# Patient Record
Sex: MALE | Race: WHITE | NOT HISPANIC OR LATINO | Employment: OTHER | URBAN - METROPOLITAN AREA
[De-identification: names, ages, dates, MRNs, and addresses within clinical notes are randomized per-mention and may not be internally consistent; named-entity substitution may affect disease eponyms.]

---

## 2017-11-10 ENCOUNTER — HOSPITAL ENCOUNTER (OUTPATIENT)
Dept: OTHER | Facility: HOSPITAL | Age: 67
Setting detail: SPECIMEN
Discharge: HOME | End: 2017-11-10
Attending: FAMILY MEDICINE | Admitting: FAMILY MEDICINE

## 2017-11-10 LAB
ALBUMIN SERPL-MCNC: 4.1 G/DL (ref 3.5–4.8)
ALBUMIN/GLOB SERPL: 1.3 {RATIO} (ref 1–1.7)
ALP SERPL-CCNC: 72 IU/L (ref 32–91)
ALT SERPL-CCNC: 29 IU/L (ref 17–63)
ANION GAP SERPL CALC-SCNC: 12.7 MMOL/L (ref 10–20)
AST SERPL-CCNC: 31 IU/L (ref 15–41)
BASOPHILS # BLD AUTO: 0.1 10*3/UL (ref 0–0.2)
BASOPHILS NFR BLD AUTO: 1 % (ref 0–2)
BILIRUB SERPL-MCNC: 1.1 MG/DL (ref 0.3–1.2)
BUN SERPL-MCNC: 22 MG/DL (ref 8–20)
BUN/CREAT SERPL: 22 (ref 6.2–20.3)
CALCIUM SERPL-MCNC: 9.5 MG/DL (ref 8.9–10.3)
CHLORIDE SERPL-SCNC: 105 MMOL/L (ref 101–111)
CHOLEST SERPL-MCNC: 188 MG/DL
CHOLEST/HDLC SERPL: 4.4 {RATIO}
CONV CO2: 25 MMOL/L (ref 22–32)
CONV LDL CHOLESTEROL DIRECT: 118 MG/DL (ref 0–100)
CONV TOTAL PROTEIN: 7.2 G/DL (ref 6.1–7.9)
CREAT UR-MCNC: 1 MG/DL (ref 0.7–1.2)
DIFFERENTIAL METHOD BLD: (no result)
EOSINOPHIL # BLD AUTO: 0.4 10*3/UL (ref 0–0.3)
EOSINOPHIL # BLD AUTO: 6 % (ref 0–3)
ERYTHROCYTE [DISTWIDTH] IN BLOOD BY AUTOMATED COUNT: 13.2 % (ref 11.5–14.5)
GLOBULIN UR ELPH-MCNC: 3.1 G/DL (ref 2.5–3.8)
GLUCOSE SERPL-MCNC: 92 MG/DL (ref 65–99)
HCT VFR BLD AUTO: 48.5 % (ref 40–54)
HDLC SERPL-MCNC: 42 MG/DL
HGB BLD-MCNC: 16.7 G/DL (ref 14–18)
LDLC/HDLC SERPL: 2.8 {RATIO}
LIPID INTERPRETATION: ABNORMAL
LYMPHOCYTES # BLD AUTO: 1.2 10*3/UL (ref 0.8–4.8)
LYMPHOCYTES NFR BLD AUTO: 19 % (ref 18–42)
MCH RBC QN AUTO: 30.6 PG (ref 26–32)
MCHC RBC AUTO-ENTMCNC: 34.5 G/DL (ref 32–36)
MCV RBC AUTO: 88.6 FL (ref 80–94)
MONOCYTES # BLD AUTO: 0.6 10*3/UL (ref 0.1–1.3)
MONOCYTES NFR BLD AUTO: 9 % (ref 2–11)
NEUTROPHILS # BLD AUTO: 4.2 10*3/UL (ref 2.3–8.6)
NEUTROPHILS NFR BLD AUTO: 65 % (ref 50–75)
NRBC BLD AUTO-RTO: 0 /100{WBCS}
NRBC/RBC NFR BLD MANUAL: 0 10*3/UL
PLATELET # BLD AUTO: 248 10*3/UL (ref 150–450)
PMV BLD AUTO: 7.2 FL (ref 7.4–10.4)
POTASSIUM SERPL-SCNC: 4.7 MMOL/L (ref 3.6–5.1)
RBC # BLD AUTO: 5.48 10*6/UL (ref 4.6–6)
SODIUM SERPL-SCNC: 138 MMOL/L (ref 136–144)
TRIGL SERPL-MCNC: 136 MG/DL
TSH SERPL-ACNC: 0.98 UIU/ML (ref 0.34–5.6)
VLDLC SERPL CALC-MCNC: 27.4 MG/DL
WBC # BLD AUTO: 6.6 10*3/UL (ref 4.5–11.5)

## 2017-12-21 ENCOUNTER — HOSPITAL ENCOUNTER (OUTPATIENT)
Dept: SLEEP MEDICINE | Facility: HOSPITAL | Age: 67
Discharge: HOME | End: 2017-12-21
Attending: PSYCHIATRY & NEUROLOGY | Admitting: PSYCHIATRY & NEUROLOGY

## 2017-12-26 ENCOUNTER — HOSPITAL ENCOUNTER (OUTPATIENT)
Dept: SLEEP MEDICINE | Facility: HOSPITAL | Age: 67
Discharge: HOME | End: 2017-12-26
Attending: PSYCHIATRY & NEUROLOGY | Admitting: PSYCHIATRY & NEUROLOGY

## 2018-04-04 ENCOUNTER — OFFICE (AMBULATORY)
Dept: URBAN - METROPOLITAN AREA PATHOLOGY 4 | Facility: PATHOLOGY | Age: 68
End: 2018-04-04
Payer: COMMERCIAL

## 2018-04-04 ENCOUNTER — HOSPITAL ENCOUNTER (OUTPATIENT)
Dept: OTHER | Facility: HOSPITAL | Age: 68
Setting detail: SPECIMEN
Discharge: HOME | End: 2018-04-04
Attending: INTERNAL MEDICINE | Admitting: INTERNAL MEDICINE

## 2018-04-04 ENCOUNTER — ON CAMPUS - OUTPATIENT (AMBULATORY)
Dept: URBAN - METROPOLITAN AREA HOSPITAL 2 | Facility: HOSPITAL | Age: 68
End: 2018-04-04
Payer: COMMERCIAL

## 2018-04-04 VITALS
RESPIRATION RATE: 17 BRPM | HEIGHT: 72 IN | OXYGEN SATURATION: 97 % | DIASTOLIC BLOOD PRESSURE: 96 MMHG | DIASTOLIC BLOOD PRESSURE: 80 MMHG | SYSTOLIC BLOOD PRESSURE: 137 MMHG | DIASTOLIC BLOOD PRESSURE: 84 MMHG | DIASTOLIC BLOOD PRESSURE: 90 MMHG | HEART RATE: 66 BPM | OXYGEN SATURATION: 94 % | SYSTOLIC BLOOD PRESSURE: 151 MMHG | SYSTOLIC BLOOD PRESSURE: 114 MMHG | RESPIRATION RATE: 16 BRPM | HEART RATE: 71 BPM | HEART RATE: 84 BPM | SYSTOLIC BLOOD PRESSURE: 124 MMHG | DIASTOLIC BLOOD PRESSURE: 61 MMHG | HEART RATE: 82 BPM | DIASTOLIC BLOOD PRESSURE: 93 MMHG | SYSTOLIC BLOOD PRESSURE: 115 MMHG | HEART RATE: 67 BPM | RESPIRATION RATE: 18 BRPM | SYSTOLIC BLOOD PRESSURE: 130 MMHG | OXYGEN SATURATION: 95 % | SYSTOLIC BLOOD PRESSURE: 121 MMHG | HEART RATE: 68 BPM | TEMPERATURE: 97.5 F | WEIGHT: 209 LBS | OXYGEN SATURATION: 96 % | SYSTOLIC BLOOD PRESSURE: 141 MMHG | DIASTOLIC BLOOD PRESSURE: 92 MMHG

## 2018-04-04 DIAGNOSIS — K62.1 RECTAL POLYP: ICD-10-CM

## 2018-04-04 DIAGNOSIS — Z86.010 PERSONAL HISTORY OF COLONIC POLYPS: ICD-10-CM

## 2018-04-04 DIAGNOSIS — K57.30 DIVERTICULOSIS OF LARGE INTESTINE WITHOUT PERFORATION OR ABS: ICD-10-CM

## 2018-04-04 LAB
GI HISTOLOGY: A. UNSPECIFIED: (no result)
GI HISTOLOGY: PDF REPORT: (no result)

## 2018-04-04 PROCEDURE — 45385 COLONOSCOPY W/LESION REMOVAL: CPT | Mod: PT

## 2018-04-04 PROCEDURE — 88305 TISSUE EXAM BY PATHOLOGIST: CPT | Mod: 26

## 2018-04-04 RX ADMIN — PROPOFOL: 10 INJECTION, EMULSION INTRAVENOUS at 08:55

## 2018-12-11 ENCOUNTER — OFFICE (AMBULATORY)
Dept: URBAN - METROPOLITAN AREA CLINIC 64 | Facility: CLINIC | Age: 68
End: 2018-12-11
Payer: COMMERCIAL

## 2018-12-11 VITALS
DIASTOLIC BLOOD PRESSURE: 102 MMHG | SYSTOLIC BLOOD PRESSURE: 143 MMHG | HEIGHT: 72 IN | WEIGHT: 220 LBS | HEART RATE: 80 BPM

## 2018-12-11 DIAGNOSIS — Z86.010 PERSONAL HISTORY OF COLONIC POLYPS: ICD-10-CM

## 2018-12-11 DIAGNOSIS — K57.32 DIVERTICULITIS OF LARGE INTESTINE WITHOUT PERFORATION OR ABS: ICD-10-CM

## 2018-12-11 PROCEDURE — 99213 OFFICE O/P EST LOW 20 MIN: CPT | Performed by: INTERNAL MEDICINE

## 2019-07-05 ENCOUNTER — OFFICE VISIT (OUTPATIENT)
Dept: FAMILY MEDICINE CLINIC | Facility: CLINIC | Age: 69
End: 2019-07-05

## 2019-07-05 VITALS
HEIGHT: 72 IN | WEIGHT: 214.2 LBS | SYSTOLIC BLOOD PRESSURE: 134 MMHG | BODY MASS INDEX: 29.01 KG/M2 | DIASTOLIC BLOOD PRESSURE: 92 MMHG | HEART RATE: 75 BPM | OXYGEN SATURATION: 95 %

## 2019-07-05 DIAGNOSIS — C61 PROSTATE CANCER (HCC): ICD-10-CM

## 2019-07-05 DIAGNOSIS — E55.9 VITAMIN D DEFICIENCY: ICD-10-CM

## 2019-07-05 DIAGNOSIS — E78.2 MIXED HYPERLIPIDEMIA: ICD-10-CM

## 2019-07-05 DIAGNOSIS — Z00.00 ENCOUNTER FOR GENERAL ADULT MEDICAL EXAMINATION WITHOUT ABNORMAL FINDINGS: Primary | ICD-10-CM

## 2019-07-05 DIAGNOSIS — M50.30 DDD (DEGENERATIVE DISC DISEASE), CERVICAL: ICD-10-CM

## 2019-07-05 DIAGNOSIS — I10 ESSENTIAL HYPERTENSION: ICD-10-CM

## 2019-07-05 DIAGNOSIS — L72.0 EPIDERMAL INCLUSION CYST: ICD-10-CM

## 2019-07-05 PROBLEM — J30.1 HAY FEVER: Status: ACTIVE | Noted: 2017-05-30

## 2019-07-05 PROBLEM — J20.8 ACUTE BACTERIAL BRONCHITIS: Status: ACTIVE | Noted: 2017-04-15

## 2019-07-05 PROBLEM — M19.90 OSTEOARTHRITIS: Status: ACTIVE | Noted: 2019-07-05

## 2019-07-05 PROBLEM — N52.9 ERECTILE DYSFUNCTION: Status: ACTIVE | Noted: 2019-07-05

## 2019-07-05 PROBLEM — B96.89 ACUTE BACTERIAL BRONCHITIS: Status: ACTIVE | Noted: 2017-04-15

## 2019-07-05 PROBLEM — K57.92 DIVERTICULITIS: Status: ACTIVE | Noted: 2018-09-06

## 2019-07-05 PROBLEM — E78.5 HYPERLIPIDEMIA: Status: ACTIVE | Noted: 2019-07-05

## 2019-07-05 PROBLEM — J01.00 SINUSITIS, ACUTE MAXILLARY: Status: ACTIVE | Noted: 2017-04-15

## 2019-07-05 PROBLEM — G47.33 OBSTRUCTIVE SLEEP APNEA SYNDROME: Status: ACTIVE | Noted: 2017-11-10

## 2019-07-05 PROCEDURE — 99214 OFFICE O/P EST MOD 30 MIN: CPT | Performed by: FAMILY MEDICINE

## 2019-07-05 PROCEDURE — 11402 EXC TR-EXT B9+MARG 1.1-2 CM: CPT | Performed by: FAMILY MEDICINE

## 2019-07-05 RX ORDER — CETIRIZINE HCL 1 MG/ML
SOLUTION, ORAL ORAL
COMMUNITY
Start: 2017-05-30 | End: 2020-04-29 | Stop reason: ALTCHOICE

## 2019-07-05 RX ORDER — ASCORBIC ACID 250 MG
TABLET ORAL
COMMUNITY
Start: 2016-06-28 | End: 2019-09-20

## 2019-07-05 RX ORDER — CHOLECALCIFEROL (VITAMIN D3) 125 MCG
CAPSULE ORAL
COMMUNITY
Start: 2015-07-17

## 2019-07-05 RX ORDER — ANTIOX #8/OM3/DHA/EPA/LUT/ZEAX 250-2.5 MG
CAPSULE ORAL
COMMUNITY
Start: 2016-06-28 | End: 2019-09-20

## 2019-07-05 NOTE — PROGRESS NOTES
"Subjective   Kingston Mancini is a 68 y.o. male.     Pt in for F/U HBP/HLD/borderline DM.  Doing well overall w no CV or Neuro Sx's.      /92   Pulse 75   Ht 182.9 cm (72.01\")   Wt 97.2 kg (214 lb 3.2 oz)   SpO2 95%   BMI 29.04 kg/m²      The following portions of the patient's history were reviewed and updated as appropriate: allergies, current medications, past family history, past medical history, past social history, past surgical history and problem list.    Review of Systems   Constitutional: Negative.    HENT: Negative.    Eyes: Negative.    Respiratory: Negative.    Cardiovascular: Negative.         Hx of HBP/HLD.   Gastrointestinal: Negative.         Hx of Diverticular Disease.   Endocrine: Negative.         HLD/Borderline DM.   Genitourinary: Negative.         Hx of Prostate Ca.  Sees Dr Infante.   Musculoskeletal: Positive for neck pain and neck stiffness.   Allergic/Immunologic: Positive for environmental allergies.   Neurological: Negative.    Hematological: Negative.    Psychiatric/Behavioral: Negative.        Objective   Physical Exam    Assessment/Plan   Kingston was seen today for mass.    Diagnoses and all orders for this visit:    Encounter for general adult medical examination without abnormal findings  -     Cancel: CBC & Differential  -     Cancel: Comprehensive Metabolic Panel  -     Cancel: Lipid Panel  -     Cancel: Hemoglobin A1c  -     Cancel: TSH  -     Cancel: Vitamin D 1,25 Dihydroxy  -     CBC & Differential; Future  -     Comprehensive Metabolic Panel; Future  -     Hemoglobin A1c; Future  -     Lipid Panel; Future  -     TSH; Future  -     Vitamin D 1,25 Dihydroxy; Future    Essential hypertension  -     Cancel: CBC & Differential  -     Cancel: Comprehensive Metabolic Panel  -     Cancel: Lipid Panel  -     Cancel: TSH  -     CBC & Differential; Future  -     Comprehensive Metabolic Panel; Future  -     Lipid Panel; Future  -     TSH; Future    Mixed hyperlipidemia  -     Cancel: " Comprehensive Metabolic Panel  -     Cancel: Lipid Panel  -     Comprehensive Metabolic Panel; Future  -     Lipid Panel; Future    DDD (degenerative disc disease), cervical    Prostate cancer (CMS/HCC)    Vitamin D deficiency  -     Cancel: Vitamin D 1,25 Dihydroxy  -     Vitamin D 1,25 Dihydroxy; Future

## 2019-07-05 NOTE — PATIENT INSTRUCTIONS
Future Labs as noted. F/U as indicated. meds reviewed w Adjust prn.  Healthy Heart Diet and Exercise. Cont  F/U w Dr. Infante. [facility] F/U prn or in 6 mo for HBP.    Procedure Note: 1.5 cm Inclusion Cyst L Elbow removed w 1 % xylocaine infiltration and Electrocautery and Currettage.  No Path indicated.  Wound Care Instructions given.

## 2019-08-15 ENCOUNTER — OFFICE VISIT (OUTPATIENT)
Dept: FAMILY MEDICINE CLINIC | Facility: CLINIC | Age: 69
End: 2019-08-15

## 2019-08-15 VITALS
HEART RATE: 79 BPM | OXYGEN SATURATION: 97 % | SYSTOLIC BLOOD PRESSURE: 121 MMHG | DIASTOLIC BLOOD PRESSURE: 90 MMHG | WEIGHT: 211.2 LBS | HEIGHT: 72 IN | BODY MASS INDEX: 28.61 KG/M2

## 2019-08-15 DIAGNOSIS — I10 ESSENTIAL HYPERTENSION: ICD-10-CM

## 2019-08-15 DIAGNOSIS — H02.60 XANTHOMA OF EYELID: ICD-10-CM

## 2019-08-15 DIAGNOSIS — Z48.89 ENCOUNTER FOR POST SURGICAL WOUND CHECK: ICD-10-CM

## 2019-08-15 DIAGNOSIS — E78.2 MIXED HYPERLIPIDEMIA: ICD-10-CM

## 2019-08-15 DIAGNOSIS — E55.9 VITAMIN D DEFICIENCY: ICD-10-CM

## 2019-08-15 DIAGNOSIS — Z00.00 ENCOUNTER FOR GENERAL ADULT MEDICAL EXAMINATION WITHOUT ABNORMAL FINDINGS: Primary | ICD-10-CM

## 2019-08-15 LAB
ALBUMIN SERPL-MCNC: 4.3 G/DL (ref 3.5–4.8)
ALBUMIN/GLOB SERPL: 1.5 G/DL (ref 1–1.7)
ALP SERPL-CCNC: 65 U/L (ref 32–91)
ALT SERPL W P-5'-P-CCNC: 25 U/L (ref 17–63)
ANION GAP SERPL CALCULATED.3IONS-SCNC: 15.4 MMOL/L (ref 5–15)
ARTICHOKE IGE QN: 120 MG/DL (ref 0–100)
AST SERPL-CCNC: 28 U/L (ref 15–41)
BILIRUB SERPL-MCNC: 1.2 MG/DL (ref 0.3–1.2)
BUN BLD-MCNC: 25 MG/DL (ref 8–20)
BUN/CREAT SERPL: 22.7 (ref 6.2–20.3)
CALCIUM SPEC-SCNC: 9.4 MG/DL (ref 8.9–10.3)
CHLORIDE SERPL-SCNC: 109 MMOL/L (ref 101–111)
CHOLEST SERPL-MCNC: 176 MG/DL
CO2 SERPL-SCNC: 19 MMOL/L (ref 22–32)
CREAT BLD-MCNC: 1.1 MG/DL (ref 0.7–1.2)
GFR SERPL CREATININE-BSD FRML MDRD: 67 ML/MIN/1.73
GLOBULIN UR ELPH-MCNC: 2.9 GM/DL (ref 2.5–3.8)
GLUCOSE BLD-MCNC: 92 MG/DL (ref 65–99)
HDLC SERPL QL: 4.51
HDLC SERPL-MCNC: 39 MG/DL
LDLC/HDLC SERPL: 2.64 {RATIO}
POTASSIUM BLD-SCNC: 4.4 MMOL/L (ref 3.6–5.1)
PROT SERPL-MCNC: 7.2 G/DL (ref 6.1–7.9)
SODIUM BLD-SCNC: 139 MMOL/L (ref 136–144)
TRIGL SERPL-MCNC: 170 MG/DL
TSH SERPL DL<=0.05 MIU/L-ACNC: 1.44 MIU/ML (ref 0.34–5.6)
VLDLC SERPL-MCNC: 34 MG/DL

## 2019-08-15 PROCEDURE — 80061 LIPID PANEL: CPT | Performed by: FAMILY MEDICINE

## 2019-08-15 PROCEDURE — 99213 OFFICE O/P EST LOW 20 MIN: CPT | Performed by: FAMILY MEDICINE

## 2019-08-15 PROCEDURE — 80053 COMPREHEN METABOLIC PANEL: CPT | Performed by: FAMILY MEDICINE

## 2019-08-15 PROCEDURE — 82652 VIT D 1 25-DIHYDROXY: CPT | Performed by: FAMILY MEDICINE

## 2019-08-15 PROCEDURE — 36415 COLL VENOUS BLD VENIPUNCTURE: CPT | Performed by: FAMILY MEDICINE

## 2019-08-15 PROCEDURE — 84443 ASSAY THYROID STIM HORMONE: CPT | Performed by: FAMILY MEDICINE

## 2019-08-15 RX ORDER — AZITHROMYCIN 250 MG/1
TABLET, FILM COATED ORAL
Qty: 6 TABLET | Refills: 1 | Status: CANCELLED | OUTPATIENT
Start: 2019-08-15

## 2019-08-15 NOTE — PROGRESS NOTES
"Subjective   Kingston Mancini is a 68 y.o. male.     Pt in for F/U lesion removal site L Elbow.  Also wants to have lesion on L eyelid checked. Appears to be growing.  F/U borderline HBP/HLD.  NO CV or Neuro Sx's.     /90   Pulse 79   Ht 182.9 cm (72.01\")   Wt 95.8 kg (211 lb 3.2 oz)   SpO2 97%   BMI 28.64 kg/m²     The following portions of the patient's history were reviewed and updated as appropriate: allergies, current medications, past family history, past medical history, past social history, past surgical history and problem list.    Review of Systems   Constitutional: Negative.    Respiratory: Negative.    Cardiovascular: Negative.         Borderline HBP.   Gastrointestinal: Negative.    Endocrine: Negative.    Genitourinary: Negative.    Skin:        Lesion L upper Lid.   Hematological: Negative.        Objective   Physical Exam   Constitutional: He is oriented to person, place, and time. He appears well-developed and well-nourished. No distress.   Eyes: Conjunctivae and EOM are normal. Pupils are equal, round, and reactive to light.   Neck: Normal range of motion. Neck supple. No thyromegaly present.   Cardiovascular: Normal rate, regular rhythm, normal heart sounds and intact distal pulses. Exam reveals no gallop.   No murmur heard.  Pulmonary/Chest: Effort normal and breath sounds normal. He has no wheezes. He has no rales.   Abdominal: Soft. Bowel sounds are normal. He exhibits no mass. There is no tenderness.   Musculoskeletal: He exhibits no edema.   Lymphadenopathy:     He has no cervical adenopathy.   Neurological: He is alert and oriented to person, place, and time. No cranial nerve deficit or sensory deficit. He exhibits normal muscle tone. Coordination normal.   Skin: Skin is warm and dry.   L upper Lid w Xanthoma.  L Elbow Site healed well.   Nursing note and vitals reviewed.      Assessment/Plan   Kingston was seen today for annual exam.    Diagnoses and all orders for this " visit:    Encounter for general adult medical examination without abnormal findings  -     Vitamin D 1,25 Dihydroxy  -     TSH  -     Lipid Panel  -     Cancel: Hemoglobin A1c  -     Comprehensive Metabolic Panel  -     Cancel: CBC & Differential  -     Cancel: CBC Auto Differential  -     Scan Slide; Future  -     Cancel: Scan Slide  -     CBC & Differential; Future  -     Hemoglobin A1c; Future    Encounter for post surgical wound check  -     CBC & Differential; Future    Xanthoma of eyelid  -     CBC & Differential; Future    Vitamin D deficiency  -     Vitamin D 1,25 Dihydroxy  -     CBC & Differential; Future    Essential hypertension  -     TSH  -     Lipid Panel  -     Comprehensive Metabolic Panel  -     Cancel: CBC & Differential  -     Cancel: CBC Auto Differential  -     Scan Slide; Future  -     Cancel: Scan Slide  -     CBC & Differential; Future    Mixed hyperlipidemia  -     Lipid Panel  -     Comprehensive Metabolic Panel  -     CBC & Differential; Future    Other orders  -     Cancel: azithromycin (ZITHROMAX) 250 MG tablet; Take 2 tablets the first day, then 1 tablet daily for 4 days.

## 2019-08-15 NOTE — PATIENT INSTRUCTIONS
Ck Fasting Labs as noted.  F/U as indicated. Aggressive Tx of Triglys if elevated. Opthamology referral as indicated. PCP F/U prn or in 6 mo.

## 2019-08-16 ENCOUNTER — TELEPHONE (OUTPATIENT)
Dept: FAMILY MEDICINE CLINIC | Facility: CLINIC | Age: 69
End: 2019-08-16

## 2019-08-16 RX ORDER — AZITHROMYCIN 250 MG/1
TABLET, FILM COATED ORAL
Qty: 6 TABLET | Refills: 1 | Status: SHIPPED | OUTPATIENT
Start: 2019-08-16 | End: 2019-09-20

## 2019-08-16 NOTE — TELEPHONE ENCOUNTER
PT WALKED IN SAID HE NEEDS TO REDO SOME PORTION OF BW THE PURPLE TOP PER VICK.  ALSO HE SAID YOU WERE GOING TO SEND IN ZPACK AND THAT IS NOT THERE YET EITHER. THANKS BM

## 2019-08-19 ENCOUNTER — CLINICAL SUPPORT (OUTPATIENT)
Dept: FAMILY MEDICINE CLINIC | Facility: CLINIC | Age: 69
End: 2019-08-19

## 2019-08-19 DIAGNOSIS — H02.60 XANTHOMA OF EYELID: ICD-10-CM

## 2019-08-19 DIAGNOSIS — E55.9 VITAMIN D DEFICIENCY: ICD-10-CM

## 2019-08-19 DIAGNOSIS — E78.2 MIXED HYPERLIPIDEMIA: ICD-10-CM

## 2019-08-19 DIAGNOSIS — I10 ESSENTIAL HYPERTENSION: ICD-10-CM

## 2019-08-19 DIAGNOSIS — R73.9 ELEVATED BLOOD SUGAR: Primary | ICD-10-CM

## 2019-08-19 DIAGNOSIS — Z00.00 ENCOUNTER FOR GENERAL ADULT MEDICAL EXAMINATION WITHOUT ABNORMAL FINDINGS: ICD-10-CM

## 2019-08-19 DIAGNOSIS — Z48.89 ENCOUNTER FOR POST SURGICAL WOUND CHECK: ICD-10-CM

## 2019-08-19 LAB
1,25(OH)2D3 SERPL-MCNC: 37.4 PG/ML (ref 19.9–79.3)
BASOPHILS # BLD AUTO: 0.1 10*3/MM3 (ref 0–0.2)
BASOPHILS NFR BLD AUTO: 1 % (ref 0–1.5)
DEPRECATED RDW RBC AUTO: 42 FL (ref 37–54)
EOSINOPHIL # BLD AUTO: 0.5 10*3/MM3 (ref 0–0.4)
EOSINOPHIL NFR BLD AUTO: 8.1 % (ref 0.3–6.2)
ERYTHROCYTE [DISTWIDTH] IN BLOOD BY AUTOMATED COUNT: 13 % (ref 12.3–15.4)
HCT VFR BLD AUTO: 50.6 % (ref 37.5–51)
HGB BLD-MCNC: 17.4 G/DL (ref 13–17.7)
LYMPHOCYTES # BLD AUTO: 1.4 10*3/MM3 (ref 0.7–3.1)
LYMPHOCYTES NFR BLD AUTO: 23.6 % (ref 19.6–45.3)
MCH RBC QN AUTO: 31.2 PG (ref 26.6–33)
MCHC RBC AUTO-ENTMCNC: 34.3 G/DL (ref 31.5–35.7)
MCV RBC AUTO: 90.9 FL (ref 79–97)
MONOCYTES # BLD AUTO: 0.6 10*3/MM3 (ref 0.1–0.9)
MONOCYTES NFR BLD AUTO: 9.7 % (ref 5–12)
NEUTROPHILS # BLD AUTO: 3.5 10*3/MM3 (ref 1.7–7)
NEUTROPHILS NFR BLD AUTO: 57.6 % (ref 42.7–76)
NRBC BLD AUTO-RTO: 0.7 /100 WBC (ref 0–0.2)
PLATELET # BLD AUTO: 247 10*3/MM3 (ref 140–450)
PMV BLD AUTO: 8 FL (ref 6–12)
RBC # BLD AUTO: 5.57 10*6/MM3 (ref 4.14–5.8)
WBC NRBC COR # BLD: 6.1 10*3/MM3 (ref 3.4–10.8)

## 2019-08-19 PROCEDURE — 36415 COLL VENOUS BLD VENIPUNCTURE: CPT | Performed by: FAMILY MEDICINE

## 2019-08-19 PROCEDURE — 85025 COMPLETE CBC W/AUTO DIFF WBC: CPT | Performed by: FAMILY MEDICINE

## 2019-08-19 PROCEDURE — 99213 OFFICE O/P EST LOW 20 MIN: CPT | Performed by: FAMILY MEDICINE

## 2019-08-19 PROCEDURE — 83036 HEMOGLOBIN GLYCOSYLATED A1C: CPT | Performed by: FAMILY MEDICINE

## 2019-08-19 PROCEDURE — 82306 VITAMIN D 25 HYDROXY: CPT | Performed by: FAMILY MEDICINE

## 2019-08-19 PROCEDURE — 36415 COLL VENOUS BLD VENIPUNCTURE: CPT

## 2019-08-20 LAB
25(OH)D3 SERPL-MCNC: 31.7 NG/ML (ref 30–100)
HBA1C MFR BLD: 4.8 % (ref 3.5–5.6)

## 2019-08-21 ENCOUNTER — OFFICE VISIT (OUTPATIENT)
Dept: FAMILY MEDICINE CLINIC | Facility: CLINIC | Age: 69
End: 2019-08-21

## 2019-08-21 VITALS
SYSTOLIC BLOOD PRESSURE: 123 MMHG | DIASTOLIC BLOOD PRESSURE: 82 MMHG | HEIGHT: 72 IN | OXYGEN SATURATION: 97 % | WEIGHT: 212.8 LBS | HEART RATE: 67 BPM | BODY MASS INDEX: 28.82 KG/M2

## 2019-08-21 DIAGNOSIS — D72.10 EOSINOPHILIA: Primary | ICD-10-CM

## 2019-08-21 PROCEDURE — 99211 OFF/OP EST MAY X REQ PHY/QHP: CPT | Performed by: FAMILY MEDICINE

## 2019-08-22 NOTE — PATIENT INSTRUCTIONS
Labs as noted. F/U as indicated. meds reviewed w no changes.  Healthy heart Diet and Exercise.  PCP F/U prn or in 6 mo.

## 2019-08-22 NOTE — PROGRESS NOTES
Subjective   Kingston Mancini is a 68 y.o. male.     Pt in for Stanley of Wound site L elbow and Ck lesion above L eye.  Also wants Labs done.    There were no vitals taken for this visit.     The following portions of the patient's history were reviewed and updated as appropriate: allergies, current medications, past family history, past medical history, past social history, past surgical history and problem list.    Review of Systems   Constitutional: Negative.    HENT:        L Lid Lesion.   Eyes: Negative.    Respiratory: Negative.    Cardiovascular:        HBP/HLD.   Gastrointestinal: Negative.    Endocrine: Negative.    Genitourinary: Negative.    Musculoskeletal: Negative.    Neurological: Negative.    Hematological: Negative.    Psychiatric/Behavioral: The patient is nervous/anxious.         Mild chronic anxiety.       Objective   Physical Exam   Constitutional: He is oriented to person, place, and time. He appears well-developed and well-nourished. No distress.   HENT:   Head: Normocephalic and atraumatic.   Eyes: Conjunctivae are normal. Pupils are equal, round, and reactive to light.   Xanthoma L upper Lid.   Cardiovascular: Normal rate, regular rhythm, normal heart sounds and intact distal pulses. Exam reveals no gallop.   No murmur heard.  Pulmonary/Chest: Effort normal and breath sounds normal. No respiratory distress. He has no wheezes. He has no rales.   Abdominal: Soft. Bowel sounds are normal. He exhibits no mass. There is no tenderness.   No Bruits.   Musculoskeletal: Normal range of motion. He exhibits no edema or tenderness.   Neurological: He is alert and oriented to person, place, and time. No cranial nerve deficit or sensory deficit. He exhibits normal muscle tone. Coordination normal.   Skin: Skin is warm and dry. No rash noted.   Nursing note and vitals reviewed.      Assessment/Plan   Diagnoses and all orders for this visit:    Elevated blood sugar  -     Hemoglobin A1c  -     Vitamin D 25  Hydroxy; Future  -     Vitamin D 25 Hydroxy    Encounter for general adult medical examination without abnormal findings  -     Cancel: Hemoglobin A1c  -     Cancel: CBC & Differential  -     CBC & Differential  -     CBC Auto Differential  -     Vitamin D 25 Hydroxy; Future  -     Vitamin D 25 Hydroxy    Encounter for post surgical wound check  -     Cancel: CBC & Differential  -     Vitamin D 25 Hydroxy; Future  -     Vitamin D 25 Hydroxy    Xanthoma of eyelid  -     Cancel: CBC & Differential  -     Vitamin D 25 Hydroxy; Future  -     Vitamin D 25 Hydroxy    Vitamin D deficiency  -     Cancel: CBC & Differential  -     Cancel: Vitamin D 1,25 Dihydroxy  -     Vitamin D 25 Hydroxy; Future  -     Vitamin D 25 Hydroxy    Essential hypertension  -     Cancel: CBC & Differential  -     Vitamin D 25 Hydroxy; Future  -     Vitamin D 25 Hydroxy    Mixed hyperlipidemia  -     Cancel: CBC & Differential  -     Vitamin D 25 Hydroxy; Future  -     Vitamin D 25 Hydroxy

## 2019-08-24 NOTE — PATIENT INSTRUCTIONS
Referral to [facility] for Eval and F/U of Eosinophilia noted on CBC.  F/U as indicated.

## 2019-08-24 NOTE — PROGRESS NOTES
Pt in for discussion of CBC Results.  Pt and Spouse concerned over Eosinophilia and elevated RBC.  Discussed referral to Hematology for further Eval.

## 2019-09-13 NOTE — PROGRESS NOTES
Hematology/Oncology Outpatient Consultation    Patient name: Kingston Mancini  : 1950  MRN: [id number]  Primary Care Physician: Papa Brooks Jr., MD  Referring Physician: Papa Brooks Jr.*  Reason For Consult:     Chief Complaint   Patient presents with   • Appointment     Eosinophila and erythrocytosis   ENRIQUE Regalado present during office visit.     History of Present Illness:  This patient is a 68-year-old  male with history of early stage prostate cancer diagnosed in  status post radical prostatectomy who on routine blood testing by his primary care provider Papa Brooks MD on 1990 labs was found to have WBC 6.1 with 58% neutrophils, 24% lymphocytes, 10% monocytes, 8% eosinophils and 0.7% nucleated red blood cells.  His hemoglobin was 17.4, hematocrit 50.6, and platelet count 247,000.  He had had a CMP performed on 8/15/2019 that had not revealed any significant abnormalities.  He was referred here for abnormal CBC and complained of more sneezing over a 2-year period.  He claims that he started wearing CPAP for a diagnosis of AURELIA in 2018 and he has been sneezing since then.  He is also feeling somewhat tired and short of air.  He denies any headaches chest pains extremity paresthesias.  He does complain of occasional blurring of vision.  He does not drink well water, travels frequently, denies skin rashes and takes herbs for joint aches and pains.  His father was diagnosed with amyloidosis and patient is concerned that he may have blood related disease.    Past Medical History:   Diagnosis Date   • Erectile dysfunction    • History of basal cell cancer     Abstraction from [facility] Past Medical Hx states Basal Cell on ear   • History of cardiac cath     Abstraction From [facility] Heart Cath   • History of degenerative disc disease     DDD   • Hyperlipidemia    • Hypertension    • AURELIA on CPAP 2017    AURELIA npsg Dec 2017 ahi 29 auto cpap 7-14 nasal  mask   • Osteoarthritis    • Prostate cancer (CMS/HCC)     Hussein score 6, followed by Dr. Infante, age 59       Past Surgical History:   Procedure Laterality Date   • HAND SURGERY      4/93, 10/93, 9/2007, 4/2013, 7/2013   • HERNIA REPAIR     • OTHER SURGICAL HISTORY      Abstraction from [facility] Duputryns Contracture   • PROSTATECTOMY           Current Outpatient Medications:   •  Black Pepper-Turmeric (TURMERIC COMPLEX/BLACK PEPPER PO), Take  by mouth., Disp: , Rfl:   •  Calcium-Vitamin D-Vitamin K (VIACTIV PO), Take  by mouth., Disp: , Rfl:   •  cetirizine (ZYRTEC ALLERGY) 10 MG tablet, ZYRTEC ALLERGY 10 MG TABS, Disp: , Rfl:   •  Cholecalciferol (VITAMIN D3) 2000 units tablet, VITAMIN D3 2000 UNIT TABS, Disp: , Rfl:   •  Probiotic Product (PROBIOTIC-10 PO), Take  by mouth., Disp: , Rfl:   •  Unable to find, 1 each 1 (One) Time. simran 7, Disp: , Rfl:     Allergies   Allergen Reactions   • Mushroom Unknown (See Comments)   • Penicillin G Anaphylaxis       Immunization History   Administered Date(s) Administered   • Flu Vaccine High Dose PF 65YR+ 11/10/2017   • Pneumococcal Conjugate 13-Valent (PCV13) 10/25/2016   • Td 10/25/2016   • Zoster, Unspecified 01/01/2011       Family History   Problem Relation Age of Onset   • Cancer Other         Breast Cancer   • Diabetes Other    • Heart disease Other    • Hypertension Other    • Diverticulosis Other    • Other Other         Amyloidosis   • Breast cancer Mother 70   • Other Father         Amyliodosis   • Asthma Sister    • Heart attack Maternal Grandmother    • Kidney cancer Maternal Grandfather    • Diabetes Paternal Grandfather        Cancer-related family history includes Breast cancer (age of onset: 70) in his mother; Cancer in his other; Kidney cancer in his maternal grandfather.    Social History     Tobacco Use   • Smoking status: Never Smoker   • Smokeless tobacco: Never Used   Substance Use Topics   • Alcohol use: Yes     Frequency: 4 or more times a week  "    Drinks per session: 1 or 2   • Drug use: No       ROS:    Review of Systems   Constitutional: Negative for chills and fever.   HENT: Negative for ear pain, mouth sores, nosebleeds and sore throat.    Eyes: Negative for photophobia and visual disturbance.   Respiratory: Negative for wheezing and stridor.    Cardiovascular: Negative for chest pain and palpitations.   Gastrointestinal: Negative for abdominal pain, diarrhea, nausea and vomiting.   Endocrine: Negative for cold intolerance and heat intolerance.   Genitourinary: Negative for dysuria and hematuria.   Musculoskeletal: Negative for joint swelling and neck stiffness.   Skin: Negative for color change and rash.   Neurological: Negative for seizures and syncope.        Reported some tingling in both hands in the evening.    Hematological: Negative for adenopathy.        No obvious bleeding   Psychiatric/Behavioral: Negative for agitation, confusion and hallucinations.       Objective:    Vitals:    09/20/19 0838   BP: 146/95   Pulse: 60   Resp: 18   Temp: 98 °F (36.7 °C)   SpO2: 95%   Weight: 96.2 kg (212 lb)   Height: 182.9 cm (72\")   PainSc: 0-No pain       ECOG  (0) Fully active, able to carry on all predisease performance without restriction    Physical Exam:    Physical Exam   Constitutional: He is oriented to person, place, and time. No distress.   HENT:   Head: Normocephalic and atraumatic.   Eye glasses present. Dental fillings. Low light reflex to the right and left ear.    Eyes: Conjunctivae and EOM are normal. Right eye exhibits no discharge. Left eye exhibits no discharge. No scleral icterus.   Neck: Normal range of motion. Neck supple. No thyromegaly present.   Cardiovascular: Normal rate, regular rhythm and normal heart sounds. Exam reveals no gallop and no friction rub.   Pulmonary/Chest: Effort normal. No stridor. No respiratory distress. He has no wheezes.   Abdominal: Soft. Bowel sounds are normal. He exhibits no mass. There is no " tenderness. There is no rebound and no guarding.   Musculoskeletal: Normal range of motion. He exhibits no tenderness.   Degenerative changes.    Lymphadenopathy:     He has no cervical adenopathy.   Neurological: He is alert and oriented to person, place, and time. He exhibits normal muscle tone.   Skin: Skin is warm. No rash noted. He is not diaphoretic. No erythema.   Psychiatric: He has a normal mood and affect. His behavior is normal.   Nursing note and vitals reviewed.      RECENT LABS  WBC   Date Value Ref Range Status   08/19/2019 6.10 3.40 - 10.80 10*3/mm3 Final     RBC   Date Value Ref Range Status   08/19/2019 5.57 4.14 - 5.80 10*6/mm3 Final     Hemoglobin   Date Value Ref Range Status   08/19/2019 17.4 13.0 - 17.7 g/dL Final     Hematocrit   Date Value Ref Range Status   08/19/2019 50.6 37.5 - 51.0 % Final     MCV   Date Value Ref Range Status   08/19/2019 90.9 79.0 - 97.0 fL Final     MCH   Date Value Ref Range Status   08/19/2019 31.2 26.6 - 33.0 pg Final     MCHC   Date Value Ref Range Status   08/19/2019 34.3 31.5 - 35.7 g/dL Final     RDW   Date Value Ref Range Status   08/19/2019 13.0 12.3 - 15.4 % Final     RDW-SD   Date Value Ref Range Status   08/19/2019 42.0 37.0 - 54.0 fl Final     MPV   Date Value Ref Range Status   08/19/2019 8.0 6.0 - 12.0 fL Final     Platelets   Date Value Ref Range Status   08/19/2019 247 140 - 450 10*3/mm3 Final     Neutrophil %   Date Value Ref Range Status   08/19/2019 57.6 42.7 - 76.0 % Final     Lymphocyte %   Date Value Ref Range Status   08/19/2019 23.6 19.6 - 45.3 % Final     Monocyte %   Date Value Ref Range Status   08/19/2019 9.7 5.0 - 12.0 % Final     Eosinophil %   Date Value Ref Range Status   08/19/2019 8.1 (H) 0.3 - 6.2 % Final     Basophil %   Date Value Ref Range Status   08/19/2019 1.0 0.0 - 1.5 % Final     Neutrophils, Absolute   Date Value Ref Range Status   08/19/2019 3.50 1.70 - 7.00 10*3/mm3 Final     Lymphocytes, Absolute   Date Value Ref Range  Status   08/19/2019 1.40 0.70 - 3.10 10*3/mm3 Final     Monocytes, Absolute   Date Value Ref Range Status   08/19/2019 0.60 0.10 - 0.90 10*3/mm3 Final     Eosinophils, Absolute   Date Value Ref Range Status   08/19/2019 0.50 (H) 0.00 - 0.40 10*3/mm3 Final     Basophils, Absolute   Date Value Ref Range Status   08/19/2019 0.10 0.00 - 0.20 10*3/mm3 Final     nRBC   Date Value Ref Range Status   08/19/2019 0.7 (H) 0.0 - 0.2 /100 WBC Final       Lab Results   Component Value Date    GLUCOSE 92 08/15/2019    BUN 25 (H) 08/15/2019    CREATININE 1.10 08/15/2019    EGFRIFNONA 67 08/15/2019    EGFRIFAFRI 78 10/26/2016    BCR 22.7 (H) 08/15/2019    K 4.4 08/15/2019    CO2 19.0 (L) 08/15/2019    CALCIUM 9.4 08/15/2019    ALBUMIN 4.30 08/15/2019    LABIL2 1.3 11/10/2017    AST 28 08/15/2019    ALT 25 08/15/2019         Assessment/Plan     Eosinophilia  - Ova & Parasite Examination - Stool, Per Rectum  - Angiotensin Converting Enzyme  - DEE DEE Comprehensive Panel  - XR Chest PA & Lateral  - RHEUMATOID FACTOR  - IgE  - CBC & Differential    Erythrocytosis  - XR Chest PA & Lateral  - Lactate Dehydrogenase  - Vitamin B12  - Erythropoietin  - GenPath Requisition ([place] Only)  - Cobalt  - Hemoglobin Electrophoresis  - CBC & Differential  In summary this is a patient with recently diagnosed erythrocytosis and eosinophilia.  I discussed the possible etiologies with him in detail and have I recommended proceeding with a work-up by checking LDH, vitamin B12, erythropoietin level, RBC enzymes, cobalt, hemoglobin electrophoresis and Feliciano 2 for erythrocytosis as well as eosinophilia work-up by checking a chest x-ray, stool parasites, serum IgE, ACE level, rheumatoid factor, and DEE DEE.  If these tests are not diagnostic and if the blood abnormalities persist, a bone marrow biopsy may be needed.          I have reviewed labs results, imaging, vitals, and medications with the patient today.  Will follow up in 1 month with CBC.    Patient verbalized  understanding and is in agreement of the above plan.      I have reviewed and validated the information above.   Rell Ring M.D., F.A.C.P.        Much of the above report is an electronic transcription/translation of the spoken language to printed text using Dragon Software. As such, the subtleties and finesse of the spoken language may permit erroneous, or at times, nonsensical words or phrases to be inadvertently transcribed; thus changes may be made at a later date to rectify these errors.

## 2019-09-20 ENCOUNTER — OFFICE VISIT (OUTPATIENT)
Dept: LAB | Facility: HOSPITAL | Age: 69
End: 2019-09-20

## 2019-09-20 ENCOUNTER — CONSULT (OUTPATIENT)
Dept: ONCOLOGY | Facility: CLINIC | Age: 69
End: 2019-09-20

## 2019-09-20 ENCOUNTER — TELEPHONE (OUTPATIENT)
Dept: ONCOLOGY | Facility: CLINIC | Age: 69
End: 2019-09-20

## 2019-09-20 ENCOUNTER — RESULTS ENCOUNTER (OUTPATIENT)
Dept: ONCOLOGY | Facility: CLINIC | Age: 69
End: 2019-09-20

## 2019-09-20 VITALS
OXYGEN SATURATION: 95 % | WEIGHT: 212 LBS | HEIGHT: 72 IN | DIASTOLIC BLOOD PRESSURE: 95 MMHG | SYSTOLIC BLOOD PRESSURE: 146 MMHG | TEMPERATURE: 98 F | HEART RATE: 60 BPM | RESPIRATION RATE: 18 BRPM | BODY MASS INDEX: 28.71 KG/M2

## 2019-09-20 DIAGNOSIS — D72.10 EOSINOPHILIA: Primary | ICD-10-CM

## 2019-09-20 DIAGNOSIS — D75.1 ERYTHROCYTOSIS: ICD-10-CM

## 2019-09-20 DIAGNOSIS — D72.10 EOSINOPHILIA: ICD-10-CM

## 2019-09-20 LAB
BASOPHILS # BLD AUTO: 0.04 10*3/MM3 (ref 0–0.2)
BASOPHILS NFR BLD AUTO: 0.7 % (ref 0–1.5)
DEPRECATED RDW RBC AUTO: 41.8 FL (ref 37–54)
EOSINOPHIL # BLD AUTO: 0.52 10*3/MM3 (ref 0–0.4)
EOSINOPHIL NFR BLD AUTO: 8.5 % (ref 0.3–6.2)
ERYTHROCYTE [DISTWIDTH] IN BLOOD BY AUTOMATED COUNT: 13 % (ref 12.3–15.4)
HCT VFR BLD AUTO: 47.7 % (ref 37.5–51)
HGB BLD-MCNC: 16.9 G/DL (ref 13–17.7)
LDH SERPL-CCNC: 113 U/L (ref 98–192)
LYMPHOCYTES # BLD AUTO: 1.6 10*3/MM3 (ref 0.7–3.1)
LYMPHOCYTES NFR BLD AUTO: 26.1 % (ref 19.6–45.3)
MCH RBC QN AUTO: 31.3 PG (ref 26.6–33)
MCHC RBC AUTO-ENTMCNC: 35.4 G/DL (ref 31.5–35.7)
MCV RBC AUTO: 88.3 FL (ref 79–97)
MONOCYTES # BLD AUTO: 0.65 10*3/MM3 (ref 0.1–0.9)
MONOCYTES NFR BLD AUTO: 10.6 % (ref 5–12)
NEUTROPHILS # BLD AUTO: 3.32 10*3/MM3 (ref 1.7–7)
NEUTROPHILS NFR BLD AUTO: 54.1 % (ref 42.7–76)
PLATELET # BLD AUTO: 225 10*3/MM3 (ref 140–450)
PMV BLD AUTO: 9.4 FL (ref 6–12)
RBC # BLD AUTO: 5.4 10*6/MM3 (ref 4.14–5.8)
VIT B12 BLD-MCNC: 394 PG/ML (ref 180–914)
WBC NRBC COR # BLD: 6.13 10*3/MM3 (ref 3.4–10.8)

## 2019-09-20 PROCEDURE — 83615 LACTATE (LD) (LDH) ENZYME: CPT | Performed by: INTERNAL MEDICINE

## 2019-09-20 PROCEDURE — 36415 COLL VENOUS BLD VENIPUNCTURE: CPT

## 2019-09-20 PROCEDURE — 85025 COMPLETE CBC W/AUTO DIFF WBC: CPT

## 2019-09-20 PROCEDURE — 99204 OFFICE O/P NEW MOD 45 MIN: CPT | Performed by: INTERNAL MEDICINE

## 2019-09-20 PROCEDURE — 82607 VITAMIN B-12: CPT | Performed by: INTERNAL MEDICINE

## 2019-09-20 PROCEDURE — 86431 RHEUMATOID FACTOR QUANT: CPT | Performed by: INTERNAL MEDICINE

## 2019-09-23 ENCOUNTER — HOSPITAL ENCOUNTER (OUTPATIENT)
Dept: GENERAL RADIOLOGY | Facility: HOSPITAL | Age: 69
Discharge: HOME | End: 2019-09-23
Admitting: INTERNAL MEDICINE

## 2019-09-23 DIAGNOSIS — D75.1 ERYTHROCYTOSIS: ICD-10-CM

## 2019-09-23 DIAGNOSIS — D72.10 EOSINOPHILIA: ICD-10-CM

## 2019-09-23 LAB
ACE SERPL-CCNC: 23 U/L (ref 14–82)
CENTROMERE B AB SER-ACNC: <0.2 AI (ref 0–0.9)
CHROMATIN AB SERPL-ACNC: 0.3 AI (ref 0–0.9)
CHROMATIN AB SERPL-ACNC: <20 IU/ML (ref 0–20)
COBALT SERPL-MCNC: NORMAL UG/L (ref 0–0.9)
DSDNA AB SER-ACNC: <1 IU/ML (ref 0–9)
ENA JO1 AB SER-ACNC: <0.2 AI (ref 0–0.9)
ENA RNP AB SER-ACNC: 0.3 AI (ref 0–0.9)
ENA SCL70 AB SER-ACNC: <0.2 AI (ref 0–0.9)
ENA SM AB SER-ACNC: <0.2 AI (ref 0–0.9)
ENA SS-A AB SER-ACNC: <0.2 AI (ref 0–0.9)
ENA SS-B AB SER-ACNC: <0.2 AI (ref 0–0.9)
ETHNIC BACKGROUND STATED: 5.8 MIU/ML (ref 2.6–18.5)
HGB A MFR BLD: 97.4 % (ref 96.4–98.8)
HGB A2 MFR BLD COLUMN CHROM: 2.6 % (ref 1.8–3.2)
HGB C MFR BLD: 0 %
HGB F MFR BLD: 0 % (ref 0–2)
HGB FRACT BLD-IMP: NORMAL
HGB S BLD QL SOLY: NEGATIVE
HGB S MFR BLD: 0 %
Lab: NORMAL

## 2019-09-23 PROCEDURE — 71046 X-RAY EXAM CHEST 2 VIEWS: CPT

## 2019-09-24 ENCOUNTER — LAB (OUTPATIENT)
Dept: LAB | Facility: HOSPITAL | Age: 69
End: 2019-09-24

## 2019-09-24 DIAGNOSIS — D72.10 EOSINOPHILIA: ICD-10-CM

## 2019-09-24 LAB — TOTAL IGE SMQN RAST: 8 IU/ML (ref 6–495)

## 2019-09-25 ENCOUNTER — TELEPHONE (OUTPATIENT)
Dept: ONCOLOGY | Facility: CLINIC | Age: 69
End: 2019-09-25

## 2019-09-25 LAB — O+P SPEC MICRO: NORMAL

## 2019-09-25 NOTE — TELEPHONE ENCOUNTER
Mr. Mancini left message asking to change his appt date to earlier date due to being unavailable on 22nd.  Called patient and scheduled for 10/16.

## 2019-09-26 ENCOUNTER — APPOINTMENT (OUTPATIENT)
Dept: LAB | Facility: HOSPITAL | Age: 69
End: 2019-09-26

## 2019-10-15 NOTE — PROGRESS NOTES
Hematology/Oncology Outpatient Follow Up    PATIENT NAME:Kingston Mancini  :1950  MRN: [id number]  PRIMARY CARE PHYSICIAN: Papa Brooks Jr., MD  REFERRING PHYSICIAN: Papa Brooks Jr.*    Chief Complaint   Patient presents with   • Follow-up     eosinophilia and erythrocytosis        HISTORY OF PRESENT ILLNESS:   1.   · This patient is a 68-year-old  male with history of early stage prostate cancer diagnosed in  status post radical prostatectomy who on routine blood testing by his primary care provider, Papa Brooks MD on 2019 labs was found to have WBC 6.1 with 58% neutrophils, 24% lymphocytes, 10% monocytes, 8% eosinophils and 0.7% nucleated red blood cells.  His hemoglobin was 17.4, hematocrit 50.6, and platelet count 247,000.  He had a CMP performed on 8/15/2019 that had not revealed any significant abnormalities.  He was referred here for abnormal CBC and complained of increased sneezing over a 2-year period.  He claimed that he started wearing CPAP for a diagnosis of AURELIA in 2018 and he had been sneezing since then.  He was also feeling somewhat tired and short of air.  He denied any headaches, chest pains or extremity paresthesias.  He did complain of occasional blurring of vision.  He did not drink well water or traveled frequently, denied skin rashes and took herbs for joint aches and pains.  His father was diagnosed with amyloidosis and patient was concerned that he may have blood related disease.  · 19 -patient seen in initial consultation at the cancer center for erythrocytosis and eosinophilia on referral by Dr. Brooks.  WBC 6.1 with 54% neutrophils, 26% lymphocytes, 11% monocytes, 9% eosinophils, hemoglobin 16.9, MCV 88.3, hematocrit 47.7, platelet count 225,000.  ACE 23 (14-82). DEE DEE was negative. Rheumatoid factor quantitative <20 (0-20).   (). Vitamin B12 394 (180-914). Erythropoietin 5.8 (2.6-18.5). Cobalt showed none detected (0-0.9).  Hemoglobin electrophoresis was normal. IgE 8 (6-495). Stool ova and parasites was incomplete due to a collection error. JAK2 was normal. RBC enzymes normal.  O2 sat 95% on room air.  · 9/23/19 - Xray chest showed no active disease.   · 10/16/2019 WBC 7.9 with 5.5% eosinophils and hemoglobin 16.9.  Decision made to repeat CBC in few months as patient claimed that his symptoms of sneezing went away after he used a  in his CPAP machine.    Past Medical History:   Diagnosis Date   • Erectile dysfunction    • History of basal cell cancer 2002    Abstraction from [facility] Past Medical Hx states Basal Cell on ear   • History of cardiac cath 2006    Abstraction From [facility] Heart Cath   • History of degenerative disc disease     DDD   • Hyperlipidemia    • Hypertension    • AURELIA on CPAP 12/2017    AURELIA npsg Dec 2017 ahi 29 auto cpap 7-14 nasal mask   • Osteoarthritis    • Prostate cancer (CMS/[facility])     [place] score 6, followed by Dr. Infante, age 59       Past Surgical History:   Procedure Laterality Date   • HAND SURGERY      4/93, 10/93, 9/2007, 4/2013, 7/2013   • HERNIA REPAIR     • OTHER SURGICAL HISTORY      Abstraction from [facility] Duputryns Contracture   • PROSTATECTOMY           Current Outpatient Medications:   •  Black Pepper-Turmeric (TURMERIC COMPLEX/BLACK PEPPER PO), Take  by mouth., Disp: , Rfl:   •  Calcium-Vitamin D-Vitamin K (VIACTIV PO), Take  by mouth., Disp: , Rfl:   •  cetirizine (ZYRTEC ALLERGY) 10 MG tablet, ZYRTEC ALLERGY 10 MG TABS, Disp: , Rfl:   •  Cholecalciferol (VITAMIN D3) 2000 units tablet, VITAMIN D3 2000 UNIT TABS, Disp: , Rfl:   •  Probiotic Product (PROBIOTIC-10 PO), Take  by mouth., Disp: , Rfl:   •  Unable to find, 1 each 1 (One) Time. simran 7, Disp: , Rfl:     Allergies   Allergen Reactions   • Mushroom Unknown (See Comments)   • Penicillin G Anaphylaxis       Family History   Problem Relation Age of Onset   • Cancer Other         Breast Cancer   • Diabetes Other    • Heart  disease Other    • Hypertension Other    • Diverticulosis Other    • Other Other         Amyloidosis   • Breast cancer Mother 70   • Other Father         Amyliodosis   • Asthma Sister    • Heart attack Maternal Grandmother    • Kidney cancer Maternal Grandfather    • Diabetes Paternal Grandfather        Cancer-related family history includes Breast cancer (age of onset: 70) in his mother; Cancer in an other family member; Kidney cancer in his maternal grandfather.    Social History     Tobacco Use   • Smoking status: Never Smoker   • Smokeless tobacco: Never Used   Substance Use Topics   • Alcohol use: Yes     Frequency: 4 or more times a week     Drinks per session: 1 or 2   • Drug use: No       I have reviewed the history of present illness, past medical history, family history, social history, lab results, all notes and other records since the patient was last seen on 9/20/2019.    SUBJECTIVE: Patient is here to follow up on eosinophilia and erythrocytosis. Reports that he got a new  for is CPAP and all of his coughing and sneezing and congestions has resolved. He leaves for [place] in January.       ENRIQUE Miller present during office visit.     REVIEW OF SYSTEMS:  Review of Systems   Constitutional: Negative for chills and fever.   HENT: Negative for ear pain, mouth sores, nosebleeds and sore throat.    Eyes: Negative for photophobia and visual disturbance.   Respiratory: Negative for wheezing and stridor.    Cardiovascular: Negative for chest pain and palpitations.   Gastrointestinal: Negative for abdominal pain, diarrhea, nausea and vomiting.   Endocrine: Negative for cold intolerance and heat intolerance.   Genitourinary: Negative for dysuria and hematuria.   Musculoskeletal: Negative for joint swelling and neck stiffness.   Skin: Negative for color change and rash.   Neurological: Negative for seizures and syncope.   Hematological: Negative for adenopathy.        No obvious bleeding  "  Psychiatric/Behavioral: Negative for agitation, confusion and hallucinations.       OBJECTIVE:    Vitals:    10/16/19 1056   BP: 149/92   Pulse: 69   Resp: 16   Temp: 97.6 °F (36.4 °C)   Weight: 97.4 kg (214 lb 12.8 oz)   Height: 182.9 cm (72\")   PainSc: 0-No pain       ECOG  (0) Fully active, able to carry on all predisease performance without restriction    Physical Exam   Constitutional: He is oriented to person, place, and time. No distress.   Female accompanied patient today.    HENT:   Head: Normocephalic and atraumatic.   Dental fillings   Eyes: Conjunctivae and EOM are normal. Right eye exhibits no discharge. Left eye exhibits no discharge. No scleral icterus.   Neck: Normal range of motion. Neck supple. No thyromegaly present.   Cardiovascular: Normal rate, regular rhythm and normal heart sounds. Exam reveals no gallop and no friction rub.   Pulmonary/Chest: Effort normal. No stridor. No respiratory distress. He has no wheezes.   Abdominal: Soft. Bowel sounds are normal. He exhibits no mass. There is no tenderness. There is no rebound and no guarding.   Musculoskeletal: Normal range of motion. He exhibits no tenderness.   Lymphadenopathy:     He has no cervical adenopathy.   Neurological: He is alert and oriented to person, place, and time. He exhibits normal muscle tone.   Skin: Skin is warm. No rash noted. He is not diaphoretic. No erythema.   Psychiatric: He has a normal mood and affect. His behavior is normal.   Nursing note and vitals reviewed.      RECENT LABS  WBC   Date Value Ref Range Status   10/16/2019 7.94 3.40 - 10.80 10*3/mm3 Final     RBC   Date Value Ref Range Status   10/16/2019 5.38 4.14 - 5.80 10*6/mm3 Final     Hemoglobin   Date Value Ref Range Status   10/16/2019 16.9 13.0 - 17.7 g/dL Final     Hematocrit   Date Value Ref Range Status   10/16/2019 48.4 37.5 - 51.0 % Final     MCV   Date Value Ref Range Status   10/16/2019 90.0 79.0 - 97.0 fL Final     MCH   Date Value Ref Range Status "   10/16/2019 31.4 26.6 - 33.0 pg Final     MCHC   Date Value Ref Range Status   10/16/2019 34.9 31.5 - 35.7 g/dL Final     RDW   Date Value Ref Range Status   10/16/2019 13.0 12.3 - 15.4 % Final     RDW-SD   Date Value Ref Range Status   10/16/2019 42.0 37.0 - 54.0 fl Final     MPV   Date Value Ref Range Status   10/16/2019 9.0 6.0 - 12.0 fL Final     Platelets   Date Value Ref Range Status   10/16/2019 242 140 - 450 10*3/mm3 Final     Neutrophil %   Date Value Ref Range Status   10/16/2019 63.6 42.7 - 76.0 % Final     Lymphocyte %   Date Value Ref Range Status   10/16/2019 19.5 (L) 19.6 - 45.3 % Final     Monocyte %   Date Value Ref Range Status   10/16/2019 10.6 5.0 - 12.0 % Final     Eosinophil %   Date Value Ref Range Status   10/16/2019 5.5 0.3 - 6.2 % Final     Basophil %   Date Value Ref Range Status   10/16/2019 0.8 0.0 - 1.5 % Final     Neutrophils, Absolute   Date Value Ref Range Status   10/16/2019 5.05 1.70 - 7.00 10*3/mm3 Final     Lymphocytes, Absolute   Date Value Ref Range Status   10/16/2019 1.55 0.70 - 3.10 10*3/mm3 Final     Monocytes, Absolute   Date Value Ref Range Status   10/16/2019 0.84 0.10 - 0.90 10*3/mm3 Final     Eosinophils, Absolute   Date Value Ref Range Status   10/16/2019 0.44 (H) 0.00 - 0.40 10*3/mm3 Final     Basophils, Absolute   Date Value Ref Range Status   10/16/2019 0.06 0.00 - 0.20 10*3/mm3 Final     nRBC   Date Value Ref Range Status   08/19/2019 0.7 (H) 0.0 - 0.2 /100 WBC Final       Lab Results   Component Value Date    GLUCOSE 92 08/15/2019    BUN 25 (H) 08/15/2019    CREATININE 1.10 08/15/2019    EGFRIFNONA 67 08/15/2019    EGFRIFAFRI 78 10/26/2016    BCR 22.7 (H) 08/15/2019    K 4.4 08/15/2019    CO2 19.0 (L) 08/15/2019    CALCIUM 9.4 08/15/2019    ALBUMIN 4.30 08/15/2019    LABIL2 1.3 11/10/2017    AST 28 08/15/2019    ALT 25 08/15/2019         Assessment/Plan     Eosinophilia  - CBC & Differential  - CBC Auto Differential    Erythrocytosis  - CBC & Differential  - CBC  Auto Differential    Vitamin B12 deficiency  - Methylmalonic Acid, Serum      ASSESSMENT:  Patient claims to be feeling better with sneezing resolved after he used a  in his CPAP machine.  Discussed his work-up results at today's CBC reveals decrease in eosinophils, and have hence recommended for him not to have a stool specimen rechecked.  His hemoglobin has been running at 16.9 which is improved since his referral here.  Feliciano 2 was negative.  Have advised repeating CBC in 3 months but as he goes to [place] in the koo will probably have it repeated in 2 months.      PLAN:  Will check an MMA next visit.              I have reviewed labs results, imaging, vitals, and medications with the patient today. Will follow up in two months with a CBC.     Patient verbalized understanding and is in agreement of the above plan.    I have reviewed and validated the information above.   Rell Ring M.D., F.A.C.P.    Much of the above report is an electronic transcription/translation of the spoken language to printed text using Dragon Software. As such, the subtleties and finesse of the spoken language may permit erroneous, or at times, nonsensical words or phrases to be inadvertently transcribed; thus changes may be made at a later date to rectify these errors.

## 2019-10-16 ENCOUNTER — APPOINTMENT (OUTPATIENT)
Dept: LAB | Facility: HOSPITAL | Age: 69
End: 2019-10-16

## 2019-10-16 ENCOUNTER — OFFICE VISIT (OUTPATIENT)
Dept: ONCOLOGY | Facility: CLINIC | Age: 69
End: 2019-10-16

## 2019-10-16 VITALS
RESPIRATION RATE: 16 BRPM | WEIGHT: 214.8 LBS | BODY MASS INDEX: 29.09 KG/M2 | DIASTOLIC BLOOD PRESSURE: 92 MMHG | TEMPERATURE: 97.6 F | HEART RATE: 69 BPM | HEIGHT: 72 IN | SYSTOLIC BLOOD PRESSURE: 149 MMHG

## 2019-10-16 DIAGNOSIS — E53.8 VITAMIN B12 DEFICIENCY: ICD-10-CM

## 2019-10-16 DIAGNOSIS — D75.1 ERYTHROCYTOSIS: ICD-10-CM

## 2019-10-16 DIAGNOSIS — D72.10 EOSINOPHILIA: Primary | ICD-10-CM

## 2019-10-16 LAB
BASOPHILS # BLD AUTO: 0.06 10*3/MM3 (ref 0–0.2)
BASOPHILS NFR BLD AUTO: 0.8 % (ref 0–1.5)
DEPRECATED RDW RBC AUTO: 42 FL (ref 37–54)
EOSINOPHIL # BLD AUTO: 0.44 10*3/MM3 (ref 0–0.4)
EOSINOPHIL NFR BLD AUTO: 5.5 % (ref 0.3–6.2)
ERYTHROCYTE [DISTWIDTH] IN BLOOD BY AUTOMATED COUNT: 13 % (ref 12.3–15.4)
HCT VFR BLD AUTO: 48.4 % (ref 37.5–51)
HGB BLD-MCNC: 16.9 G/DL (ref 13–17.7)
LYMPHOCYTES # BLD AUTO: 1.55 10*3/MM3 (ref 0.7–3.1)
LYMPHOCYTES NFR BLD AUTO: 19.5 % (ref 19.6–45.3)
MCH RBC QN AUTO: 31.4 PG (ref 26.6–33)
MCHC RBC AUTO-ENTMCNC: 34.9 G/DL (ref 31.5–35.7)
MCV RBC AUTO: 90 FL (ref 79–97)
MONOCYTES # BLD AUTO: 0.84 10*3/MM3 (ref 0.1–0.9)
MONOCYTES NFR BLD AUTO: 10.6 % (ref 5–12)
NEUTROPHILS # BLD AUTO: 5.05 10*3/MM3 (ref 1.7–7)
NEUTROPHILS NFR BLD AUTO: 63.6 % (ref 42.7–76)
PLATELET # BLD AUTO: 242 10*3/MM3 (ref 140–450)
PMV BLD AUTO: 9 FL (ref 6–12)
RBC # BLD AUTO: 5.38 10*6/MM3 (ref 4.14–5.8)
WBC NRBC COR # BLD: 7.94 10*3/MM3 (ref 3.4–10.8)

## 2019-10-16 PROCEDURE — 99214 OFFICE O/P EST MOD 30 MIN: CPT | Performed by: INTERNAL MEDICINE

## 2019-10-16 PROCEDURE — 85025 COMPLETE CBC W/AUTO DIFF WBC: CPT | Performed by: INTERNAL MEDICINE

## 2019-10-16 PROCEDURE — 36415 COLL VENOUS BLD VENIPUNCTURE: CPT | Performed by: INTERNAL MEDICINE

## 2019-11-25 ENCOUNTER — TELEPHONE (OUTPATIENT)
Dept: ONCOLOGY | Facility: CLINIC | Age: 69
End: 2019-11-25

## 2019-11-25 NOTE — TELEPHONE ENCOUNTER
Mr. Mancini left message to ensure his appt on 12/18 had been canceled w/ Dr. Ring.  Per chart, appt canceled.  No call back requested.

## 2019-12-17 ENCOUNTER — APPOINTMENT (OUTPATIENT)
Dept: LAB | Facility: HOSPITAL | Age: 69
End: 2019-12-17

## 2019-12-17 ENCOUNTER — CONSULT (OUTPATIENT)
Dept: ONCOLOGY | Facility: CLINIC | Age: 69
End: 2019-12-17

## 2019-12-17 VITALS
RESPIRATION RATE: 16 BRPM | SYSTOLIC BLOOD PRESSURE: 144 MMHG | HEIGHT: 71 IN | OXYGEN SATURATION: 91 % | BODY MASS INDEX: 31.15 KG/M2 | HEART RATE: 81 BPM | TEMPERATURE: 98 F | WEIGHT: 222.5 LBS | DIASTOLIC BLOOD PRESSURE: 93 MMHG

## 2019-12-17 DIAGNOSIS — G47.33 OBSTRUCTIVE SLEEP APNEA SYNDROME: ICD-10-CM

## 2019-12-17 DIAGNOSIS — D75.1 POLYCYTHEMIA: ICD-10-CM

## 2019-12-17 DIAGNOSIS — D72.10 EOSINOPHILIA: Primary | ICD-10-CM

## 2019-12-17 LAB
BASOPHILS # BLD AUTO: 0.08 10*3/MM3 (ref 0–0.2)
BASOPHILS NFR BLD AUTO: 0.9 % (ref 0–1.5)
DEPRECATED RDW RBC AUTO: 40.2 FL (ref 37–54)
EOSINOPHIL # BLD AUTO: 0.64 10*3/MM3 (ref 0–0.4)
EOSINOPHIL NFR BLD AUTO: 7.5 % (ref 0.3–6.2)
ERYTHROCYTE [DISTWIDTH] IN BLOOD BY AUTOMATED COUNT: 12.1 % (ref 12.3–15.4)
HCT VFR BLD AUTO: 49.4 % (ref 37.5–51)
HGB BLD-MCNC: 17 G/DL (ref 13–17.7)
IMM GRANULOCYTES # BLD AUTO: 0.06 10*3/MM3 (ref 0–0.05)
IMM GRANULOCYTES NFR BLD AUTO: 0.7 % (ref 0–0.5)
LYMPHOCYTES # BLD AUTO: 1.9 10*3/MM3 (ref 0.7–3.1)
LYMPHOCYTES NFR BLD AUTO: 22.1 % (ref 19.6–45.3)
MCH RBC QN AUTO: 31 PG (ref 26.6–33)
MCHC RBC AUTO-ENTMCNC: 34.4 G/DL (ref 31.5–35.7)
MCV RBC AUTO: 90.1 FL (ref 79–97)
MONOCYTES # BLD AUTO: 0.81 10*3/MM3 (ref 0.1–0.9)
MONOCYTES NFR BLD AUTO: 9.4 % (ref 5–12)
NEUTROPHILS # BLD AUTO: 5.1 10*3/MM3 (ref 1.7–7)
NEUTROPHILS NFR BLD AUTO: 59.4 % (ref 42.7–76)
NRBC BLD AUTO-RTO: 0 /100 WBC (ref 0–0.2)
PLATELET # BLD AUTO: 199 10*3/MM3 (ref 140–450)
PMV BLD AUTO: 9 FL (ref 6–12)
RBC # BLD AUTO: 5.48 10*6/MM3 (ref 4.14–5.8)
WBC NRBC COR # BLD: 8.59 10*3/MM3 (ref 3.4–10.8)

## 2019-12-17 PROCEDURE — 36416 COLLJ CAPILLARY BLOOD SPEC: CPT

## 2019-12-17 PROCEDURE — 99205 OFFICE O/P NEW HI 60 MIN: CPT | Performed by: INTERNAL MEDICINE

## 2019-12-17 PROCEDURE — 85025 COMPLETE CBC W/AUTO DIFF WBC: CPT

## 2019-12-17 NOTE — PROGRESS NOTES
Subjective     REASON FOR CONSULTATION:  Provide an opinion on any further workup or treatment on:    Eosinophilia  Erythrocytosis                       REQUESTING PHYSICIAN: Rell Ring MD    RECORDS OBTAINED: Records of the patients history including those obtained from the referring provider were reviewed and summarized in detail.    HISTORY OF PRESENT ILLNESS:    Kingston Mancini is a 69 y.o. patient who was referred for evaluation of eosinophilia and erythrocytosis.  On review of labs dating back to 11/10/2017, eosinophils were 400.  Hemoglobin was 16.7 and hematocrit was 48.5%.  He was diagnosed with sleep apnea syndrome a year and a half ago.  He was seen by sleep specialist and started on CPAP mask.  However, he did not tolerate it.  He was very claustrophobic.  Therefore, it was changed to nasal CPAP.  He tolerated that better but he has problem with feeling some degree of blockage in the nose as he is normally a mouth breather.    Patient was noted to have increase in the RBCs and eosinophils in August 2019.  He was referred to Dr. Ring he had.  Work-up that consisted of blood work-up and chest x-ray.  He had testing for underlying mutations seen in myeloproliferative disorders and the tests were negative.  He was suspected of having possible allergy to pathogen in the CPAP tubing.  He started cleaning the tubing on a regular basis and felt better afterwards.    Patient denies chest pain shortness of breath.  He reports improvement in the fatigue and in the daytime sleepiness since he started using the CPAP machine.    Patient does not take any iron supplements.  However, he is taking vitamin C tablets as well as of arthritis supplement that contains vitamin C.       REVIEW OF SYSTEMS:  Review of Systems   Constitutional: Negative for chills, fever and unexpected weight change.   HENT: Negative for mouth sores, nosebleeds, sore throat and voice change.    Eyes: Negative for visual disturbance.    Respiratory: Positive for choking and shortness of breath. Negative for cough.    Cardiovascular: Negative for chest pain and leg swelling.   Gastrointestinal: Negative for abdominal pain, blood in stool, constipation, diarrhea, nausea and vomiting.   Endocrine: Positive for cold intolerance.   Genitourinary: Negative for dysuria, frequency and hematuria.   Musculoskeletal: Positive for back pain. Negative for arthralgias and joint swelling.   Skin: Negative for rash.   Neurological: Positive for numbness. Negative for dizziness and headaches.   Hematological: Negative for adenopathy. Does not bruise/bleed easily.   Psychiatric/Behavioral: Negative for dysphoric mood. The patient is not nervous/anxious.        Past Medical History:   Diagnosis Date   • Erectile dysfunction    • History of basal cell cancer 2002    Abstraction from [facility] Past Medical Hx states Basal Cell on ear   • History of cardiac cath 2006    Abstraction From [facility] Heart Cath   • History of degenerative disc disease     DDD   • Hyperlipidemia    • Hypertension    • AURELIA on CPAP 12/2017    AURELIA npsg Dec 2017 ahi 29 auto cpap 7-14 nasal mask   • Osteoarthritis    • Prostate cancer (CMS/[facility])     [place] score 6, followed by Dr. 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    MEDICATIONS:    Current Outpatient Medications:   •  Ascorbic Acid (VITAMIN C PO), Take  by mouth., Disp: , Rfl:   •  Black Pepper-Turmeric (TURMERIC COMPLEX/BLACK PEPPER PO), Take  by mouth., Disp: , Rfl:   •  cetirizine (ZYRTEC ALLERGY) 10 MG tablet, ZYRTEC ALLERGY 10 MG TABS, Disp: , Rfl:   •  Cholecalciferol (VITAMIN D3) 2000 units tablet, VITAMIN D3 2000 UNIT TABS, Disp: , Rfl:   •  Probiotic Product (PROBIOTIC-10 PO), Take  by mouth., Disp: , Rfl:   •  Unable to find, 1 each 1 (One[place] [facility] [place], Disp: , Rfl:      ALLERGIES:  Allergies   Allergen Reactions   • Mushroom Unknown - High Severity   • Penicillin G Anaphylaxis        Objective   VITAL SIGNS:  Vitals:    12/17/19 1306   BP: 144/93   Pulse: 81   Resp: 16   Temp: 98 °F (36.7 °C)   TempSrc: Oral   SpO2: 91%   Weight: 101 kg (222 lb 8 oz)   Height: 181 cm (71.26\")  Comment: new ht   PainSc: 0-No pain       Wt Readings from Last 3 Encounters:   12/17/19 101 kg (222 lb 8 oz)   10/16/19 97.4 kg (214 lb 12.8 oz)   09/20/19 96.2 kg (212 lb)       PHYSICAL EXAMINATION  GENERAL:  The patient appears in good general condition, not in acute distress.  SKIN: Warm and dry. No skin rashes. No ecchymosis.  HEAD:  Normocephalic.  EYES:  No Jaundice. No Pallor. Pupils equal. EOMI.  NECK:  Supple with Good ROM. No Thyromegaly. No Masses.  LYMPHATICS:  No cervical or supraclavicular or axillary lymphadenopathy.  CHEST: Normal respiratory effort. Lungs clear to auscultation.   CARDIAC:  Normal S1 & S2. No murmur. No edema.  ABDOMEN:  Soft. No tenderness. No Hepatomegaly. No Splenomegaly. No masses.  EXTREMITIES:  No clubbing. No deforming arthritis in the " hands.  NEUROLOGICAL:  No Focal neurological deficits.       RESULT REVIEW:   Results from last 7 days   Lab Units 12/17/19  1211   WBC 10*3/mm3 8.59   NEUTROS ABS 10*3/mm3 5.10   HEMOGLOBIN g/dL 17.0   HEMATOCRIT % 49.4   PLATELETS 10*3/mm3 199     I reviewed the peripheral blood smear.  There is increase in the eosinophils but they were normal in morphology.  WBCs were normal in morphology.  No premature WBCs or blasts were identified.  RBCs were normal morphology.  Platelets were normal in size and number.    Lab Results   Component Value Date    FOLATE 18.3 10/26/2016     Lab Results   Component Value Date    CLGPSBXT15 394 09/20/2019    TUUQVBCH71 690 10/26/2016     Component      Latest Ref Rng & Units 9/20/2019   IgE      6 - 495 IU/mL 8       DATE OF EXAM:  9/23/2019 8:55 AM     PROCEDURE:  XR CHEST PA AND LATERAL-     INDICATIONS:  Eosinophilia, secondary polycythemia, prostate cancer.       COMPARISON:  No Comparisons Available     TECHNIQUE:   Two radiologic views of the chest.     FINDINGS:  The heart size is normal. The pulmonary vascular markings are normal.  The lungs and pleural spaces are clear of active disease.  There is a  minor spondylosis of the thoracic spine.     IMPRESSION:  No active disease.     Electronically Signed By-Jori Marcos On:9/23/2019 9:05 AM  This report was finalized on [id number] by  Jori Marcos, .    Assessment/Plan   1.  Eosinophilia.  This was first identified on 8/19/2019.  The eosinophil count was mildly elevated ranging between 440 on 10/16/2019 and 640 today with normal being up to 400.  He does not have lymphadenopathy or skin rash.  No obvious allergic reaction although he is suspected of having possible reaction to pathogen in the CPAP tubing that improved after he started cleaning the tubing on a regular basis.    Patient had IgE level obtained and was normal at 8.  He was not able to have testing for stool ova and parasite.    The fluctuation in the eosinophil  count favors a reactive process rather than an underlying myeloproliferative disorder and I reassured the patient about this.    2.  Polycythemia.  The highest count was on 8/19/2019 with a hemoglobin of 17.4 and hematocrit of 50.6%.  Levels improved since that time.  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  Although he has been using the CPAP machine for the past year and a half, there was concern that the drop in the oxygen level is not completely corrected with his current CPAP machine and settings. Chest x-ray showed no evidence of underlying lung disease.    The patient had blood work-up on 9/20/2019 that showed no evidence of Feliciano 2 or MPL mutations.  The results make the possibility of underlying bone marrow pathology like myeloproliferative disorder unlikely.    PLAN:    1.  I recommended that he has follow-up with his sleep specialist to see if his CPAP machine settings would need to be adjusted.    2.  I recommended referral to ENT to evaluate possible nasal obstruction contributing to him not responding well to the CPAP machine.    3.  We discussed the need to increase hydration.  He does not drink enough fluids on a regular basis.  We also discussed cutting down on iron rich food.    4.  Due to the fact that vitamin C increases absorption of iron, I asked him to hold the vitamin C supplement and Solgar #7 supplement that he takes for his arthritis.    I will see the patient follow-up in 1 month with repeat CBC.      Jennifer Man MD  12/17/19

## 2019-12-19 ENCOUNTER — OFFICE VISIT (OUTPATIENT)
Dept: NEUROLOGY | Facility: CLINIC | Age: 69
End: 2019-12-19

## 2019-12-19 VITALS
WEIGHT: 221.6 LBS | HEART RATE: 78 BPM | BODY MASS INDEX: 30.68 KG/M2 | SYSTOLIC BLOOD PRESSURE: 128 MMHG | DIASTOLIC BLOOD PRESSURE: 93 MMHG

## 2019-12-19 DIAGNOSIS — G47.33 OBSTRUCTIVE SLEEP APNEA SYNDROME: Primary | ICD-10-CM

## 2019-12-19 DIAGNOSIS — D75.1 POLYCYTHEMIA: ICD-10-CM

## 2019-12-19 PROCEDURE — 99214 OFFICE O/P EST MOD 30 MIN: CPT | Performed by: PSYCHIATRY & NEUROLOGY

## 2019-12-19 NOTE — PROGRESS NOTES
Sleep medicine follow-up visit    Kingston Mancini   1950  69 y.o. male   DATE OF SERVICE: 12/19/2019     Chief complaint: agata treated with CPAP    On NPSG at [facility] , 12/26/2017 patient had Severe obstructive sleep apnea syndrome with apnea-hypopnea index of 29.4 per sleep hour, minimum SpO2 of 83%    The compliance data reviewed and the patient is on CPAP therapy at 8-14 cm/H2O and compliance data indicates excellent compliance with 86% usage for more than 4 hours with an average usage of 6 hours 45 minutes. AHI down to 2 with CPAP therapy and mean CPAP pressure 8.6 cm of water.  The patient's hypersomnia also resolved with Avon Sleepiness Scale score of 3 with CPAP therapy.  The patient feels great and is benefiting from it and is compliant.     Patient f/u with CPAP compliance, patient doing well with pap therapy. Pt. goes through [employer] for supplies and currently uses nasal pillows. Patient since been on the pap machine his RBC has increased 5.48 and feels like it could be a oxygen problem, went to a hematologist and wants his setting re-evaluated patient bought a cleaning machine and feels the machine is doing something to his sinuses. Patient going to a ENT in January.     Pt  Found to have polycythemia , hgb, 17.4, recently , hematologist suggested he have testing done to make sure his o2 is good at night.   Review of Systems   Constitutional: Negative for appetite change and fatigue.   HENT: Positive for congestion, sinus pressure and sinus pain.    Eyes: Negative for pain and itching.   Respiratory: Negative for cough and shortness of breath.    Gastrointestinal: Negative for constipation and diarrhea.   Endocrine: Negative for cold intolerance and heat intolerance.   Genitourinary: Negative for difficulty urinating and frequency.   Musculoskeletal: Negative for back pain and neck pain.   Allergic/Immunologic: Positive for environmental allergies. Negative for food allergies.   Neurological: Negative  for dizziness, tremors, seizures, syncope, facial asymmetry, speech difficulty, weakness, light-headedness, numbness and headaches.   Psychiatric/Behavioral: Negative for agitation and confusion.     I reviewed and addressed ROS entered by MA.        The following portions of the patient's history were reviewed and updated as appropriate: allergies, current medications, past family history, past medical history, past social history, past surgical history and problem list.      Family History   Problem Relation Age of Onset   • Cancer Other         Breast Cancer   • Diabetes Other    • Heart disease Other    • Hypertension Other    • Diverticulosis Other    • Other Other         Amyloidosis   • Breast cancer Mother 70   • Other Father         Amyliodosis   • Asthma Sister    • Heart attack Maternal Grandmother    • Kidney cancer Maternal Grandfather    • Diabetes Paternal Grandfather        Past Medical History:   Diagnosis Date   • Erectile dysfunction    • History of basal cell cancer 2002    Abstraction from [facility] Past Medical Hx states Basal Cell on ear   • History of cardiac cath 2006    Abstraction From [facility] Heart Cath   • History of degenerative disc disease     DDD   • Hyperlipidemia    • Hypertension    • AURELIA on CPAP 12/2017    AURELIA npsg Dec 2017 ahi 29 auto cpap 7-14 nasal mask   • Osteoarthritis    • Prostate cancer (CMS/[facility])     Lantry score 6, followed by Dr. Infante, age 59       Social History     Socioeconomic History   • Marital status:      Spouse name: Melodie   • Number of children: 3   • Years of education: Not on file   • Highest education level: Not on file   Occupational History   • Occupation: Retired     Employer: [employer]     Comment: [employer], retired 4/2014   Tobacco Use   • Smoking status: Never Smoker   • Smokeless tobacco: Never Used   Substance and Sexual Activity   • Alcohol use: Yes     Frequency: 4 or more times a week     Drinks per session: 1 or 2     Comment:  4-5 beer or glass wine daily   • Drug use: No   • Sexual activity: Defer         Current Outpatient Medications:   •  Black Pepper-Turmeric (TURMERIC COMPLEX/BLACK PEPPER PO), Take  by mouth., Disp: , Rfl:   •  cetirizine (ZYRTEC ALLERGY) 10 MG tablet, ZYRTEC ALLERGY 10 MG TABS, Disp: , Rfl:   •  Cholecalciferol (VITAMIN D3) 2000 units tablet, VITAMIN D3 2000 UNIT TABS, Disp: , Rfl:   •  Probiotic Product (PROBIOTIC-10 PO), Take  by mouth., Disp: , Rfl:   •  Unable to find, 1 each 1 (One) Time. simran 7, Disp: , Rfl:     Allergies   Allergen Reactions   • Mushroom Unknown - High Severity   • Penicillin G Anaphylaxis        PHYSICAL EXAMINATION:  Vitals:    12/19/19 0838   BP: 128/93   Pulse: 78      Body mass index is 30.68 kg/m².       HEENT: Normal.      EXTREMITIES: No edema.     IMPRESSION:    Patient with obstructive sleep apnea syndrome with hypersomnia successfully treated with CPAP therapy and is compliant and benefiting from it.     Mild polycythemia, new problem    RECOMMENDATIONS:   1. Continue present CPAP.   2. Follow up 1 year.   3. Will obtain o2 trend on room air with CPAP. (on CPAP titration study low o2 was 92%)    EPWORTH SLEEPINESS SCALE  Sitting and reading  0  WatchingTV  3  Sitting, inactive, in a public place  0  As a passenger in a car for 1 hour w/o a break  0  Lying down to rest in the afternoon  0  Sitting and talking to someone  0  Sitting quietly after a lunch  0  In a car, while stopped for traffic or a light  0  Total 3        This document has been electronically signed by Joseph Seipel, MD on December 19, 2019 8:59 AM

## 2019-12-20 ENCOUNTER — TELEPHONE (OUTPATIENT)
Dept: NEUROLOGY | Facility: CLINIC | Age: 69
End: 2019-12-20

## 2019-12-20 DIAGNOSIS — G47.33 OBSTRUCTIVE SLEEP APNEA: Primary | ICD-10-CM

## 2020-01-13 ENCOUNTER — TELEPHONE (OUTPATIENT)
Dept: FAMILY MEDICINE CLINIC | Facility: CLINIC | Age: 70
End: 2020-01-13

## 2020-01-13 RX ORDER — AZITHROMYCIN 250 MG/1
TABLET, FILM COATED ORAL
Qty: 6 TABLET | Refills: 0 | Status: SHIPPED | OUTPATIENT
Start: 2020-01-13 | End: 2020-04-29

## 2020-01-14 ENCOUNTER — LAB (OUTPATIENT)
Dept: LAB | Facility: HOSPITAL | Age: 70
End: 2020-01-14

## 2020-01-14 ENCOUNTER — OFFICE VISIT (OUTPATIENT)
Dept: ONCOLOGY | Facility: CLINIC | Age: 70
End: 2020-01-14

## 2020-01-14 VITALS
BODY MASS INDEX: 30.69 KG/M2 | SYSTOLIC BLOOD PRESSURE: 134 MMHG | RESPIRATION RATE: 16 BRPM | HEART RATE: 76 BPM | WEIGHT: 219.2 LBS | HEIGHT: 71 IN | DIASTOLIC BLOOD PRESSURE: 83 MMHG | TEMPERATURE: 97.3 F | OXYGEN SATURATION: 96 %

## 2020-01-14 DIAGNOSIS — D75.1 POLYCYTHEMIA: Primary | ICD-10-CM

## 2020-01-14 DIAGNOSIS — D72.10 EOSINOPHILIA: ICD-10-CM

## 2020-01-14 DIAGNOSIS — D75.1 POLYCYTHEMIA: ICD-10-CM

## 2020-01-14 LAB
BASOPHILS # BLD AUTO: 0.09 10*3/MM3 (ref 0–0.2)
BASOPHILS NFR BLD AUTO: 1.2 % (ref 0–1.5)
DEPRECATED RDW RBC AUTO: 39.8 FL (ref 37–54)
EOSINOPHIL # BLD AUTO: 0.56 10*3/MM3 (ref 0–0.4)
EOSINOPHIL NFR BLD AUTO: 7.6 % (ref 0.3–6.2)
ERYTHROCYTE [DISTWIDTH] IN BLOOD BY AUTOMATED COUNT: 12.3 % (ref 12.3–15.4)
HCT VFR BLD AUTO: 47.8 % (ref 37.5–51)
HGB BLD-MCNC: 16.6 G/DL (ref 13–17.7)
IMM GRANULOCYTES # BLD AUTO: 0.03 10*3/MM3 (ref 0–0.05)
IMM GRANULOCYTES NFR BLD AUTO: 0.4 % (ref 0–0.5)
LYMPHOCYTES # BLD AUTO: 1.45 10*3/MM3 (ref 0.7–3.1)
LYMPHOCYTES NFR BLD AUTO: 19.6 % (ref 19.6–45.3)
MCH RBC QN AUTO: 30.7 PG (ref 26.6–33)
MCHC RBC AUTO-ENTMCNC: 34.7 G/DL (ref 31.5–35.7)
MCV RBC AUTO: 88.4 FL (ref 79–97)
MONOCYTES # BLD AUTO: 0.5 10*3/MM3 (ref 0.1–0.9)
MONOCYTES NFR BLD AUTO: 6.8 % (ref 5–12)
NEUTROPHILS # BLD AUTO: 4.77 10*3/MM3 (ref 1.7–7)
NEUTROPHILS NFR BLD AUTO: 64.4 % (ref 42.7–76)
NRBC BLD AUTO-RTO: 0 /100 WBC (ref 0–0.2)
PLATELET # BLD AUTO: 218 10*3/MM3 (ref 140–450)
PMV BLD AUTO: 9.8 FL (ref 6–12)
RBC # BLD AUTO: 5.41 10*6/MM3 (ref 4.14–5.8)
WBC NRBC COR # BLD: 7.4 10*3/MM3 (ref 3.4–10.8)

## 2020-01-14 PROCEDURE — 36415 COLL VENOUS BLD VENIPUNCTURE: CPT

## 2020-01-14 PROCEDURE — 99213 OFFICE O/P EST LOW 20 MIN: CPT | Performed by: INTERNAL MEDICINE

## 2020-01-14 PROCEDURE — 85025 COMPLETE CBC W/AUTO DIFF WBC: CPT

## 2020-01-14 NOTE — PROGRESS NOTES
Subjective     CHIEF COMPLAINT:      Chief Complaint   Patient presents with   • Follow-up     no concerns       HISTORY OF PRESENT ILLNESS:     Kingston Mancini is a 69 y.o. male patient who returns today for follow up on his eosinophilia and polycythemia.  He returns today for follow-up reporting that he saw his sleep specialist and ENT surgeon.  He has sleep study done and O2 saturation decreased to 87%.  The pressure was reduced but he reports feeling more fatigued since that was done.    Patient's ENT started him on Nasacort and he has follow-up in 6 weeks.  He started taking Zithromax due to cough and yellow sputum as he easily develops bronchitis after an upper respiratory infection.    Patient stopped taking vitamin C and Zolgar No. 7  vitamin as instructed at the last visit.     REVIEW OF SYSTEMS:  Review of Systems   Constitutional: Positive for fatigue. Negative for chills, fever and unexpected weight change.   HENT: Negative for mouth sores, nosebleeds, sore throat and voice change.    Eyes: Negative for visual disturbance.   Respiratory: Positive for cough. Negative for shortness of breath.    Cardiovascular: Negative for chest pain and leg swelling.   Gastrointestinal: Negative for abdominal pain, blood in stool, constipation, diarrhea, nausea and vomiting.   Genitourinary: Negative for dysuria, frequency and hematuria.   Musculoskeletal: Negative for arthralgias, back pain and joint swelling.   Skin: Negative for rash.   Neurological: Negative for dizziness, numbness and headaches.   Hematological: Negative for adenopathy. Does not bruise/bleed easily.   Psychiatric/Behavioral: Negative for dysphoric mood. The patient is not nervous/anxious.      I verified the ROS obtained by the MA.      Past Medical History:   Diagnosis Date   • Erectile dysfunction    • History of basal cell cancer 2002    Abstraction from [facility] Past Medical Hx states Basal Cell on ear   • History of cardiac cath 2006     Abstraction From [facility] Heart Cath   • History of degenerative disc disease     DDD   • History of foreign travel     [place] July/August; [place] Oct/Nov   • Hyperlipidemia    • Hypertension    • AURELIA on CPAP 12/2017    AURELIA npsg Dec 2017 ahi 29 auto cpap 7-14 nasal mask   • Osteoarthritis    • Prostate cancer (CMS/HCC) 2011    Hussein score 6, followed by Dr. Infante, age 59       Past Surgical History:   Procedure Laterality Date   • CYST REMOVAL  01/09/2020   • HAND SURGERY      4/93, 10/93, 9/2007, 4/2013, 7/2013   • HERNIA REPAIR  2002   • OTHER SURGICAL HISTORY      Abstraction from [facility] Duputryns Contracture   • PROSTATECTOMY  2011       Cancer-related family history includes Breast cancer (age of onset: 67) in his mother; Cancer in his maternal grandmother, paternal grandfather, paternal grandmother, and another family member; Kidney cancer in his maternal grandfather.  Social History     Tobacco Use   • Smoking status: Never Smoker   • Smokeless tobacco: Never Used   Substance Use Topics   • Alcohol use: Yes     Frequency: 4 or more times a week     Drinks per session: 1 or 2     Comment: 4-5 beer or glass wine daily       MEDICATIONS:    Current Outpatient Medications:   •  azithromycin (ZITHROMAX) 250 MG tablet, Take 2 tablets the first day, then 1 tablet daily for 4 days., Disp: 6 tablet, Rfl: 0  •  Black Pepper-Turmeric (TURMERIC COMPLEX/BLACK PEPPER PO), Take  by mouth., Disp: , Rfl:   •  cetirizine (ZYRTEC ALLERGY) 10 MG tablet, , Disp: , Rfl:   •  Cholecalciferol (VITAMIN D3) 2000 units tablet, VITAMIN D3 2000 UNIT TABS, Disp: , Rfl:   •  Probiotic Product (PROBIOTIC-10 PO), Take  by mouth., Disp: , Rfl:   •  Triamcinolone Acetonide (NASACORT AQ NA), into the nostril(s) as directed by provider., Disp: , Rfl:     ALLERGIES:  Allergies   Allergen Reactions   • Mushroom Unknown - High Severity   • Penicillin G Anaphylaxis         Objective   VITAL SIGNS:     Vitals:    01/14/20 0835   BP:  "134/83   Pulse: 76   Resp: 16   Temp: 97.3 °F (36.3 °C)   TempSrc: Oral   SpO2: 96%   Weight: 99.4 kg (219 lb 3.2 oz)   Height: 181 cm (71.26\")   PainSc: 0-No pain     Body mass index is 30.35 kg/m².     Wt Readings from Last 3 Encounters:   01/14/20 99.4 kg (219 lb 3.2 oz)   12/19/19 101 kg (221 lb 9.6 oz)   12/17/19 101 kg (222 lb 8 oz)       PHYSICAL EXAMINATION:  GENERAL:  The patient appears in good general condition, not in acute distress.  SKIN: Warm and dry. No skin rashes. No ecchymosis.      DIAGNOSTIC DATA:     Results from last 7 days   Lab Units 01/14/20  0811   WBC 10*3/mm3 7.40   NEUTROS ABS 10*3/mm3 4.77   HEMOGLOBIN g/dL 16.6   HEMATOCRIT % 47.8   PLATELETS 10*3/mm3 218     Component      Latest Ref Rng & Units 9/20/2019 10/16/2019 12/17/2019 1/14/2020   Eosinophils Absolute      0.00 - 0.40 10*3/mm3 0.52 (H) 0.44 (H) 0.64 (H) 0.56 (H)       Assessment/Plan   1.  Eosinophilia.  This was first identified on 8/19/2019.  The eosinophil count was mildly elevated ranging between 440 on 10/16/2019 and 640 today with normal being up to 400.  He does not have lymphadenopathy or skin rash. Patient had IgE level obtained and was normal at 8.     Eosinophil count is down to 560 today.  It has improved compared to the highest count from 12/17/2019.    The fluctuation and improvement are indicating a reactive process.  I reassured the patient about the results.    2.  Polycythemia.    · The highest count was on 8/19/2019 with a hemoglobin of 17.4 and hematocrit of 50.6%.    · This is most consistent with secondary polycythemia due to his underlying sleep apnea syndrome.  Although he has been using the CPAP machine for the past year and a half, there was concern that the drop in the oxygen level is not completely corrected with his current CPAP machine and settings.   · Chest x-ray showed no evidence of underlying lung disease.  · The patient had blood work-up on 9/20/2019 that showed no evidence of Feliciano 2 or MPL " mutations.   · Hematocrit improved to 47.8% today.  · I reassured the patient that the increase in the RBC count is secondary to mild hypoxia due to sleep apnea syndrome and that he does not have evidence of underlying myeloproliferative disorder.    PLAN:    1.  Continue to maintain adequate hydration.  He will continue to avoid vitamin C and iron in supplements.  2.  He will continue to use the CPAP machine regularly.  He will contact his sleep specialist to see if the pressure can be increased.  3.  Patient is going to [place] to spend the winter.  He is returning in mid April 2020.  I will see him in late April 2020 with repeat CBC.  I would expect that eosinophil count would be improved at that point as he would be away from the allergens of the [facility].      Jennifer Man MD  01/14/20

## 2020-04-13 ENCOUNTER — TELEPHONE (OUTPATIENT)
Dept: ONCOLOGY | Facility: CLINIC | Age: 70
End: 2020-04-13

## 2020-04-13 DIAGNOSIS — D75.1 POLYCYTHEMIA: Primary | ICD-10-CM

## 2020-04-13 NOTE — TELEPHONE ENCOUNTER
Pt is in [place] he has an appt scheduled for labs / office visit on 4/29 pt wants to know if he can have his labs drawn there in [place] and do a video visit with Dr Man     Pt contact # [phone]

## 2020-04-13 NOTE — TELEPHONE ENCOUNTER
PT SPOKE TO GREER ABOUT HAVING HIS BLOOD WORK DONE IN [place].  SHE REQUESTED THE FAX NUMBER.    [facility]  FX- [phone]  - [phone]    ANY QUESTIONS PLEASE GIVE [place] CALL [phone].

## 2020-04-13 NOTE — TELEPHONE ENCOUNTER
Patient called back.  Please call patient when order has been faxed so he knows when he can go get labwork drawn.    [phone]

## 2020-04-13 NOTE — TELEPHONE ENCOUNTER
PT will be scheduled for a video visit on 4/29 instead of an in office visit. He will have his lab at a facility in [place]. Order faxed by MA pool with our fax number for results. Message sent to scheduling and MA pool. Pt V/U.

## 2020-04-20 ENCOUNTER — TELEMEDICINE (OUTPATIENT)
Dept: NEUROLOGY | Facility: CLINIC | Age: 70
End: 2020-04-20

## 2020-04-20 DIAGNOSIS — D75.1 POLYCYTHEMIA: ICD-10-CM

## 2020-04-20 DIAGNOSIS — G47.33 OBSTRUCTIVE SLEEP APNEA SYNDROME: Primary | ICD-10-CM

## 2020-04-20 PROCEDURE — 99213 OFFICE O/P EST LOW 20 MIN: CPT | Performed by: PSYCHIATRY & NEUROLOGY

## 2020-04-20 NOTE — PROGRESS NOTES
Sleep medicine follow-up visit    Kingston Mancini   1950  69 y.o. male   DATE OF SERVICE: 2020     Video Tele-Med visit    You have chosen to receive care through a telehealth visit.  Do you consent to use a video/audio connection for your medical care today? Yes    Chief complaint. agata treated with cpap      On NPSG at [facility] , 1017 patient had Moderate obstructive sleep apnea syndrome with apnea-hypopnea index of 29.4 per sleep hour, minimum SpO2 of 83%  DME  goulds nasal mask  The compliance data reviewed and the patient is on CPAP therapy at 8-14 cm/H2O and compliance data indicates excellent compliance with 100% usage for more than 4 hours with an average usage of 7 hours 15 minutes. AHI down to 2.0 with CPAP therapy and mean CPAP pressure 8.4 cm of water.      Pt having trouble with nasal congestion over past year, uses nasal mask,     No trouble with SOB. His hgb hct has been high, the o2 trend was ok and the cpap seems to be working well     Review of Systems   Constitutional: Negative for fever.   HENT: Positive for congestion.         Co nasal congestion. Uses flonase   Respiratory: Negative for cough and shortness of breath.    Neurological: Negative for light-headedness.   All other systems reviewed and are negative.    I reviewed and addressed ROS entered by MA.        The following portions of the patient's history were reviewed and updated as appropriate: allergies, current medications, past family history, past medical history, past social history, past surgical history and problem list.      Family History   Problem Relation Age of Onset   • Cancer Other         Breast Cancer   • Diabetes Other    • Heart disease Other    • Hypertension Other    • Diverticulosis Other    • Other Other         Amyloidosis   • Breast cancer Mother 67         age 70   • Other Father          at age 87 from amyliodosis   • Heart disease Father    • Asthma Sister    • Heart attack Maternal  Grandmother    • Cancer Maternal Grandmother    • Kidney cancer Maternal Grandfather    • Diabetes Paternal Grandfather    • Cancer Paternal Grandfather    • Cancer Paternal Grandmother        Past Medical History:   Diagnosis Date   • Erectile dysfunction    • History of basal cell cancer 2002    Abstraction from [facility] Past Medical Hx states Basal Cell on ear   • History of cardiac cath 2006    [facility]raction [facility] Heart Cath   • History of degenerative disc disease     DDD   • History of foreign travel     [place] July/August; [place] Oct/Nov   • Hyperlipidemia    • Hypertension    • AURELIA on CPAP 12/2017    AURELIA npsg Dec 2017 ahi 29 auto cpap 7-14 nasal mask   • Osteoarthritis    • Prostate cancer (CMS/[facility]) 2011    Kremlin score 6, followed by Dr. 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   Tobacco Use   • Smoking status: Never Smoker   • Smokeless tobacco: Never Used   Substance and Sexual Activity   • Alcohol use: Yes     Frequency: 4 or more times a week     Drinks per session: 1 or 2     Comment: 4-5 beer or glass wine daily   • Drug use: No   • Sexual activity: Defer         Current Outpatient Medications:   •  azithromycin (ZITHROMAX) 250 MG tablet, Take 2 tablets the first day, then 1 tablet daily for 4 days., Disp: 6 tablet, Rfl: 0  •  Black Pepper-Turmeric (TURMERIC COMPLEX/BLACK PEPPER PO), Take  by mouth., Disp: , Rfl:   •  cetirizine (ZYRTEC ALLERGY) 10 MG tablet, , Disp: , Rfl:   •  Cholecalciferol (VITAMIN D3) 2000 units tablet, VITAMIN D3 2000 UNIT TABS, Disp: , Rfl:   •  Probiotic Product (PROBIOTIC-10 PO), Take  by mouth., Disp: , Rfl:   •  Triamcinolone Acetonide (NASACORT AQ NA), into the nostril(s) as  directed by provider., Disp: , Rfl:     Allergies   Allergen Reactions   • Mushroom Unknown - High Severity   • Penicillin G Anaphylaxis        PHYSICAL EXAMINATION:    Temp 97.6  Weight 208     IMPRESSION:     Patient with obstructive sleep apnea syndrome with hypersomnia successfully treated with CPAP therapy and is compliant and benefiting from it.     Nasal congestion,  Weight stable    Polycythemia, no problem on download, o2 trend was ok last december    RECOMMENDATIONS:   1. Continue present CPAP.   2. Follow up 1 year.  3. Fu with ent, will refer to dr LIU  4. manage weight.      This document has been electronically signed by Joseph Seipel, MD on April 20, 2020 12:13

## 2020-04-22 ENCOUNTER — TELEPHONE (OUTPATIENT)
Dept: NEUROLOGY | Facility: CLINIC | Age: 70
End: 2020-04-22

## 2020-04-22 DIAGNOSIS — G47.33 OBSTRUCTIVE SLEEP APNEA SYNDROME: Primary | ICD-10-CM

## 2020-04-28 ENCOUNTER — TELEPHONE (OUTPATIENT)
Dept: ONCOLOGY | Facility: CLINIC | Age: 70
End: 2020-04-28

## 2020-04-28 NOTE — TELEPHONE ENCOUNTER
PT CALLING ABOUT LABS COLLECTED YESTERDAY. RESULTS ARE SCANNED TO HIS CHART ALREADY. WENT OVER RESULTS. PT WANTED THEM RELEASED TO [facility]. TOLD HIM WE DON'T HAVE CONTROL OVER WHEN THEY CROSS OVER. HE WROTE DOWN VALUES INSTEAD SO HE COULD GO OVER THEM W/ THE DOC.

## 2020-04-28 NOTE — TELEPHONE ENCOUNTER
Patient calling to be sure Dr. Man has labs done [facility] in [place], [place].  They told him the results would be faxed back to the office.They were drawn yesterday morning.  If he does not have them, he needs to reschedule this appointment.     Please call him asap to verify.    [phone]

## 2020-04-29 ENCOUNTER — APPOINTMENT (OUTPATIENT)
Dept: OTHER | Facility: HOSPITAL | Age: 70
End: 2020-04-29

## 2020-04-29 ENCOUNTER — TELEMEDICINE (OUTPATIENT)
Dept: ONCOLOGY | Facility: CLINIC | Age: 70
End: 2020-04-29

## 2020-04-29 VITALS — BODY MASS INDEX: 28.8 KG/M2 | WEIGHT: 208 LBS | TEMPERATURE: 97.8 F

## 2020-04-29 DIAGNOSIS — D72.10 EOSINOPHILIA: ICD-10-CM

## 2020-04-29 DIAGNOSIS — D75.1 SECONDARY POLYCYTHEMIA: Primary | ICD-10-CM

## 2020-04-29 PROCEDURE — 99213 OFFICE O/P EST LOW 20 MIN: CPT | Performed by: INTERNAL MEDICINE

## 2020-04-29 RX ORDER — FLUTICASONE PROPIONATE 50 MCG
2 SPRAY, SUSPENSION (ML) NASAL DAILY
COMMUNITY
End: 2020-10-06

## 2020-04-29 RX ORDER — LEVOCETIRIZINE DIHYDROCHLORIDE 5 MG/1
5 TABLET, FILM COATED ORAL EVERY EVENING
COMMUNITY
End: 2021-09-17

## 2020-04-29 NOTE — PROGRESS NOTES
Subjective     CHIEF COMPLAINT:      Polycythemia  Eosinophilia    HISTORY OF PRESENT ILLNESS:     Kingston Mancini is a 69 y.o. male patient who returns today for follow up on polycythemia and eosinophilia.      This was scheduled as a video visit due to patient safety concerns due to the coronavirus pandemic.    Patient reports problems with nasal and sinus congestion.  Although he is in [place], he is having allergy symptoms.  He had to switch from Zyrtec to Xyzal and from Nasacort to Flonase.  He started feeling somewhat better after the switch.    Patient is consistent with using the CPAP machine.  He is using a nasal cannula and is considering switching to a mask for the CPAP machine.      REVIEW OF SYSTEMS:  Review of Systems   Constitutional: Positive for unexpected weight change. Negative for fatigue and fever.        Intentional weight loss   HENT:        Nasal and sinus congestion   Respiratory: Negative for shortness of breath.    Cardiovascular: Negative for chest pain.        Past Medical History:   Diagnosis Date   • Erectile dysfunction    • History of basal cell cancer 2002    Abstraction from [facility] Past Medical Hx states Basal Cell on ear   • History of cardiac cath 2006    Abstraction From [facility] Heart Cath   • History of degenerative disc disease     DDD   • History of foreign travel     [place] July/August; [place] Oct/Nov   • Hyperlipidemia    • Hypertension    • AURELIA on CPAP 12/2017    AURELIA npsg Dec 2017 ahi 29 auto cpap 7-14 nasal mask   • Osteoarthritis    • Prostate cancer (CMS/HCC) 2011    [place]us[place] score 6, followed by Dr. Infante, age 59       Past Surgical History:   Procedure Laterality Date   • CYST REMOVAL  01/09/2020   • HAND SURGERY      4/93, 10/93, 9/2007, 4/2013, 7/2013   • HERNIA REPAIR  2002   • OTHER SURGICAL HISTORY      Abstraction from [facility] Duputryns Contracture   • PROSTATECTOMY  2011       Cancer-related family history includes Breast cancer (age of onset: 67) in  his mother; Cancer in his maternal grandmother, paternal grandfather, paternal grandmother, and another family member; Kidney cancer in his maternal grandfather.  Social History     Tobacco Use   • Smoking status: Never Smoker   • Smokeless tobacco: Never Used   Substance Use Topics   • Alcohol use: Yes     Frequency: 4 or more times a week     Drinks per session: 1 or 2     Comment: 4-5 beer or glass wine daily       MEDICATIONS:    Current Outpatient Medications:   •  azithromycin (ZITHROMAX) 250 MG tablet, Take 2 tablets the first day, then 1 tablet daily for 4 days., Disp: 6 tablet, Rfl: 0  •  Black Pepper-Turmeric (TURMERIC COMPLEX/BLACK PEPPER PO), Take  by mouth., Disp: , Rfl:   •  cetirizine (ZYRTEC ALLERGY) 10 MG tablet, , Disp: , Rfl:   •  Cholecalciferol (VITAMIN D3) 2000 units tablet, VITAMIN D3 2000 UNIT TABS, Disp: , Rfl:   •  Probiotic Product (PROBIOTIC-10 PO), Take  by mouth., Disp: , Rfl:   •  Triamcinolone Acetonide (NASACORT AQ NA), into the nostril(s) as directed by provider., Disp: , Rfl:     ALLERGIES:  Allergies   Allergen Reactions   • Mushroom Unknown - High Severity   • Penicillin G Anaphylaxis       Objective   VITAL SIGNS:     Vitals:    04/29/20 1305   Temp: 97.8 °F (36.6 °C)   Weight: 94.3 kg (208 lb)     Wt Readings from Last 3 Encounters:   04/29/20 94.3 kg (208 lb)   01/14/20 99.4 kg (219 lb 3.2 oz)   12/19/19 101 kg (221 lb 9.6 oz)       PHYSICAL EXAMINATION:  Physical Exam   Constitutional: He appears well-developed and well-nourished.   HENT:   Head: Normocephalic.   Eyes: Conjunctivae and EOM are normal.   Neck: Neck normal appearance.  Pulmonary/Chest: Effort normal.   Lymphadenopathy:     He has no cervical adenopathy.   Neurological: He is alert. No cranial nerve deficit. Coordination normal.   Skin: No rash noted. No erythema. No pallor.         DIAGNOSTIC DATA:     From 4/27/2020, WBC 6200 hemoglobin 17.0 hematocrit 50.7% platelets 256,000.  Absolute eosinophils  422.    Assessment/Plan   1.  Eosinophilia.  This was first identified on 8/19/2019.  The eosinophil count was mildly elevated ranging between 440 on 10/16/2019 and 640. Patient had IgE level obtained and was normal at 8.      Eosinophil count is down to 560 on 1/14/2020 and it is down to 422 on 4/27/2020.  The improvement in eosinophil count is indicating that this is reactive secondary to his underlying allergies.  I reassured the patient that there is no evidence of underlying bone marrow pathology.    2.  Secondary polycythemia.    · The highest count was on 8/19/2019 with a hemoglobin of 17.4 and hematocrit of 50.6%.    · It is attributed to his underlying sleep apnea syndrome.  He is using CPAP machine through nasal cannula.   · Chest x-ray on 9/23/2019 showed no evidence of underlying lung disease.  · The patient had blood work-up on 9/20/2019 that showed no evidence of Feliciano 2 or MPL mutations.   · Hematocrit was 47.8% on 1/14/2020.  · Hematocrit increased to 50.7% on 4/27/2020.  · Patient remains asymptomatic.    PLAN:    1.  I recommended to the patient that he donates 1 unit of blood.  2.  Continue maintaining adequate hydration.  3.  Continue to monitor intake of iron rich food.  At this point, I asked him he did not need to worry about vitamin C intake.  4.  Patient asked about switching to a mask for the CPAP machine.  I explained that this is most likely going to help with his nasal symptoms and possibly improve his polycythemia.  5.  Repeat CBC in 4 months.  We will see him in follow-up in 7-8 months with a CBC.    Total time of discussion 16 minutes.  Total time of visit 25 minutes      Jennifer Man MD  04/29/20

## 2020-05-22 ENCOUNTER — TRANSCRIBE ORDERS (OUTPATIENT)
Dept: LAB | Facility: HOSPITAL | Age: 70
End: 2020-05-22

## 2020-05-22 ENCOUNTER — LAB (OUTPATIENT)
Dept: LAB | Facility: HOSPITAL | Age: 70
End: 2020-05-22

## 2020-05-22 DIAGNOSIS — R51.9 NONINTRACTABLE HEADACHE, UNSPECIFIED CHRONICITY PATTERN, UNSPECIFIED HEADACHE TYPE: Primary | ICD-10-CM

## 2020-05-22 DIAGNOSIS — R51.9 NONINTRACTABLE HEADACHE, UNSPECIFIED CHRONICITY PATTERN, UNSPECIFIED HEADACHE TYPE: ICD-10-CM

## 2020-05-22 LAB
BASOPHILS # BLD AUTO: 0.1 10*3/MM3 (ref 0–0.2)
BASOPHILS NFR BLD AUTO: 1.3 % (ref 0–1.5)
CRP SERPL-MCNC: 0.42 MG/DL (ref 0–0.5)
DEPRECATED RDW RBC AUTO: 41.1 FL (ref 37–54)
EOSINOPHIL # BLD AUTO: 0.5 10*3/MM3 (ref 0–0.4)
EOSINOPHIL NFR BLD AUTO: 7.8 % (ref 0.3–6.2)
ERYTHROCYTE [DISTWIDTH] IN BLOOD BY AUTOMATED COUNT: 13.1 % (ref 12.3–15.4)
ERYTHROCYTE [SEDIMENTATION RATE] IN BLOOD: 10 MM/HR (ref 0–20)
HCT VFR BLD AUTO: 45 % (ref 37.5–51)
HGB BLD-MCNC: 15.8 G/DL (ref 13–17.7)
LYMPHOCYTES # BLD AUTO: 1.8 10*3/MM3 (ref 0.7–3.1)
LYMPHOCYTES NFR BLD AUTO: 28.8 % (ref 19.6–45.3)
MCH RBC QN AUTO: 31.1 PG (ref 26.6–33)
MCHC RBC AUTO-ENTMCNC: 35 G/DL (ref 31.5–35.7)
MCV RBC AUTO: 88.7 FL (ref 79–97)
MONOCYTES # BLD AUTO: 0.5 10*3/MM3 (ref 0.1–0.9)
MONOCYTES NFR BLD AUTO: 8.5 % (ref 5–12)
NEUTROPHILS # BLD AUTO: 3.4 10*3/MM3 (ref 1.7–7)
NEUTROPHILS NFR BLD AUTO: 53.6 % (ref 42.7–76)
NRBC BLD AUTO-RTO: 0.1 /100 WBC (ref 0–0.2)
PLATELET # BLD AUTO: 247 10*3/MM3 (ref 140–450)
PMV BLD AUTO: 7 FL (ref 6–12)
RBC # BLD AUTO: 5.07 10*6/MM3 (ref 4.14–5.8)
WBC NRBC COR # BLD: 6.3 10*3/MM3 (ref 3.4–10.8)

## 2020-05-22 PROCEDURE — 86140 C-REACTIVE PROTEIN: CPT

## 2020-05-22 PROCEDURE — 36415 COLL VENOUS BLD VENIPUNCTURE: CPT

## 2020-05-22 PROCEDURE — 85025 COMPLETE CBC W/AUTO DIFF WBC: CPT

## 2020-05-22 PROCEDURE — 85652 RBC SED RATE AUTOMATED: CPT

## 2020-06-24 ENCOUNTER — TELEPHONE (OUTPATIENT)
Dept: NEUROLOGY | Facility: CLINIC | Age: 70
End: 2020-06-24

## 2020-06-24 DIAGNOSIS — G47.33 OBSTRUCTIVE SLEEP APNEA: Primary | ICD-10-CM

## 2020-06-24 NOTE — TELEPHONE ENCOUNTER
Pt called still having sinus issues despite ENT consult. Changing to FF mask and after reviewing SCR Dr Seipel changed pressures. Dr. Seipel also reviewing Dr Man's notes and labs. Pt to call back with up date.

## 2020-07-06 ENCOUNTER — LAB (OUTPATIENT)
Dept: LAB | Facility: HOSPITAL | Age: 70
End: 2020-07-06

## 2020-07-06 ENCOUNTER — TRANSCRIBE ORDERS (OUTPATIENT)
Dept: LAB | Facility: HOSPITAL | Age: 70
End: 2020-07-06

## 2020-07-06 DIAGNOSIS — J38.3 OTHER DISEASES OF VOCAL CORDS: ICD-10-CM

## 2020-07-06 DIAGNOSIS — R07.0 THROAT PAIN: Primary | ICD-10-CM

## 2020-07-06 DIAGNOSIS — R07.0 THROAT PAIN: ICD-10-CM

## 2020-07-06 LAB
ANION GAP SERPL CALCULATED.3IONS-SCNC: 9.2 MMOL/L (ref 5–15)
BASOPHILS # BLD AUTO: 0.05 10*3/MM3 (ref 0–0.2)
BASOPHILS NFR BLD AUTO: 0.8 % (ref 0–1.5)
BUN SERPL-MCNC: 25 MG/DL (ref 8–23)
BUN/CREAT SERPL: 21.6 (ref 7–25)
CALCIUM SPEC-SCNC: 9.7 MG/DL (ref 8.6–10.5)
CHLORIDE SERPL-SCNC: 105 MMOL/L (ref 98–107)
CO2 SERPL-SCNC: 25.8 MMOL/L (ref 22–29)
CREAT SERPL-MCNC: 1.16 MG/DL (ref 0.76–1.27)
DEPRECATED RDW RBC AUTO: 38.6 FL (ref 37–54)
EOSINOPHIL # BLD AUTO: 0.3 10*3/MM3 (ref 0–0.4)
EOSINOPHIL NFR BLD AUTO: 5.1 % (ref 0.3–6.2)
ERYTHROCYTE [DISTWIDTH] IN BLOOD BY AUTOMATED COUNT: 12 % (ref 12.3–15.4)
GFR SERPL CREATININE-BSD FRML MDRD: 63 ML/MIN/1.73
GLUCOSE SERPL-MCNC: 84 MG/DL (ref 65–99)
HCT VFR BLD AUTO: 45.9 % (ref 37.5–51)
HGB BLD-MCNC: 15.7 G/DL (ref 13–17.7)
IMM GRANULOCYTES # BLD AUTO: 0.03 10*3/MM3 (ref 0–0.05)
IMM GRANULOCYTES NFR BLD AUTO: 0.5 % (ref 0–0.5)
LYMPHOCYTES # BLD AUTO: 1.16 10*3/MM3 (ref 0.7–3.1)
LYMPHOCYTES NFR BLD AUTO: 19.6 % (ref 19.6–45.3)
MCH RBC QN AUTO: 30.3 PG (ref 26.6–33)
MCHC RBC AUTO-ENTMCNC: 34.2 G/DL (ref 31.5–35.7)
MCV RBC AUTO: 88.6 FL (ref 79–97)
MONOCYTES # BLD AUTO: 0.57 10*3/MM3 (ref 0.1–0.9)
MONOCYTES NFR BLD AUTO: 9.6 % (ref 5–12)
NEUTROPHILS NFR BLD AUTO: 3.82 10*3/MM3 (ref 1.7–7)
NEUTROPHILS NFR BLD AUTO: 64.4 % (ref 42.7–76)
NRBC BLD AUTO-RTO: 0 /100 WBC (ref 0–0.2)
PLATELET # BLD AUTO: 244 10*3/MM3 (ref 140–450)
PMV BLD AUTO: 10.1 FL (ref 6–12)
POTASSIUM SERPL-SCNC: 4.4 MMOL/L (ref 3.5–5.2)
RBC # BLD AUTO: 5.18 10*6/MM3 (ref 4.14–5.8)
SODIUM SERPL-SCNC: 140 MMOL/L (ref 136–145)
WBC # BLD AUTO: 5.93 10*3/MM3 (ref 3.4–10.8)

## 2020-07-06 PROCEDURE — U0003 INFECTIOUS AGENT DETECTION BY NUCLEIC ACID (DNA OR RNA); SEVERE ACUTE RESPIRATORY SYNDROME CORONAVIRUS 2 (SARS-COV-2) (CORONAVIRUS DISEASE [COVID-19]), AMPLIFIED PROBE TECHNIQUE, MAKING USE OF HIGH THROUGHPUT TECHNOLOGIES AS DESCRIBED BY CMS-2020-01-R: HCPCS

## 2020-07-06 PROCEDURE — C9803 HOPD COVID-19 SPEC COLLECT: HCPCS

## 2020-07-06 PROCEDURE — 80048 BASIC METABOLIC PNL TOTAL CA: CPT

## 2020-07-06 PROCEDURE — 85025 COMPLETE CBC W/AUTO DIFF WBC: CPT

## 2020-07-06 PROCEDURE — 36415 COLL VENOUS BLD VENIPUNCTURE: CPT

## 2020-07-07 LAB
COVID LABCORP PRIORITY: NORMAL
SARS-COV-2 RNA RESP QL NAA+PROBE: NOT DETECTED

## 2020-07-14 ENCOUNTER — LAB REQUISITION (OUTPATIENT)
Dept: LAB | Facility: HOSPITAL | Age: 70
End: 2020-07-14

## 2020-07-14 DIAGNOSIS — J38.3 OTHER DISEASES OF VOCAL CORDS: ICD-10-CM

## 2020-07-14 DIAGNOSIS — R07.0 PAIN IN THROAT: ICD-10-CM

## 2020-07-14 PROCEDURE — 88312 SPECIAL STAINS GROUP 1: CPT | Performed by: OTOLARYNGOLOGY

## 2020-07-14 PROCEDURE — 88305 TISSUE EXAM BY PATHOLOGIST: CPT | Performed by: OTOLARYNGOLOGY

## 2020-07-15 PROBLEM — C61 PROSTATE CANCER: Status: RESOLVED | Noted: 2019-07-05 | Resolved: 2020-07-15

## 2020-07-15 LAB
LAB AP CASE REPORT: NORMAL
PATH REPORT.FINAL DX SPEC: NORMAL
PATH REPORT.GROSS SPEC: NORMAL

## 2020-07-15 RX ORDER — OMEPRAZOLE 40 MG/1
40 CAPSULE, DELAYED RELEASE ORAL DAILY
COMMUNITY
Start: 2020-06-30

## 2020-08-07 ENCOUNTER — TELEPHONE (OUTPATIENT)
Dept: NEUROLOGY | Facility: CLINIC | Age: 70
End: 2020-08-07

## 2020-08-07 NOTE — TELEPHONE ENCOUNTER
Pt called not resting well, c/o being tired. Wants a report on cpap checked. Report ran, Dr. Seipel reviewed it and changes made in pressure.

## 2020-08-14 ENCOUNTER — TELEPHONE (OUTPATIENT)
Dept: GENERAL RADIOLOGY | Facility: HOSPITAL | Age: 70
End: 2020-08-14

## 2020-08-14 NOTE — TELEPHONE ENCOUNTER
----- Message from Angeline Jeronimo sent at 8/13/2020  4:29 PM EDT -----  Pt has lab on 8-19, want to some earlier in the morning or on a different day, has to be somewhere by 11 that day.  Call him to reschedule [phone]

## 2020-08-19 ENCOUNTER — APPOINTMENT (OUTPATIENT)
Dept: LAB | Facility: HOSPITAL | Age: 70
End: 2020-08-19

## 2020-08-19 ENCOUNTER — LAB (OUTPATIENT)
Dept: LAB | Facility: HOSPITAL | Age: 70
End: 2020-08-19

## 2020-08-19 DIAGNOSIS — D75.1 SECONDARY POLYCYTHEMIA: ICD-10-CM

## 2020-08-19 LAB
BASOPHILS # BLD AUTO: 0.05 10*3/MM3 (ref 0–0.2)
BASOPHILS NFR BLD AUTO: 0.8 % (ref 0–1.5)
DEPRECATED RDW RBC AUTO: 39.8 FL (ref 37–54)
EOSINOPHIL # BLD AUTO: 0.43 10*3/MM3 (ref 0–0.4)
EOSINOPHIL NFR BLD AUTO: 6.9 % (ref 0.3–6.2)
ERYTHROCYTE [DISTWIDTH] IN BLOOD BY AUTOMATED COUNT: 12.5 % (ref 12.3–15.4)
HCT VFR BLD AUTO: 46 % (ref 37.5–51)
HGB BLD-MCNC: 15.9 G/DL (ref 13–17.7)
IMM GRANULOCYTES # BLD AUTO: 0.03 10*3/MM3 (ref 0–0.05)
IMM GRANULOCYTES NFR BLD AUTO: 0.5 % (ref 0–0.5)
LYMPHOCYTES # BLD AUTO: 1.33 10*3/MM3 (ref 0.7–3.1)
LYMPHOCYTES NFR BLD AUTO: 21.4 % (ref 19.6–45.3)
MCH RBC QN AUTO: 30.1 PG (ref 26.6–33)
MCHC RBC AUTO-ENTMCNC: 34.6 G/DL (ref 31.5–35.7)
MCV RBC AUTO: 87.1 FL (ref 79–97)
MONOCYTES # BLD AUTO: 0.59 10*3/MM3 (ref 0.1–0.9)
MONOCYTES NFR BLD AUTO: 9.5 % (ref 5–12)
NEUTROPHILS NFR BLD AUTO: 3.79 10*3/MM3 (ref 1.7–7)
NEUTROPHILS NFR BLD AUTO: 60.9 % (ref 42.7–76)
NRBC BLD AUTO-RTO: 0 /100 WBC (ref 0–0.2)
PLATELET # BLD AUTO: 203 10*3/MM3 (ref 140–450)
PMV BLD AUTO: 9.6 FL (ref 6–12)
RBC # BLD AUTO: 5.28 10*6/MM3 (ref 4.14–5.8)
WBC # BLD AUTO: 6.22 10*3/MM3 (ref 3.4–10.8)

## 2020-08-19 PROCEDURE — 36415 COLL VENOUS BLD VENIPUNCTURE: CPT

## 2020-08-19 PROCEDURE — 85025 COMPLETE CBC W/AUTO DIFF WBC: CPT

## 2020-08-25 ENCOUNTER — OFFICE (AMBULATORY)
Dept: URBAN - METROPOLITAN AREA CLINIC 64 | Facility: CLINIC | Age: 70
End: 2020-08-25
Payer: COMMERCIAL

## 2020-08-25 VITALS
SYSTOLIC BLOOD PRESSURE: 113 MMHG | HEIGHT: 72 IN | HEART RATE: 79 BPM | WEIGHT: 201 LBS | DIASTOLIC BLOOD PRESSURE: 82 MMHG

## 2020-08-25 DIAGNOSIS — R13.10 DYSPHAGIA, UNSPECIFIED: ICD-10-CM

## 2020-08-25 DIAGNOSIS — K21.9 GASTRO-ESOPHAGEAL REFLUX DISEASE WITHOUT ESOPHAGITIS: ICD-10-CM

## 2020-08-25 PROCEDURE — 99213 OFFICE O/P EST LOW 20 MIN: CPT | Performed by: INTERNAL MEDICINE

## 2020-08-28 ENCOUNTER — ON CAMPUS - OUTPATIENT (AMBULATORY)
Dept: URBAN - METROPOLITAN AREA HOSPITAL 2 | Facility: HOSPITAL | Age: 70
End: 2020-08-28
Payer: COMMERCIAL

## 2020-08-28 VITALS
DIASTOLIC BLOOD PRESSURE: 93 MMHG | HEART RATE: 66 BPM | HEART RATE: 67 BPM | TEMPERATURE: 97.7 F | HEART RATE: 64 BPM | SYSTOLIC BLOOD PRESSURE: 129 MMHG | DIASTOLIC BLOOD PRESSURE: 99 MMHG | SYSTOLIC BLOOD PRESSURE: 133 MMHG | RESPIRATION RATE: 18 BRPM | SYSTOLIC BLOOD PRESSURE: 132 MMHG | OXYGEN SATURATION: 100 % | HEART RATE: 62 BPM | SYSTOLIC BLOOD PRESSURE: 141 MMHG | HEART RATE: 72 BPM | SYSTOLIC BLOOD PRESSURE: 140 MMHG | OXYGEN SATURATION: 99 % | DIASTOLIC BLOOD PRESSURE: 80 MMHG | RESPIRATION RATE: 19 BRPM | DIASTOLIC BLOOD PRESSURE: 105 MMHG | SYSTOLIC BLOOD PRESSURE: 136 MMHG | DIASTOLIC BLOOD PRESSURE: 86 MMHG | WEIGHT: 199 LBS | HEIGHT: 72 IN | HEART RATE: 57 BPM | OXYGEN SATURATION: 98 % | RESPIRATION RATE: 16 BRPM | OXYGEN SATURATION: 97 %

## 2020-08-28 DIAGNOSIS — K21.9 GASTRO-ESOPHAGEAL REFLUX DISEASE WITHOUT ESOPHAGITIS: ICD-10-CM

## 2020-08-28 DIAGNOSIS — R13.10 DYSPHAGIA, UNSPECIFIED: ICD-10-CM

## 2020-08-28 PROCEDURE — 43450 DILATE ESOPHAGUS 1/MULT PASS: CPT | Performed by: INTERNAL MEDICINE

## 2020-08-28 PROCEDURE — 43235 EGD DIAGNOSTIC BRUSH WASH: CPT | Performed by: INTERNAL MEDICINE

## 2020-10-06 ENCOUNTER — OFFICE VISIT (OUTPATIENT)
Dept: FAMILY MEDICINE CLINIC | Facility: CLINIC | Age: 70
End: 2020-10-06

## 2020-10-06 ENCOUNTER — LAB (OUTPATIENT)
Dept: FAMILY MEDICINE CLINIC | Facility: CLINIC | Age: 70
End: 2020-10-06

## 2020-10-06 VITALS
TEMPERATURE: 98 F | HEART RATE: 62 BPM | BODY MASS INDEX: 28 KG/M2 | SYSTOLIC BLOOD PRESSURE: 143 MMHG | RESPIRATION RATE: 16 BRPM | WEIGHT: 200 LBS | OXYGEN SATURATION: 98 % | HEIGHT: 71 IN | DIASTOLIC BLOOD PRESSURE: 93 MMHG

## 2020-10-06 DIAGNOSIS — E78.2 MIXED HYPERLIPIDEMIA: ICD-10-CM

## 2020-10-06 DIAGNOSIS — Z11.59 NEED FOR HEPATITIS C SCREENING TEST: ICD-10-CM

## 2020-10-06 DIAGNOSIS — Z23 NEED FOR VACCINATION: ICD-10-CM

## 2020-10-06 DIAGNOSIS — I10 ESSENTIAL HYPERTENSION: ICD-10-CM

## 2020-10-06 DIAGNOSIS — E55.9 VITAMIN D DEFICIENCY: ICD-10-CM

## 2020-10-06 DIAGNOSIS — Z85.46 PERSONAL HISTORY OF PROSTATE CANCER: ICD-10-CM

## 2020-10-06 DIAGNOSIS — Z00.00 MEDICARE ANNUAL WELLNESS VISIT, SUBSEQUENT: Primary | ICD-10-CM

## 2020-10-06 PROBLEM — Z99.89 OSA ON CPAP: Status: ACTIVE | Noted: 2017-11-10

## 2020-10-06 PROBLEM — Z80.42 FAMILY HISTORY OF PROSTATE CANCER: Status: ACTIVE | Noted: 2020-10-06

## 2020-10-06 PROBLEM — J01.00 SINUSITIS, ACUTE MAXILLARY: Status: RESOLVED | Noted: 2017-04-15 | Resolved: 2020-10-06

## 2020-10-06 LAB
25(OH)D3 SERPL-MCNC: 58.3 NG/ML (ref 30–100)
ALBUMIN SERPL-MCNC: 4.3 G/DL (ref 3.5–5.2)
ALBUMIN/GLOB SERPL: 1.6 G/DL
ALP SERPL-CCNC: 77 U/L (ref 39–117)
ALT SERPL W P-5'-P-CCNC: 18 U/L (ref 1–41)
ANION GAP SERPL CALCULATED.3IONS-SCNC: 9.4 MMOL/L (ref 5–15)
AST SERPL-CCNC: 22 U/L (ref 1–40)
BILIRUB SERPL-MCNC: 0.4 MG/DL (ref 0–1.2)
BILIRUB UR QL STRIP: NEGATIVE
BUN SERPL-MCNC: 25 MG/DL (ref 8–23)
BUN/CREAT SERPL: 21.9 (ref 7–25)
CALCIUM SPEC-SCNC: 9.4 MG/DL (ref 8.6–10.5)
CHLORIDE SERPL-SCNC: 103 MMOL/L (ref 98–107)
CHOLEST SERPL-MCNC: 142 MG/DL (ref 0–200)
CLARITY UR: CLEAR
CO2 SERPL-SCNC: 27.6 MMOL/L (ref 22–29)
COLOR UR: YELLOW
CREAT SERPL-MCNC: 1.14 MG/DL (ref 0.76–1.27)
GFR SERPL CREATININE-BSD FRML MDRD: 64 ML/MIN/1.73
GLOBULIN UR ELPH-MCNC: 2.7 GM/DL
GLUCOSE SERPL-MCNC: 94 MG/DL (ref 65–99)
GLUCOSE UR STRIP-MCNC: NEGATIVE MG/DL
HCV AB SER DONR QL: NORMAL
HDLC SERPL-MCNC: 36 MG/DL (ref 40–60)
HGB UR QL STRIP.AUTO: NEGATIVE
KETONES UR QL STRIP: NEGATIVE
LDLC SERPL CALC-MCNC: 88 MG/DL (ref 0–100)
LDLC/HDLC SERPL: 2.44 {RATIO}
LEUKOCYTE ESTERASE UR QL STRIP.AUTO: NEGATIVE
NITRITE UR QL STRIP: NEGATIVE
PH UR STRIP.AUTO: 6 [PH] (ref 5–8)
POTASSIUM SERPL-SCNC: 4.4 MMOL/L (ref 3.5–5.2)
PROT SERPL-MCNC: 7 G/DL (ref 6–8.5)
PROT UR QL STRIP: NEGATIVE
PSA SERPL-MCNC: <0.014 NG/ML (ref 0–4)
SODIUM SERPL-SCNC: 140 MMOL/L (ref 136–145)
SP GR UR STRIP: 1.02 (ref 1–1.03)
TRIGL SERPL-MCNC: 91 MG/DL (ref 0–150)
TSH SERPL DL<=0.05 MIU/L-ACNC: 1.53 UIU/ML (ref 0.27–4.2)
UROBILINOGEN UR QL STRIP: NORMAL
VIT B12 BLD-MCNC: 590 PG/ML (ref 211–946)
VLDLC SERPL-MCNC: 18.2 MG/DL (ref 5–40)

## 2020-10-06 PROCEDURE — 84443 ASSAY THYROID STIM HORMONE: CPT | Performed by: FAMILY MEDICINE

## 2020-10-06 PROCEDURE — 81003 URINALYSIS AUTO W/O SCOPE: CPT | Performed by: FAMILY MEDICINE

## 2020-10-06 PROCEDURE — 82306 VITAMIN D 25 HYDROXY: CPT | Performed by: FAMILY MEDICINE

## 2020-10-06 PROCEDURE — 84153 ASSAY OF PSA TOTAL: CPT | Performed by: FAMILY MEDICINE

## 2020-10-06 PROCEDURE — G0439 PPPS, SUBSEQ VISIT: HCPCS | Performed by: FAMILY MEDICINE

## 2020-10-06 PROCEDURE — 90732 PPSV23 VACC 2 YRS+ SUBQ/IM: CPT | Performed by: FAMILY MEDICINE

## 2020-10-06 PROCEDURE — 86803 HEPATITIS C AB TEST: CPT | Performed by: FAMILY MEDICINE

## 2020-10-06 PROCEDURE — 36415 COLL VENOUS BLD VENIPUNCTURE: CPT | Performed by: FAMILY MEDICINE

## 2020-10-06 PROCEDURE — G0009 ADMIN PNEUMOCOCCAL VACCINE: HCPCS | Performed by: FAMILY MEDICINE

## 2020-10-06 PROCEDURE — 80061 LIPID PANEL: CPT | Performed by: FAMILY MEDICINE

## 2020-10-06 PROCEDURE — 82607 VITAMIN B-12: CPT | Performed by: FAMILY MEDICINE

## 2020-10-06 PROCEDURE — 80053 COMPREHEN METABOLIC PANEL: CPT | Performed by: FAMILY MEDICINE

## 2020-10-06 NOTE — PROGRESS NOTES
Subsequent Medicare Wellness Visit   The ABC's of the Annual Wellness Visit    Chief Complaint   Patient presents with   • Establish Care   • Medicare Wellness-subsequent       HPI:  Kingston Mancini, -1950, is a 69 y.o. male who presents for a Subsequent Medicare Wellness Visit.  He is coming to get established today and we are reviewing his medical problems and his medications.  He was diagnosed with prostate cancer over 10 years ago and was treated with complete prostatectomy and has been doing well since then.  He is currently followed by few different specialists due to his allergies and sleep apnea and also some blood dyscrasia.  He reports to be doing pretty well overall.  He is staying pretty active and he is trying to keep up with healthy diet.  He denies any issues with balance or any falls.  He denies any memory problems. Patient denies any chest pain, shortness of breath, dizziness, nausea, vomiting, or diarrhea. No visual issues reported. No headaches, numbness or tingling. No urinary issues. Patient has good appetite and denies any weight changes. No swelling reported. No emotional issues.        Recent Hospitalizations:  No hospitalization(s) within the last year..    Current Medical Providers:  Patient Care Team:  Steff Singletary MD as PCP - General (Family Medicine)  Papa Brooks Jr., MD as PCP - Claims Attributed  Jennifer Man MD as Consulting Physician (Hematology and Oncology)  Cristóbal Infante MD as Consulting Physician (Urology)  Seipel, Joseph F, MD as Consulting Physician (Sleep Medicine)  Mono Moreno MD as Consulting Physician (Otolaryngology)  Akira Reyes MD as Consulting Physician (Gastroenterology)    Health Habits and Functional and Cognitive Screening and Depression Screening:  No flowsheet data found.    Compared to one year ago, the patient feels his physical health is the same and his mental health is the same.    Depression  Screen:  PHQ-2/PHQ-9 Depression Screening 10/6/2020   Little interest or pleasure in doing things 0   Feeling down, depressed, or hopeless 0   Total Score 0         Past Medical/Family/Social History:  The following portions of the patient's history were reviewed and updated as appropriate: allergies, current medications, past family history, past medical history, past social history, past surgical history and problem list.    Allergies   Allergen Reactions   • Mushroom Unknown - High Severity   • Penicillin G Anaphylaxis         Current Outpatient Medications:   •  Black Pepper-Turmeric (TURMERIC COMPLEX/BLACK PEPPER PO), Take  by mouth., Disp: , Rfl:   •  Cholecalciferol (VITAMIN D3) 2000 units tablet, VITAMIN D3 2000 UNIT TABS, Disp: , Rfl:   •  levocetirizine (XYZAL) 5 MG tablet, Take 5 mg by mouth Every Evening., Disp: , Rfl:   •  omeprazole (priLOSEC) 40 MG capsule, Take 40 mg by mouth Daily. before a meal, Disp: , Rfl:   •  Probiotic Product (PROBIOTIC-10 PO), Take  by mouth., Disp: , Rfl:   No current facility-administered medications for this visit.     Aspirin use counseling: Does not need ASA (and currently is not on it)    Current medication list contains no high risk medications.  No harmful drug interactions have been identified.     Family History   Problem Relation Age of Onset   • Cancer Other         Breast Cancer   • Diabetes Other    • Heart disease Other    • Hypertension Other    • Diverticulosis Other    • Other Other         Amyloidosis   • Breast cancer Mother 67         age 70   • Other Father          at age 87 from amyliodosis   • Heart disease Father    • Asthma Sister    • Heart attack Maternal Grandmother    • Cancer Maternal Grandmother    • Kidney cancer Maternal Grandfather    • Diabetes Paternal Grandfather    • Cancer Paternal Grandfather    • Cancer Paternal Grandmother        Social History     Tobacco Use   • Smoking status: Never Smoker   • Smokeless tobacco:  "Never Used   Substance Use Topics   • Alcohol use: Yes     Frequency: 4 or more times a week     Drinks per session: 1 or 2     Comment: 4-5 beer or glass wine daily       Past Surgical History:   Procedure Laterality Date   • CYST REMOVAL  01/09/2020   • HAND SURGERY      4/93, 10/93, 9/2007, 4/2013, 7/2013   • HERNIA REPAIR  2002   • OTHER SURGICAL HISTORY      Abstraction from [facility] Duputryns Contracture   • PROSTATECTOMY  2011       Patient Active Problem List   Diagnosis   • Acute bacterial bronchitis   • DDD (degenerative disc disease), cervical   • Derangement of knee   • Diverticulitis   • Medicare annual wellness visit, subsequent   • Erectile dysfunction   • Hay fever   • Hyperlipidemia   • Hypertension   • Neck pain, acute   • AURELIA on CPAP   • Osteoarthritis   • Rash   • Vitamin D deficiency   • Eosinophilia   • Vitamin B12 deficiency   • Secondary polycythemia   • Personal history of prostate cancer   • Need for hepatitis C screening test   • Need for vaccination       Review of Systems   Constitutional: Negative for activity change, fatigue and fever.   Respiratory: Negative for cough, shortness of breath and wheezing.    Cardiovascular: Negative for chest pain, palpitations and leg swelling.   Gastrointestinal: Negative for constipation, diarrhea and indigestion.   Skin: Negative for color change, dry skin and rash.   Allergic/Immunologic: Positive for environmental allergies.   Neurological: Negative for tremors and headache.       Objective     Vitals:    10/06/20 0824   BP: 143/93   BP Location: Left arm   Patient Position: Sitting   Cuff Size: Large Adult   Pulse: 62   Resp: 16   Temp: 98 °F (36.7 °C)   TempSrc: Temporal   SpO2: 98%   Weight: 90.7 kg (200 lb)   Height: 179.1 cm (70.5\")       Patient's Body mass index is 28.29 kg/m². BMI is within normal parameters. No follow-up required..      No exam data present    The patient has no evidence of cognitve impairment.     Physical Exam  Vitals " signs and nursing note reviewed.   Constitutional:       Appearance: Normal appearance. He is well-developed.   HENT:      Head: Normocephalic and atraumatic.   Eyes:      Conjunctiva/sclera: Conjunctivae normal.      Pupils: Pupils are equal, round, and reactive to light.   Neck:      Musculoskeletal: Normal range of motion and neck supple.   Cardiovascular:      Rate and Rhythm: Normal rate and regular rhythm.      Heart sounds: Normal heart sounds. No murmur. No gallop.    Pulmonary:      Effort: Pulmonary effort is normal. No respiratory distress.      Breath sounds: Normal breath sounds. No wheezing, rhonchi or rales.   Chest:      Chest wall: No tenderness.   Musculoskeletal: Normal range of motion.         General: No swelling or deformity.   Skin:     General: Skin is warm and dry.   Neurological:      General: No focal deficit present.      Mental Status: He is alert and oriented to person, place, and time.         Recent Lab Results:     Lab Results   Component Value Date    CHOL 176 08/15/2019    TRIG 170 (H) 08/15/2019    HDL 39 08/15/2019    VLDL 34 08/15/2019    LDLHDL 2.64 08/15/2019       Assessment/Plan   Age-appropriate Screening Schedule:  Refer to the list below for future screening recommendations based on patient's age, sex and/or medical conditions.      Health Maintenance   Topic Date Due   • LIPID PANEL  08/15/2020   • TDAP/TD VACCINES (1 - Tdap) 10/12/2021 (Originally 10/24/1969)   • COLONOSCOPY  04/04/2028   • INFLUENZA VACCINE  Completed   • ZOSTER VACCINE  Completed       Medicare Risks and Personalized Health Plan:  Advance Directive Discussion  Cardiovascular risk  Colon Cancer Screening  Dementia/Memory   Depression/Dysphoria  Diabetic Lab Screening   Fall Risk  Immunizations Discussed/Encouraged (specific immunizations; Pneumococcal 23 )      CMS-Preventive Services Quick Reference  Medicare Preventive Services Addressed:  Annual Wellness Visit (AWV)  Cardiovascular Disease Screening  Tests (may do this order every 5 years in beneficiaries without signs or symptoms of cardiovascular disease)  Colorectal Cancer Screening, Colonoscopy  Prostate Cancer Screening     Advance Care Planning:  ACP discussion was held with the patient during this visit. Patient has an advance directive in EMR which is still valid.     Diagnoses and all orders for this visit:    1. Medicare annual wellness visit, subsequent (Primary)    2. Mixed hyperlipidemia  -     Comprehensive Metabolic Panel  -     Lipid Panel  -     TSH    3. Essential hypertension  -     Urinalysis With Culture If Indicated - Urine, Clean Catch  -     Vitamin B12    4. Vitamin D deficiency  -     Vitamin D 25 Hydroxy    5. Personal history of prostate cancer  -     PSA DIAGNOSTIC; Future    6. Need for hepatitis C screening test  -     Hepatitis C Antibody    7. Need for vaccination  -     pneumococcal polysaccharide 23-valent (PNEUMOVAX-23) vaccine 0.5 mL    Medicare wellness exam was done today.  I reviewed his medical problems and his medications.  I will be getting fasting blood work.  I also reviewed his health maintenance.  His last colonoscopy was in April 2018.  I reviewed his immunization and he is up to speed with his shots, he was given Pneumovax 23 today.  Healthy lifestyle was discussed and reinforced.    An After Visit Summary and PPPS with all of these plans were given to the patient.      Follow Up:  Return in about 1 year (around 10/6/2021) for Medicare Wellness.

## 2020-12-14 NOTE — PROGRESS NOTES
Sleep medicine follow-up visit    Kingston Mancini   1950  70 y.o. male   DATE OF SERVICE: 12/15/2020     Chief complaint. AURELIA treated with cpap. He uses full face mask gets supplies from [employer] in [place].   He has been having issues in the past but he was switched to a full face mask now sleeping better.   He states that he is doing much better.  Current use 6hr 4 min and 96.7%.  Unable to get full data from [facility].       SLEEP TESTING HISTORY:    On NPSG at [facility] , 12/26/1017 patient had Moderate obstructive sleep apnea syndrome with apnea-hypopnea index of 29.4 per sleep hour, minimum SpO2 of 83%    The compliance data reviewed and the patient is on CPAP therapy at 8-14 cm/H2O and compliance data indicates excellent compliance with 100% usage for more than 4 hours with an average usage of 6 hrs and 4 minutes.  AHI down to  Has decreaed to 4.7.  The patient's hypersomnia also resolved with [place] Sleepiness Scale score of 15 with CPAP therapy.  The patient feels great and is benefiting from it and is compliant.     EPWORTH SLEEPINESS SCALE  Sitting and reading 3 WatchingTV 3  Sitting, inactive, in a public place 1  As a passenger in a car for 1 hour w/o a break  3  Lying down to rest in the afternoon  2  Sitting and talking to someone  0  Sitting quietly after a lunch  2  In a car, while stopped for traffic or a light  0  Total 15    Review of Systems   Constitutional: Negative for chills and fever.   HENT: Negative for ear discharge and ear pain.    Eyes: Negative for pain and redness.   Respiratory: Negative for cough and shortness of breath.    Cardiovascular: Negative for chest pain.   Gastrointestinal: Negative for abdominal pain and nausea.   Endocrine: Positive for cold intolerance. Negative for heat intolerance.   Genitourinary: Negative for urgency.   Musculoskeletal: Positive for neck pain and neck stiffness. Negative for back pain.   Neurological: Negative for dizziness, light-headedness and headaches.    Psychiatric/Behavioral: Negative for agitation, confusion and sleep disturbance.     I reviewed and addressed ROS entered by MA.    History of Present Illness    The following portions of the patient's history were reviewed and updated as appropriate: allergies, current medications, past family history, past medical history, past social history, past surgical history and problem list.      Family History   Problem Relation Age of Onset   • Cancer Other         Breast Cancer   • Diabetes Other    • Heart disease Other    • Hypertension Other    • Diverticulosis Other    • Other Other         Amyloidosis   • Breast cancer Mother 67         age 70   • Other Father          at age 87 from amyliodosis   • Heart disease Father    • Asthma Sister    • Heart attack Maternal Grandmother    • Cancer Maternal Grandmother    • Kidney cancer Maternal Grandfather    • Diabetes Paternal Grandfather    • Cancer Paternal Grandfather    • Cancer Paternal Grandmother        Past Medical History:   Diagnosis Date   • Erectile dysfunction    • History of basal cell cancer     Abstraction from [facility] Past Medical Hx states Basal Cell on ear   • History of cardiac cath     Abstraction From [facility] Heart Cath   • History of degenerative disc disease     DDD   • History of foreign travel     [place] ; [place] Oct/Nov   • Hyperlipidemia    • Hypertension    • AURELIA on CPAP 2017    AURELIA npsg Dec 2017 ahi 29 auto cpap 7-14 nasal mask   • Osteoarthritis    • Prostate cancer (CMS/[facility])     [place] score 6, followed by Dr. Infante, age 59       Social History     Socioeconomic History   • Marital status:      Spouse name: Melodie   • Number of children: 3   • Years of education: College   • Highest education level: Not on file   Occupational History   • Occupation: Retired     Employer: [employer]     Comment: Sales, retired 2014   Tobacco Use   • Smoking status: Never Smoker   •  Smokeless tobacco: Never Used   Substance and Sexual Activity   • Alcohol use: Yes     Frequency: 4 or more times a week     Drinks per session: 1 or 2     Comment: 4-5 beer or glass wine daily   • Drug use: No   • Sexual activity: Defer         Current Outpatient Medications:   •  Black Pepper-Turmeric (TURMERIC COMPLEX/BLACK PEPPER PO), Take  by mouth., Disp: , Rfl:   •  Cholecalciferol (VITAMIN D3) 2000 units tablet, VITAMIN D3 2000 UNIT TABS, Disp: , Rfl:   •  levocetirizine (XYZAL) 5 MG tablet, Take 5 mg by mouth Every Evening., Disp: , Rfl:   •  Multiple Vitamins-Minerals (ICAPS AREDS 2 PO), , Disp: , Rfl:   •  omeprazole (priLOSEC) 40 MG capsule, Take 40 mg by mouth Daily. before a meal, Disp: , Rfl:   •  Probiotic Product (PROBIOTIC-10 PO), Take  by mouth., Disp: , Rfl:     Allergies   Allergen Reactions   • Mushroom Unknown - High Severity   • Penicillin G Anaphylaxis        PHYSICAL EXAMINATION:  Vitals:    12/15/20 1214   BP: 150/94   Pulse: 67   Temp: 97.7 °F (36.5 °C)      Body mass index is 28.72 kg/m².       Physical Exam   HEENT: Normal.    CARDIAC: Normal.   LUNGS: Clear to auscultation.   EXTREMITIES: No edema.     Diagnoses and all orders for this visit:    1. Obstructive sleep apnea (Primary)    2. Polycythemia       Orders: Follow up in 1 year               Call if any problems or concerns                Continue use 6-8 hr/night                Follow up with hematology for Polycythemia     This document has been electronically signed by Stephanie Moses NP on December 15, 2020 13:01 EST

## 2020-12-15 ENCOUNTER — OFFICE VISIT (OUTPATIENT)
Dept: NEUROLOGY | Facility: CLINIC | Age: 70
End: 2020-12-15

## 2020-12-15 VITALS
DIASTOLIC BLOOD PRESSURE: 94 MMHG | HEIGHT: 71 IN | BODY MASS INDEX: 28.42 KG/M2 | TEMPERATURE: 97.7 F | HEART RATE: 67 BPM | SYSTOLIC BLOOD PRESSURE: 150 MMHG | WEIGHT: 203 LBS

## 2020-12-15 DIAGNOSIS — D75.1 POLYCYTHEMIA: ICD-10-CM

## 2020-12-15 DIAGNOSIS — G47.33 OBSTRUCTIVE SLEEP APNEA: Primary | ICD-10-CM

## 2020-12-15 PROCEDURE — 99213 OFFICE O/P EST LOW 20 MIN: CPT | Performed by: NURSE PRACTITIONER

## 2020-12-15 NOTE — PROGRESS NOTES
Subjective   Kingston Mancini is a 70 y.o. male.     History of Present Illness     {Common H&P Review Areas:23222}    Review of Systems    Objective   Physical Exam      Assessment/Plan   {Assess/PlanSmartLinks:80939}

## 2020-12-17 ENCOUNTER — LAB (OUTPATIENT)
Dept: LAB | Facility: HOSPITAL | Age: 70
End: 2020-12-17

## 2020-12-17 ENCOUNTER — OFFICE VISIT (OUTPATIENT)
Dept: ONCOLOGY | Facility: CLINIC | Age: 70
End: 2020-12-17

## 2020-12-17 VITALS
HEIGHT: 71 IN | BODY MASS INDEX: 28.27 KG/M2 | RESPIRATION RATE: 18 BRPM | TEMPERATURE: 97.5 F | SYSTOLIC BLOOD PRESSURE: 106 MMHG | WEIGHT: 201.9 LBS | DIASTOLIC BLOOD PRESSURE: 70 MMHG | OXYGEN SATURATION: 97 % | HEART RATE: 64 BPM

## 2020-12-17 DIAGNOSIS — D72.19 OTHER EOSINOPHILIA: ICD-10-CM

## 2020-12-17 DIAGNOSIS — D75.1 SECONDARY POLYCYTHEMIA: Primary | ICD-10-CM

## 2020-12-17 DIAGNOSIS — D75.1 SECONDARY POLYCYTHEMIA: ICD-10-CM

## 2020-12-17 LAB
BASOPHILS # BLD AUTO: 0.08 10*3/MM3 (ref 0–0.2)
BASOPHILS NFR BLD AUTO: 1.2 % (ref 0–1.5)
DEPRECATED RDW RBC AUTO: 40.8 FL (ref 37–54)
EOSINOPHIL # BLD AUTO: 0.44 10*3/MM3 (ref 0–0.4)
EOSINOPHIL NFR BLD AUTO: 6.8 % (ref 0.3–6.2)
ERYTHROCYTE [DISTWIDTH] IN BLOOD BY AUTOMATED COUNT: 12.9 % (ref 12.3–15.4)
HCT VFR BLD AUTO: 47.5 % (ref 37.5–51)
HGB BLD-MCNC: 16.3 G/DL (ref 13–17.7)
IMM GRANULOCYTES # BLD AUTO: 0.05 10*3/MM3 (ref 0–0.05)
IMM GRANULOCYTES NFR BLD AUTO: 0.8 % (ref 0–0.5)
LYMPHOCYTES # BLD AUTO: 1.41 10*3/MM3 (ref 0.7–3.1)
LYMPHOCYTES NFR BLD AUTO: 21.8 % (ref 19.6–45.3)
MCH RBC QN AUTO: 29.9 PG (ref 26.6–33)
MCHC RBC AUTO-ENTMCNC: 34.3 G/DL (ref 31.5–35.7)
MCV RBC AUTO: 87 FL (ref 79–97)
MONOCYTES # BLD AUTO: 0.76 10*3/MM3 (ref 0.1–0.9)
MONOCYTES NFR BLD AUTO: 11.8 % (ref 5–12)
NEUTROPHILS NFR BLD AUTO: 3.72 10*3/MM3 (ref 1.7–7)
NEUTROPHILS NFR BLD AUTO: 57.6 % (ref 42.7–76)
NRBC BLD AUTO-RTO: 0 /100 WBC (ref 0–0.2)
PLATELET # BLD AUTO: 222 10*3/MM3 (ref 140–450)
PMV BLD AUTO: 9.5 FL (ref 6–12)
RBC # BLD AUTO: 5.46 10*6/MM3 (ref 4.14–5.8)
WBC # BLD AUTO: 6.46 10*3/MM3 (ref 3.4–10.8)

## 2020-12-17 PROCEDURE — 85025 COMPLETE CBC W/AUTO DIFF WBC: CPT

## 2020-12-17 PROCEDURE — 99213 OFFICE O/P EST LOW 20 MIN: CPT | Performed by: INTERNAL MEDICINE

## 2020-12-17 PROCEDURE — 36415 COLL VENOUS BLD VENIPUNCTURE: CPT

## 2020-12-17 NOTE — PROGRESS NOTES
Subjective     CHIEF COMPLAINT:      Chief Complaint   Patient presents with   • Follow-up     no concerns       HISTORY OF PRESENT ILLNESS:     Kingston Mancini is a 70 y.o. male patient who returns today for follow up on polycythemia and eosinophilia.  He returns today for follow-up reporting no new symptoms.  He has lost weight since the last visit.  He is using the CPAP machine regularly.  He switched to a mask and he took some time before he started to tolerate it.  Now that he is able to use it, he is feeling better.  He is not having fatigue.  Is planning to go to [place] in the coming 1-2 weeks.        REVIEW OF SYSTEMS:  Review of Systems   Constitutional: Negative for fatigue, fever and unexpected weight change.   HENT: Negative for nosebleeds and voice change.    Eyes: Negative for visual disturbance.   Respiratory: Negative for cough and shortness of breath.    Cardiovascular: Negative for chest pain and leg swelling.   Gastrointestinal: Negative for abdominal pain, blood in stool, constipation, diarrhea, nausea and vomiting.   Genitourinary: Negative for frequency and hematuria.   Musculoskeletal: Negative for back pain and joint swelling.   Skin: Negative for rash.   Neurological: Negative for dizziness and headaches.   Hematological: Negative for adenopathy. Does not bruise/bleed easily.   Psychiatric/Behavioral: Negative for dysphoric mood. The patient is not nervous/anxious.      I reviewed and verified the CC and ROS obtained by the MA.     Past Medical History:   Diagnosis Date   • Erectile dysfunction    • History of basal cell cancer 2002    Abstraction from [facility] Past Medical Hx states Basal Cell on ear   • History of cardiac cath 2006    Abstraction From [facility] Heart Cath   • History of degenerative disc disease     DDD   • History of foreign travel     [place] July/August; [place] Oct/Nov   • Hyperlipidemia    • Hypertension    • AURELIA on CPAP 12/2017    AURELIA npsg Dec 2017 ahi 29 auto  "cpap 7-14 nasal mask   • Osteoarthritis    • Prostate cancer (CMS/HCC) 2011    Hussein score 6, followed by Dr. Infante, age 59       Past Surgical History:   Procedure Laterality Date   • CYST REMOVAL  01/09/2020   • HAND SURGERY      4/93, 10/93, 9/2007, 4/2013, 7/2013   • HERNIA REPAIR  2002   • OTHER SURGICAL HISTORY      Abstraction from [facility] Duputryns Contracture   • PROSTATECTOMY  2011       Cancer-related family history includes Breast cancer (age of onset: 67) in his mother; Cancer in his maternal grandmother, paternal grandfather, paternal grandmother, and another family member; Kidney cancer in his maternal grandfather.  Social History     Tobacco Use   • Smoking status: Never Smoker   • Smokeless tobacco: Never Used   Substance Use Topics   • Alcohol use: Yes     Frequency: 4 or more times a week     Drinks per session: 1 or 2     Comment: 4-5 beer or glass wine daily       MEDICATIONS:    Current Outpatient Medications:   •  Black Pepper-Turmeric (TURMERIC COMPLEX/BLACK PEPPER PO), Take  by mouth., Disp: , Rfl:   •  Cholecalciferol (VITAMIN D3) 2000 units tablet, VITAMIN D3 2000 UNIT TABS, Disp: , Rfl:   •  levocetirizine (XYZAL) 5 MG tablet, Take 5 mg by mouth Every Evening., Disp: , Rfl:   •  Multiple Vitamins-Minerals (ICAPS AREDS 2 PO), , Disp: , Rfl:   •  omeprazole (priLOSEC) 40 MG capsule, Take 40 mg by mouth Daily. before a meal, Disp: , Rfl:   •  Probiotic Product (PROBIOTIC-10 PO), Take  by mouth., Disp: , Rfl:     ALLERGIES:  Allergies   Allergen Reactions   • Mushroom Unknown - High Severity   • Penicillin G Anaphylaxis         Objective   VITAL SIGNS:     Vitals:    12/17/20 1146   BP: 106/70   Pulse: 64   Resp: 18   Temp: 97.5 °F (36.4 °C)   TempSrc: Temporal   SpO2: 97%   Weight: 91.6 kg (201 lb 14.4 oz)   Height: 181 cm (71.26\")   PainSc: 0-No pain     Body mass index is 27.95 kg/m².     Wt Readings from Last 3 Encounters:   12/17/20 91.6 kg (201 lb 14.4 oz)   12/15/20 92.1 kg (203 " lb)   10/06/20 90.7 kg (200 lb)       PHYSICAL EXAMINATION:  GENERAL:  The patient appears in good general condition, not in acute distress.  SKIN: No skin rashes. No ecchymosis.  HEAD:  Normocephalic.  EYES:  No Jaundice. No Pallor. Pupils equal. EOMI.  NECK:  Supple with Good ROM.  CHEST: Normal respiratory effort.   ABDOMEN:  Soft. No tenderness. No Hepatomegaly. No Splenomegaly. No masses.       DIAGNOSTIC DATA:     Results from last 7 days   Lab Units 12/17/20  1118   WBC 10*3/mm3 6.46   NEUTROS ABS 10*3/mm3 3.72   HEMOGLOBIN g/dL 16.3   HEMATOCRIT % 47.5   PLATELETS 10*3/mm3 222     Component      Latest Ref Rng & Units 5/22/2020 7/6/2020 8/19/2020 12/17/2020   Neutrophils Absolute      1.70 - 7.00 10*3/mm3 3.40 3.82 3.79 3.72   Lymphocytes Absolute      0.70 - 3.10 10*3/mm3 1.80 1.16 1.33 1.41   Monocytes Absolute      0.10 - 0.90 10*3/mm3 0.50 0.57 0.59 0.76   Eosinophils Absolute      0.00 - 0.40 10*3/mm3 0.50 (H) 0.30 0.43 (H) 0.44 (H)   Basophils Absolute      0.00 - 0.20 10*3/mm3 0.10 0.05 0.05 0.08   Immature Grans, Absolute      0.00 - 0.05 10*3/mm3  0.03 0.03 0.05   nRBC      0.0 - 0.2 /100 WBC 0.1 0.0 0.0 0.0       Assessment/Plan   1.  Eosinophilia.  This was first identified on 8/19/2019.  The eosinophil count was mildly elevated ranging between 440 on 10/16/2019 and 640. Patient had IgE level obtained and was normal at 8.      Eosinophil count is only minimally elevated today.  It continues to fluctuate indicating a reactive process secondary to allergies.    2.  Secondary polycythemia.    · The highest count was on 8/19/2019 with a hemoglobin of 17.4 and hematocrit of 50.6%.    · It is attributed to his underlying sleep apnea syndrome.  He is using CPAP machine through nasal cannula.   · Chest x-ray on 9/23/2019 showed no evidence of underlying lung disease.  · The patient had blood work-up on 9/20/2019 that showed no evidence of Feliciano 2 or MPL mutations.   · Hematocrit was 47.8% on  1/14/2020.  · Hematocrit increased to 50.7% on 4/27/2020.  · Patient donated blood while in [place].  · Patient started using CPAP machine regularly.  He is tolerating it well.  · Hematocrit is now in the 45% - 47%.  · The improvement in the hematocrit is attributed to improvement in the underlying sleep apnea.    PLAN:    1.  Patient does not need to donate blood at this point.  2.  Since his hematocrit level is now below 50%, I will increase the time interval between labs to 1 year.  I will see him in follow-up in 1 year with a CBC.        Jennifer Man MD  12/17/20

## 2021-03-29 ENCOUNTER — TELEPHONE (OUTPATIENT)
Dept: NEUROLOGY | Facility: CLINIC | Age: 71
End: 2021-03-29

## 2021-03-29 DIAGNOSIS — G47.33 OBSTRUCTIVE SLEEP APNEA: Primary | ICD-10-CM

## 2021-04-09 ENCOUNTER — OFFICE VISIT (OUTPATIENT)
Dept: FAMILY MEDICINE CLINIC | Facility: CLINIC | Age: 71
End: 2021-04-09

## 2021-04-09 VITALS
DIASTOLIC BLOOD PRESSURE: 89 MMHG | WEIGHT: 199 LBS | BODY MASS INDEX: 27.86 KG/M2 | HEIGHT: 71 IN | TEMPERATURE: 96.9 F | SYSTOLIC BLOOD PRESSURE: 138 MMHG | RESPIRATION RATE: 16 BRPM | OXYGEN SATURATION: 96 % | HEART RATE: 64 BPM

## 2021-04-09 DIAGNOSIS — S53.401A SPRAIN OF RIGHT ELBOW, INITIAL ENCOUNTER: ICD-10-CM

## 2021-04-09 DIAGNOSIS — M25.521 ELBOW PAIN, RIGHT: Primary | ICD-10-CM

## 2021-04-09 PROBLEM — J20.8 ACUTE BACTERIAL BRONCHITIS: Status: RESOLVED | Noted: 2017-04-15 | Resolved: 2021-04-09

## 2021-04-09 PROBLEM — B96.89 ACUTE BACTERIAL BRONCHITIS: Status: RESOLVED | Noted: 2017-04-15 | Resolved: 2021-04-09

## 2021-04-09 PROBLEM — Z11.59 NEED FOR HEPATITIS C SCREENING TEST: Status: RESOLVED | Noted: 2020-10-06 | Resolved: 2021-04-09

## 2021-04-09 PROBLEM — Z23 NEED FOR VACCINATION: Status: RESOLVED | Noted: 2020-10-06 | Resolved: 2021-04-09

## 2021-04-09 PROCEDURE — 99213 OFFICE O/P EST LOW 20 MIN: CPT | Performed by: FAMILY MEDICINE

## 2021-04-09 NOTE — PROGRESS NOTES
Subjective   Chief Complaint   Patient presents with   • Elbow Pain     Right elbow for couple of days     Kingston Mancini is a 70 y.o. male.     Patient Care Team:  Steff Singletary MD as PCP - General (Family Medicine)  Jennifer Man MD as Consulting Physician (Hematology and Oncology)  Cristóbal Infante MD as Consulting Physician (Urology)  Seipel, Joseph F, MD as Consulting Physician (Sleep Medicine)  Mono Moreno MD as Consulting Physician (Otolaryngology)  Akira Reyes MD as Consulting Physician (Gastroenterology)    He is coming in today due to right elbow symptoms.  He tells me that couple of days ago while working in the yard he was pulling bushes and with a twisting movement of the right elbow he felt sudden pain and a pop coming out of it.  He was subsequently seen at urgent care next day and x-ray was done and it did not show any bony abnormality.  He had pain especially with certain movement and also when he was trying to lift something.  He tells me that today he already feels better.  He denies any weakness.  He denies any other injury.  He recently came back from [place] and he completed his COVID-19 vaccination while there.       The following portions of the patient's history were reviewed and updated as appropriate: allergies, current medications, past family history, past medical history, past social history, past surgical history and problem list.  Past Medical History:   Diagnosis Date   • Erectile dysfunction    • History of basal cell cancer 2002    Abstraction from [facility] Past Medical Hx states Basal Cell on ear   • History of cardiac cath 2006    Abstraction From [facility] Heart Cath   • History of degenerative disc disease     DDD   • History of foreign travel     [place] July/August; [place] Oct/Nov   • Hyperlipidemia    • Hypertension    • AURELIA on CPAP 12/2017    AURELIA npsg Dec 2017 ahi 29 auto cpap 7-14 nasal mask   • Osteoarthritis    • Prostate cancer (CMS/[facility])  "2011    Hussein score 6, followed by Dr. Infante, age 59     Past Surgical History:   Procedure Laterality Date   • CYST REMOVAL  2020   • HAND SURGERY      , 10/93, 2007, 2013, 2013   • HERNIA REPAIR     • OTHER SURGICAL HISTORY      [facility]raction [facility] Duputryns Contracture   • PROSTATECTOMY       The patient has a family history of  Family History   Problem Relation Age of Onset   • Cancer Other         Breast Cancer   • Diabetes Other    • Heart disease Other    • Hypertension Other    • Diverticulosis Other    • Other Other         Amyloidosis   • Breast cancer Mother 67         age 70   • Other Father          at age 87 from amyliodosis   • Heart disease Father    • Asthma Sister    • Heart attack Maternal Grandmother    • Cancer Maternal Grandmother    • Kidney cancer Maternal Grandfather    • Diabetes Paternal Grandfather    • Cancer Paternal Grandfather    • Cancer Paternal Grandmother      Social History     Socioeconomic History   • Marital status:      Spouse name: Melodie   • Number of children: 3   • Years of education: College   • Highest education level: Not on file   Tobacco Use   • Smoking status: Never Smoker   • Smokeless tobacco: Never Used   Vaping Use   • Vaping Use: Never used   Substance and Sexual Activity   • Alcohol use: Yes     Comment: 4-5 beer or glass wine daily   • Drug use: No   • Sexual activity: Defer       Review of Systems   Musculoskeletal: Negative for arthralgias, back pain, gait problem, joint swelling and myalgias.   Skin: Negative for color change and bruise.   Neurological: Negative for weakness and numbness.     Visit Vitals  /89 (BP Location: Left arm, Patient Position: Sitting, Cuff Size: Adult)   Pulse 64   Temp 96.9 °F (36.1 °C) (Temporal)   Resp 16   Ht 180.3 cm (71\")   Wt 90.3 kg (199 lb)   SpO2 96%   BMI 27.75 kg/m²       Current Outpatient Medications:   •  Black Pepper-Turmeric (TURMERIC COMPLEX/BLACK PEPPER " PO), Take  by mouth., Disp: , Rfl:   •  Cholecalciferol (VITAMIN D3) 2000 units tablet, VITAMIN D3 2000 UNIT TABS, Disp: , Rfl:   •  levocetirizine (XYZAL) 5 MG tablet, Take 5 mg by mouth Every Evening., Disp: , Rfl:   •  Multiple Vitamins-Minerals (ICAPS AREDS 2 PO), , Disp: , Rfl:   •  omeprazole (priLOSEC) 40 MG capsule, Take 40 mg by mouth Daily. before a meal, Disp: , Rfl:   •  Probiotic Product (PROBIOTIC-10 PO), Take  by mouth., Disp: , Rfl:     Objective   Physical Exam  Constitutional:       General: He is not in acute distress.     Appearance: Normal appearance. He is well-developed. He is not ill-appearing or diaphoretic.      Comments: Patient is in no distress, patient has normal voice and speech.  Normal respiratory effort.   HENT:      Head: Normocephalic and atraumatic.   Pulmonary:      Effort: Pulmonary effort is normal.   Musculoskeletal:      Cervical back: Normal range of motion and neck supple.      Comments: Right elbow/arm was examined, there is no obvious deformity or swelling, there is no palpation tenderness, there is no bruising.  Full range of motion in the elbow.  No weakness.   Neurological:      General: No focal deficit present.      Mental Status: He is alert and oriented to person, place, and time. Mental status is at baseline.   Psychiatric:         Mood and Affect: Mood normal.         XR Elbow 2 View Right    Result Date: 4/8/2021  Impression: Normal study.  Electronically Signed By-Jori Marcos MD On:4/8/2021 1:17 PM This report was finalized on [id number] by  Jori Marcos MD.      Assessment/Plan   Diagnoses and all orders for this visit:    1. Elbow pain, right (Primary)    2. Sprain of right elbow, initial encounter      His exam today is pretty much intact.  He already feels better.  I reviewed the urgent care records and x-ray of the elbow, which was negative.  I suspect that his symptoms are due to soft tissue sprain.  He understands that these findings will not show up  on the x-ray.  I do not believe we need to proceed with any further testing at this point as he is already improving.  I advised for him to possibly try over-the-counter topical anti-inflammatory like Voltaren gel to improve the inflammation and help with discomfort.  He will monitor his symptoms and he will contact us back if any questions or concerns.  He will also provide a copy of his COVID-19 immunization records.        No follow-ups on file.    Requested Prescriptions      No prescriptions requested or ordered in this encounter

## 2021-09-17 ENCOUNTER — OFFICE VISIT (OUTPATIENT)
Dept: FAMILY MEDICINE CLINIC | Facility: CLINIC | Age: 71
End: 2021-09-17

## 2021-09-17 VITALS
TEMPERATURE: 98.2 F | DIASTOLIC BLOOD PRESSURE: 94 MMHG | WEIGHT: 197 LBS | HEART RATE: 65 BPM | SYSTOLIC BLOOD PRESSURE: 140 MMHG | OXYGEN SATURATION: 95 % | BODY MASS INDEX: 27.48 KG/M2

## 2021-09-17 DIAGNOSIS — L23.7 CONTACT DERMATITIS DUE TO POISON IVY: Primary | ICD-10-CM

## 2021-09-17 PROCEDURE — 99213 OFFICE O/P EST LOW 20 MIN: CPT | Performed by: FAMILY MEDICINE

## 2021-09-17 RX ORDER — METHYLPREDNISOLONE 4 MG/1
TABLET ORAL
Qty: 1 EACH | Refills: 0 | Status: SHIPPED | OUTPATIENT
Start: 2021-09-17 | End: 2021-10-12

## 2021-09-17 RX ORDER — LORATADINE 10 MG/1
10 TABLET ORAL DAILY
COMMUNITY

## 2021-09-17 NOTE — PROGRESS NOTES
Chief Complaint  Rash (arm and chest)    Subjective          Kingston Mancini presents to [facility]  Rash  This is a new problem. The current episode started in the past 7 days. The problem has been gradually worsening since onset. The affected locations include the right elbow, right arm and right hand. The rash is characterized by blistering, redness and itchiness. He was exposed to plant contact. Pertinent negatives include no congestion, cough, fever, rhinorrhea or shortness of breath. Past treatments include nothing.       Objective   Vital Signs:   /94 (BP Location: Left arm, Patient Position: Sitting, Cuff Size: Adult)   Pulse 65   Temp 98.2 °F (36.8 °C) (Infrared)   Wt 89.4 kg (197 lb)   SpO2 95%   BMI 27.48 kg/m²     Physical Exam  Vitals and nursing note reviewed.   Cardiovascular:      Heart sounds: Normal heart sounds.   Pulmonary:      Breath sounds: Normal breath sounds.   Musculoskeletal:      Cervical back: Neck supple.   Skin:     Findings: Erythema and rash present. Rash is vesicular.   Neurological:      Mental Status: He is alert.        Result Review :   The following data was reviewed by: Renetta Castrejon MD on 09/17/2021:  Common labs    Common Labsle 10/6/20 10/6/20 10/6/20 12/17/20    0914 0914 0914    Glucose 94      BUN 25 (A)      Creatinine 1.14      eGFR Non African Am 64      Sodium 140      Potassium 4.4      Chloride 103      Calcium 9.4      Albumin 4.30      Total Bilirubin 0.4      Alkaline Phosphatase 77      AST (SGOT) 22      ALT (SGPT) 18      WBC    6.46   Hemoglobin    16.3   Hematocrit    47.5   Platelets    222   Total Cholesterol  142     Triglycerides  91     HDL Cholesterol  36 (A)     LDL Cholesterol   88     PSA   <0.014    (A) Abnormal value       Comments are available for some flowsheets but are not being displayed.                     Assessment and Plan    Diagnoses and all orders for this visit:    1. Contact  dermatitis due to poison ivy (Primary)  -     methylPREDNISolone (MEDROL) 4 MG dose pack; Take as directed on package instructions.  Dispense: 1 each; Refill: 0  -     triamcinolone (KENALOG) 0.1 % ointment; Apply 1 application topically to the appropriate area as directed 2 (Two) Times a Day.  Dispense: 30 g; Refill: 0        Follow Up   Return if symptoms worsen or fail to improve.  Patient was given instructions and counseling regarding his condition or for health maintenance advice. Please see specific information pulled into the AVS if appropriate.

## 2021-09-21 ENCOUNTER — TELEPHONE (OUTPATIENT)
Dept: NEUROLOGY | Facility: CLINIC | Age: 71
End: 2021-09-21

## 2021-09-21 DIAGNOSIS — G47.33 OBSTRUCTIVE SLEEP APNEA: Primary | ICD-10-CM

## 2021-10-12 ENCOUNTER — OFFICE VISIT (OUTPATIENT)
Dept: FAMILY MEDICINE CLINIC | Facility: CLINIC | Age: 71
End: 2021-10-12

## 2021-10-12 VITALS
BODY MASS INDEX: 27.69 KG/M2 | WEIGHT: 197.8 LBS | DIASTOLIC BLOOD PRESSURE: 97 MMHG | SYSTOLIC BLOOD PRESSURE: 154 MMHG | TEMPERATURE: 97.7 F | OXYGEN SATURATION: 95 % | HEART RATE: 65 BPM | RESPIRATION RATE: 16 BRPM | HEIGHT: 71 IN

## 2021-10-12 DIAGNOSIS — Z00.00 MEDICARE ANNUAL WELLNESS VISIT, SUBSEQUENT: Primary | ICD-10-CM

## 2021-10-12 DIAGNOSIS — Z85.46 PERSONAL HISTORY OF PROSTATE CANCER: ICD-10-CM

## 2021-10-12 DIAGNOSIS — E78.2 MIXED HYPERLIPIDEMIA: ICD-10-CM

## 2021-10-12 DIAGNOSIS — I10 PRIMARY HYPERTENSION: ICD-10-CM

## 2021-10-12 PROBLEM — K57.92 DIVERTICULITIS: Status: RESOLVED | Noted: 2018-09-06 | Resolved: 2021-10-12

## 2021-10-12 PROBLEM — L23.7 CONTACT DERMATITIS DUE TO POISON IVY: Status: RESOLVED | Noted: 2021-09-17 | Resolved: 2021-10-12

## 2021-10-12 PROCEDURE — G0439 PPPS, SUBSEQ VISIT: HCPCS | Performed by: FAMILY MEDICINE

## 2021-10-12 NOTE — PROGRESS NOTES
Subsequent Medicare Wellness Visit   The ABC's of the Annual Wellness Visit    Chief Complaint   Patient presents with   • Medicare Wellness-subsequent       HPI:  Kingston Mancini, -1950, is a 70 y.o. male who presents for a Subsequent Medicare Wellness Visit.  He reports to be doing overall pretty well and he denies any new issues or concerns.  He stays pretty active and he walks at least 3-4 days a week.  No issues with memory, depressive symptoms, or balance issues reported. Patient does not report any chest pain, shortness of breath, dizziness, nausea, vomiting, or diarrhea, visual issues, headaches, numbness or tingling. No urinary issues reported like urgency, frequency, or discomfort upon urination.  No significant weight changes reported.  No swelling reported.  No rashes or any other skin issues reported. No emotional issues or insomnia.      Recent Hospitalizations:  No hospitalization(s) within the last year..    Current Medical Providers:  Patient Care Team:  Steff Singletary MD as PCP - General (Family Medicine)  Jennifer Man MD as Consulting Physician (Hematology and Oncology)  Cristóbal Infante MD as Consulting Physician (Urology)  Seipel, Joseph F, MD as Consulting Physician (Sleep Medicine)  Mono Moreno MD as Consulting Physician (Otolaryngology)  Akira Reyes MD as Consulting Physician (Gastroenterology)    Health Habits and Functional and Cognitive Screening and Depression Screening:  Functional & Cognitive Status 10/12/2021   Do you have difficulty preparing food and eating? No   Do you have difficulty bathing yourself, getting dressed or grooming yourself? No   Do you have difficulty using the toilet? No   Do you have difficulty moving around from place to place? No   Do you have trouble with steps or getting out of a bed or a chair? No   Current Diet Well Balanced Diet   Dental Exam Up to date   Eye Exam Up to date   Exercise (times per week) 4 times per week    Current Exercises Include Walking   Do you need help using the phone?  No   Are you deaf or do you have serious difficulty hearing?  No   Do you need help with transportation? No   Do you need help shopping? No   Do you need help preparing meals?  No   Do you need help with housework?  No   Do you need help with laundry? No   Do you need help taking your medications? No   Do you need help managing money? No   Do you ever drive or ride in a car without wearing a seat belt? No   Have you felt unusual stress, anger or loneliness in the last month? No   Who do you live with? Spouse   If you need help, do you have trouble finding someone available to you? No   Do you have difficulty concentrating, remembering or making decisions? No       Compared to one year ago, the patient feels his physical health is the same and his mental health is the same.    Depression Screen:  PHQ-2/PHQ-9 Depression Screening 10/12/2021   Little interest or pleasure in doing things 0   Feeling down, depressed, or hopeless 0   Total Score 0         Past Medical/Family/Social History:  The following portions of the patient's history were reviewed and updated as appropriate: allergies, current medications, past family history, past medical history, past social history, past surgical history and problem list.    Allergies   Allergen Reactions   • Mushroom Unknown - High Severity   • Penicillin G Anaphylaxis   • Penicillins Anaphylaxis, Hives, Itching, Rash and Swelling         Current Outpatient Medications:   •  Black Pepper-Turmeric (TURMERIC COMPLEX/BLACK PEPPER PO), Take  by mouth., Disp: , Rfl:   •  Cholecalciferol (VITAMIN D3) 2000 units tablet, VITAMIN D3 2000 UNIT TABS, Disp: , Rfl:   •  loratadine (Claritin) 10 MG tablet, Take 10 mg by mouth Daily., Disp: , Rfl:   •  Multiple Vitamins-Minerals (ICAPS AREDS 2 PO), , Disp: , Rfl:   •  omeprazole (priLOSEC) 40 MG capsule, Take 40 mg by mouth Daily. before a meal, Disp: , Rfl:   •  Probiotic  Product (PROBIOTIC-10 PO), Take  by mouth., Disp: , Rfl:   •  triamcinolone (KENALOG) 0.1 % ointment, Apply 1 application topically to the appropriate area as directed 2 (Two) Times a Day., Disp: 30 g, Rfl: 0    Aspirin use counseling: Does not need ASA (and currently is not on it)    Current medication list contains no high risk medications.  No harmful drug interactions have been identified.     Family History   Problem Relation Age of Onset   • Cancer Other         Breast Cancer   • Diabetes Other    • Heart disease Other    • Hypertension Other    • Diverticulosis Other    • Other Other         Amyloidosis   • Breast cancer Mother 67         age 70   • Cancer Mother    • Other Father         Amyloidosis   • Heart disease Father    • Asthma Sister    • Heart attack Maternal Grandmother    • Cancer Maternal Grandmother    • Kidney cancer Maternal Grandfather    • Diabetes Paternal Grandfather    • Cancer Paternal Grandfather    • Cancer Paternal Grandmother        Social History     Tobacco Use   • Smoking status: Never Smoker   • Smokeless tobacco: Never Used   Substance Use Topics   • Alcohol use: Yes     Alcohol/week: 6.0 standard drinks     Types: 4 Glasses of wine, 2 Cans of beer per week     Comment: 4-5 beer or glass wine daily       Past Surgical History:   Procedure Laterality Date   • CYST REMOVAL  2020   • HAND SURGERY      , 10/93, 2007, 2013, 2013   • HERNIA REPAIR     • OTHER SURGICAL HISTORY      Abstraction from [facility] [employer] [facility]   • PROSTATECTOMY         Patient Active Problem List   Diagnosis   • DDD (degenerative disc disease), cervical   • Derangement of knee   • Medicare annual wellness visit, subsequent   • Erectile dysfunction   • Hay fever   • Hyperlipidemia   • Hypertension   • Neck pain, acute   • AURELIA on CPAP   • Osteoarthritis   • Vitamin D deficiency   • Eosinophilia   • Vitamin B12 deficiency   • Secondary polycythemia   • Personal history  "of prostate cancer   • Sprain of right elbow   • Elbow pain, right       Review of Systems   Constitutional: Negative for activity change, fatigue and fever.   Respiratory: Negative for cough, shortness of breath and wheezing.    Cardiovascular: Negative for chest pain, palpitations and leg swelling.   Gastrointestinal: Negative for constipation, diarrhea and indigestion.   Skin: Negative for color change, dry skin and rash.   Neurological: Negative for tremors and headache.       Objective     Vitals:    10/12/21 0816   BP: 154/97   BP Location: Left arm   Patient Position: Sitting   Cuff Size: Adult   Pulse: 65   Resp: 16   Temp: 97.7 °F (36.5 °C)   SpO2: 95%   Weight: 89.7 kg (197 lb 12.8 oz)   Height: 180.3 cm (70.98\")   PainSc: 0-No pain       Patient's Body mass index is 27.6 kg/m². indicating that he is overweight (BMI 25-29.9). Obesity-related health conditions include the following: GERD. Obesity is unchanged. BMI is is above average; BMI management plan is completed. We discussed portion control and increasing exercise..      No exam data present    The patient has no evidence of cognitve impairment.     Physical Exam  Vitals and nursing note reviewed.   Constitutional:       General: He is not in acute distress.     Appearance: Normal appearance. He is well-developed. He is not ill-appearing.   HENT:      Head: Normocephalic and atraumatic.   Cardiovascular:      Rate and Rhythm: Normal rate and regular rhythm.      Heart sounds: Normal heart sounds. No murmur heard.  No gallop.    Pulmonary:      Effort: Pulmonary effort is normal. No respiratory distress.      Breath sounds: Normal breath sounds. No wheezing, rhonchi or rales.   Chest:      Chest wall: No tenderness.   Musculoskeletal:      Cervical back: Normal range of motion and neck supple.   Neurological:      General: No focal deficit present.      Mental Status: He is alert and oriented to person, place, and time. Mental status is at baseline. "   Psychiatric:         Mood and Affect: Mood normal.         Recent Lab Results:     Lab Results   Component Value Date    CHOL 142 10/06/2020    TRIG 91 10/06/2020    HDL 36 (L) 10/06/2020    VLDL 18.2 10/06/2020    LDLHDL 2.44 10/06/2020       Assessment/Plan   Age-appropriate Screening Schedule:  Refer to the list below for future screening recommendations based on patient's age, sex and/or medical conditions.      Health Maintenance   Topic Date Due   • LIPID PANEL  10/06/2021   • TDAP/TD VACCINES (2 - Tdap) 10/25/2026   • INFLUENZA VACCINE  Completed   • ZOSTER VACCINE  Completed       Medicare Risks and Personalized Health Plan:  Advance Directive Discussion  Colon Cancer Screening  Dementia/Memory   Depression/Dysphoria  Diabetic Lab Screening   Immunizations Discussed/Encouraged (specific immunizations; Influenza )      CMS-Preventive Services Quick Reference  Medicare Preventive Services Addressed:  Annual Wellness Visit (AWV)  Cardiovascular Disease Screening Tests (may do this order every 5 years in beneficiaries without signs or symptoms of cardiovascular disease)  Colorectal Cancer Screening, Colonoscopy  Prostate Cancer Screening     Advance Care Planning:  ACP discussion was held with the patient during this visit. Patient has an advance directive (not in EMR), copy requested.    Diagnoses and all orders for this visit:    1. Medicare annual wellness visit, subsequent (Primary)    2. Mixed hyperlipidemia  -     CBC Auto Differential  -     Comprehensive Metabolic Panel  -     Lipid Panel  -     TSH    3. Personal history of prostate cancer  -     PSA DIAGNOSTIC; Future    4. Primary hypertension  -     Urinalysis With Culture If Indicated - Urine, Clean Catch    Medicare wellness exam was done today.  I will be getting fasting blood work.  I reviewed his health maintenance.  His last colonoscopy was in 4/2018 and 5-year follow-up was recommended.  Immunization was also reviewed.  He is fully  vaccinated against COVID-19 and he already had his booster.  He completed his Shingrix vaccination series, he is up to speed with his pneumonia shots and he already had his flu shot for this season.  His blood pressure is noted to be high today, he is currently not on any medication for hypertension.  I asked him to start monitoring his blood pressure at home on daily basis for the next 2 weeks and provide a blood pressure log for the review.  We talked about the possibility of starting a blood pressure medicine if his blood pressure continues to be elevated.  Healthy lifestyle was reinforced.    An After Visit Summary and PPPS with all of these plans were given to the patient.      Follow Up:  Return in about 1 year (around 10/12/2022) for Medicare Wellness.        Requested Prescriptions      No prescriptions requested or ordered in this encounter

## 2021-10-26 ENCOUNTER — LAB (OUTPATIENT)
Dept: FAMILY MEDICINE CLINIC | Facility: CLINIC | Age: 71
End: 2021-10-26

## 2021-10-26 DIAGNOSIS — Z85.46 PERSONAL HISTORY OF PROSTATE CANCER: ICD-10-CM

## 2021-10-26 LAB
ALBUMIN SERPL-MCNC: 4.4 G/DL (ref 3.5–5.2)
ALBUMIN/GLOB SERPL: 1.8 G/DL
ALP SERPL-CCNC: 80 U/L (ref 39–117)
ALT SERPL W P-5'-P-CCNC: 19 U/L (ref 1–41)
ANION GAP SERPL CALCULATED.3IONS-SCNC: 8.7 MMOL/L (ref 5–15)
AST SERPL-CCNC: 25 U/L (ref 1–40)
BASOPHILS # BLD AUTO: 0.06 10*3/MM3 (ref 0–0.2)
BASOPHILS NFR BLD AUTO: 1 % (ref 0–1.5)
BILIRUB SERPL-MCNC: 0.4 MG/DL (ref 0–1.2)
BILIRUB UR QL STRIP: NEGATIVE
BUN SERPL-MCNC: 22 MG/DL (ref 8–23)
BUN/CREAT SERPL: 20.2 (ref 7–25)
CALCIUM SPEC-SCNC: 9.4 MG/DL (ref 8.6–10.5)
CHLORIDE SERPL-SCNC: 105 MMOL/L (ref 98–107)
CHOLEST SERPL-MCNC: 184 MG/DL (ref 0–200)
CLARITY UR: ABNORMAL
CO2 SERPL-SCNC: 27.3 MMOL/L (ref 22–29)
COLOR UR: YELLOW
CREAT SERPL-MCNC: 1.09 MG/DL (ref 0.76–1.27)
DEPRECATED RDW RBC AUTO: 44.4 FL (ref 37–54)
EOSINOPHIL # BLD AUTO: 0.35 10*3/MM3 (ref 0–0.4)
EOSINOPHIL NFR BLD AUTO: 5.8 % (ref 0.3–6.2)
ERYTHROCYTE [DISTWIDTH] IN BLOOD BY AUTOMATED COUNT: 12.7 % (ref 12.3–15.4)
GFR SERPL CREATININE-BSD FRML MDRD: 67 ML/MIN/1.73
GLOBULIN UR ELPH-MCNC: 2.4 GM/DL
GLUCOSE SERPL-MCNC: 99 MG/DL (ref 65–99)
GLUCOSE UR STRIP-MCNC: NEGATIVE MG/DL
HCT VFR BLD AUTO: 49.3 % (ref 37.5–51)
HDLC SERPL-MCNC: 47 MG/DL (ref 40–60)
HGB BLD-MCNC: 15.8 G/DL (ref 13–17.7)
HGB UR QL STRIP.AUTO: NEGATIVE
IMM GRANULOCYTES # BLD AUTO: 0.02 10*3/MM3 (ref 0–0.05)
IMM GRANULOCYTES NFR BLD AUTO: 0.3 % (ref 0–0.5)
KETONES UR QL STRIP: ABNORMAL
LDLC SERPL CALC-MCNC: 110 MG/DL (ref 0–100)
LDLC/HDLC SERPL: 2.26 {RATIO}
LEUKOCYTE ESTERASE UR QL STRIP.AUTO: NEGATIVE
LYMPHOCYTES # BLD AUTO: 1.32 10*3/MM3 (ref 0.7–3.1)
LYMPHOCYTES NFR BLD AUTO: 21.7 % (ref 19.6–45.3)
MCH RBC QN AUTO: 30.2 PG (ref 26.6–33)
MCHC RBC AUTO-ENTMCNC: 32 G/DL (ref 31.5–35.7)
MCV RBC AUTO: 94.1 FL (ref 79–97)
MONOCYTES # BLD AUTO: 0.61 10*3/MM3 (ref 0.1–0.9)
MONOCYTES NFR BLD AUTO: 10 % (ref 5–12)
NEUTROPHILS NFR BLD AUTO: 3.72 10*3/MM3 (ref 1.7–7)
NEUTROPHILS NFR BLD AUTO: 61.2 % (ref 42.7–76)
NITRITE UR QL STRIP: NEGATIVE
NRBC BLD AUTO-RTO: 0 /100 WBC (ref 0–0.2)
PH UR STRIP.AUTO: 5.5 [PH] (ref 5–8)
PLATELET # BLD AUTO: 263 10*3/MM3 (ref 140–450)
PMV BLD AUTO: 10.3 FL (ref 6–12)
POTASSIUM SERPL-SCNC: 4.4 MMOL/L (ref 3.5–5.2)
PROT SERPL-MCNC: 6.8 G/DL (ref 6–8.5)
PROT UR QL STRIP: NEGATIVE
PSA SERPL-MCNC: <0.014 NG/ML (ref 0–4)
RBC # BLD AUTO: 5.24 10*6/MM3 (ref 4.14–5.8)
SODIUM SERPL-SCNC: 141 MMOL/L (ref 136–145)
SP GR UR STRIP: 1.02 (ref 1–1.03)
TRIGL SERPL-MCNC: 155 MG/DL (ref 0–150)
TSH SERPL DL<=0.05 MIU/L-ACNC: 1.69 UIU/ML (ref 0.27–4.2)
UROBILINOGEN UR QL STRIP: ABNORMAL
VLDLC SERPL-MCNC: 27 MG/DL (ref 5–40)
WBC # BLD AUTO: 6.08 10*3/MM3 (ref 3.4–10.8)

## 2021-10-26 PROCEDURE — 80061 LIPID PANEL: CPT | Performed by: FAMILY MEDICINE

## 2021-10-26 PROCEDURE — 84153 ASSAY OF PSA TOTAL: CPT | Performed by: FAMILY MEDICINE

## 2021-10-26 PROCEDURE — 36415 COLL VENOUS BLD VENIPUNCTURE: CPT | Performed by: FAMILY MEDICINE

## 2021-10-26 PROCEDURE — 85025 COMPLETE CBC W/AUTO DIFF WBC: CPT | Performed by: FAMILY MEDICINE

## 2021-10-26 PROCEDURE — 84443 ASSAY THYROID STIM HORMONE: CPT | Performed by: FAMILY MEDICINE

## 2021-10-26 PROCEDURE — 80053 COMPREHEN METABOLIC PANEL: CPT | Performed by: FAMILY MEDICINE

## 2021-10-26 PROCEDURE — 81003 URINALYSIS AUTO W/O SCOPE: CPT | Performed by: FAMILY MEDICINE

## 2021-10-27 ENCOUNTER — TELEPHONE (OUTPATIENT)
Dept: FAMILY MEDICINE CLINIC | Facility: CLINIC | Age: 71
End: 2021-10-27

## 2021-10-27 NOTE — TELEPHONE ENCOUNTER
Please advise the patient that I received his blood pressure log and reviewed these readings, they look very good. No need for medication at this point. He should check his blood pressure periodically to continue monitoring. Thank you.

## 2021-12-13 ENCOUNTER — LAB (OUTPATIENT)
Dept: LAB | Facility: HOSPITAL | Age: 71
End: 2021-12-13

## 2021-12-13 ENCOUNTER — OFFICE VISIT (OUTPATIENT)
Dept: ONCOLOGY | Facility: CLINIC | Age: 71
End: 2021-12-13

## 2021-12-13 VITALS
WEIGHT: 205 LBS | HEART RATE: 70 BPM | BODY MASS INDEX: 28.7 KG/M2 | HEIGHT: 71 IN | TEMPERATURE: 97.1 F | DIASTOLIC BLOOD PRESSURE: 85 MMHG | RESPIRATION RATE: 16 BRPM | OXYGEN SATURATION: 98 % | SYSTOLIC BLOOD PRESSURE: 122 MMHG

## 2021-12-13 DIAGNOSIS — D75.1 SECONDARY POLYCYTHEMIA: ICD-10-CM

## 2021-12-13 DIAGNOSIS — D72.19 OTHER EOSINOPHILIA: ICD-10-CM

## 2021-12-13 DIAGNOSIS — D75.1 SECONDARY POLYCYTHEMIA: Primary | ICD-10-CM

## 2021-12-13 LAB
BASOPHILS # BLD AUTO: 0.07 10*3/MM3 (ref 0–0.2)
BASOPHILS NFR BLD AUTO: 1.1 % (ref 0–1.5)
DEPRECATED RDW RBC AUTO: 40 FL (ref 37–54)
EOSINOPHIL # BLD AUTO: 0.32 10*3/MM3 (ref 0–0.4)
EOSINOPHIL NFR BLD AUTO: 5.1 % (ref 0.3–6.2)
ERYTHROCYTE [DISTWIDTH] IN BLOOD BY AUTOMATED COUNT: 12.5 % (ref 12.3–15.4)
HCT VFR BLD AUTO: 46.8 % (ref 37.5–51)
HGB BLD-MCNC: 16.2 G/DL (ref 13–17.7)
IMM GRANULOCYTES # BLD AUTO: 0.07 10*3/MM3 (ref 0–0.05)
IMM GRANULOCYTES NFR BLD AUTO: 1.1 % (ref 0–0.5)
LYMPHOCYTES # BLD AUTO: 1.52 10*3/MM3 (ref 0.7–3.1)
LYMPHOCYTES NFR BLD AUTO: 24.4 % (ref 19.6–45.3)
MCH RBC QN AUTO: 30.4 PG (ref 26.6–33)
MCHC RBC AUTO-ENTMCNC: 34.6 G/DL (ref 31.5–35.7)
MCV RBC AUTO: 87.8 FL (ref 79–97)
MONOCYTES # BLD AUTO: 0.66 10*3/MM3 (ref 0.1–0.9)
MONOCYTES NFR BLD AUTO: 10.6 % (ref 5–12)
NEUTROPHILS NFR BLD AUTO: 3.6 10*3/MM3 (ref 1.7–7)
NEUTROPHILS NFR BLD AUTO: 57.7 % (ref 42.7–76)
NRBC BLD AUTO-RTO: 0 /100 WBC (ref 0–0.2)
PLATELET # BLD AUTO: 220 10*3/MM3 (ref 140–450)
PMV BLD AUTO: 9.5 FL (ref 6–12)
RBC # BLD AUTO: 5.33 10*6/MM3 (ref 4.14–5.8)
WBC NRBC COR # BLD: 6.24 10*3/MM3 (ref 3.4–10.8)

## 2021-12-13 PROCEDURE — 85025 COMPLETE CBC W/AUTO DIFF WBC: CPT

## 2021-12-13 PROCEDURE — 36415 COLL VENOUS BLD VENIPUNCTURE: CPT

## 2021-12-13 PROCEDURE — 99213 OFFICE O/P EST LOW 20 MIN: CPT | Performed by: INTERNAL MEDICINE

## 2021-12-13 NOTE — PROGRESS NOTES
"Subjective     CHIEF COMPLAINT:      Chief Complaint   Patient presents with   • Follow-up     annual check up       HISTORY OF PRESENT ILLNESS:     Kingston Mancini is a 71 y.o. male patient who returns today for follow up on his polycythemia. He is using the CPAP machine regularly. He is not having problem with fatigue. He donated blood a few months ago. Because of his blood group, the blood bank contacts him frequently to donate blood.     ROS:  Pertinent ROS is in the HPI.     Past medical, surgical, social and family history were reviewed.     MEDICATIONS:    Current Outpatient Medications:   •  Black Pepper-Turmeric (TURMERIC COMPLEX/BLACK PEPPER PO), Take  by mouth., Disp: , Rfl:   •  Cholecalciferol (VITAMIN D3) 2000 units tablet, VITAMIN D3 2000 UNIT TABS, Disp: , Rfl:   •  loratadine (Claritin) 10 MG tablet, Take 10 mg by mouth Daily., Disp: , Rfl:   •  Multiple Vitamins-Minerals (ICAPS AREDS 2 PO), , Disp: , Rfl:   •  omeprazole (priLOSEC) 40 MG capsule, Take 40 mg by mouth Daily. before a meal, Disp: , Rfl:   •  Probiotic Product (PROBIOTIC-10 PO), Take  by mouth., Disp: , Rfl:     Objective   VITAL SIGNS:     Vitals:    12/13/21 1056   BP: 122/85   Pulse: 70   Resp: 16   Temp: 97.1 °F (36.2 °C)   TempSrc: Temporal   SpO2: 98%   Weight: 93 kg (205 lb)   Height: 180.3 cm (70.98\")   PainSc: 0-No pain     Body mass index is 28.6 kg/m².     Wt Readings from Last 5 Encounters:   12/13/21 93 kg (205 lb)   10/12/21 89.7 kg (197 lb 12.8 oz)   09/17/21 89.4 kg (197 lb)   04/09/21 90.3 kg (199 lb)   04/08/21 87.5 kg (193 lb)       PHYSICAL EXAMINATION:   GENERAL: The patient appears in good general condition, not in acute distress.   SKIN: No ecchymosis.  EYES: No jaundice.  LYMPHATICS: No cervical lymphadenopathy.  CHEST: Normal respiratory effort. CTAB. No added sounds.  CVS: No edema.  ABDOMEN: Soft. No tenderness. No Hepatomegaly. No Splenomegaly. No masses.    DIAGNOSTIC DATA:     Results from last 7 days   Lab " Units 12/13/21  1032   WBC 10*3/mm3 6.24   NEUTROS ABS 10*3/mm3 3.60   HEMOGLOBIN g/dL 16.2   HEMATOCRIT % 46.8   PLATELETS 10*3/mm3 220     Component      Latest Ref Rng & Units 8/19/2020 12/17/2020 10/26/2021 12/13/2021   Eosinophils Absolute      0.00 - 0.40 10*3/mm3 0.43 (H) 0.44 (H) 0.35 0.32     Assessment/Plan    *Secondary polycythemia.    · The highest count was on 8/19/2019 with a hemoglobin of 17.4 and hematocrit of 50.6%.    · It is attributed to his underlying sleep apnea syndrome.  He is using CPAP machine through nasal cannula.   · Chest x-ray on 9/23/2019 showed no evidence of underlying lung disease.  · The patient had blood work-up on 9/20/2019 that showed no evidence of Feliciano 2 or MPL mutations.   · Hematocrit was 47.8% on 1/14/2020.  · Hematocrit increased to 50.7% on 4/27/2020.  · He donated blood in [place] in 2020.  · Patient also donated blood in [place] a few months ago.  · The patient is using CPAP machine regularly.  · Hematocrit is today at 46.8%.  · The improvement in the secondary polycythemia is attributed to the patient using the CPAP machine regularly.    *Eosinophilia.    · This was first identified on 8/19/2019.    · The eosinophil count was mildly elevated ranging between 440 on 10/16/2019 and 640.   · Patient had IgE level obtained and was normal at 8.    · The eosinophil count fluctuated indicating that it was related to his underlying allergies.  · There is a mild increase in the immature granulocytes today, likely reactive due to underling allergies.      PLAN:    1.  Patient does not need to donate blood at this point since his HCT is <50%.  2.  Continue to use the CPAP machine.  3.  Since his HCT is mostly staying below 50% and since he has his labs done with his PCP regularly, I did not schedule him for a follow up visit. We will be available to see him in the future if the need arises.          Jennifer Man MD  12/13/21

## 2021-12-14 NOTE — PROGRESS NOTES
"Chief Complaint  Sleep Apnea    Subjective          Kingston Mancini presents to [facility]  History of Present Illness  Patient is here for follow up on AURELIA,patient states he has received his new machine about 3-4 months ago. he uses nasal pillows and gets supplies from [employer]     SLEEP TESTING HISTORY:    On NPSG at [facility] , 12/26/1017 patient had Moderate obstructive sleep apnea syndrome with apnea-hypopnea index of 29.4 per sleep hour, minimum SpO2 of 83%    PAP download:  The patient is on CPAP therapy at 6-10 cm/H2O.   Data indicates Excellent compliance. With 96% usage for more than 4 hours with an average usage of 6 hours 28 minutes. AHI down to 2.1 .  Average pressures 7.8.  Average large leak 0sec.     The patient's hypersomnia has resolved       [place] Sleepiness Scale:  Sitting and reading 1 WatchingTV 3  Sitting, inactive, in a public place 2  As a passenger in a car for 1 hour w/o a break  2  Lying down to rest in the afternoon  0  Sitting and talking to someone  0  Sitting quietly after a lunch  0  In a car, while stopped for traffic or a light  0  Total 8    Review of Systems   HENT: Negative for sinus pressure and sinus pain.    Eyes: Negative for pain and itching.   Respiratory: Negative for shortness of breath.    Cardiovascular: Negative for chest pain.   Gastrointestinal: Negative for abdominal distention.   Musculoskeletal: Negative for neck pain and neck stiffness.   Psychiatric/Behavioral: Negative for decreased concentration and sleep disturbance.     Objective   Vital Signs:   /99   Pulse 61   Temp 97.7 °F (36.5 °C) (Temporal)   Ht 180.3 cm (70.98\")   Wt 93.4 kg (206 lb)   BMI 28.75 kg/m²     Physical Exam  Vitals reviewed.   Neurological:      General: No focal deficit present.      Mental Status: He is oriented to person, place, and time.   Psychiatric:         Mood and Affect: Mood normal.         Behavior: Behavior normal.        Result Review :        "          Assessment and Plan    Diagnoses and all orders for this visit:    1. AURELIA on CPAP (Primary)      Continue CPAP 6-10, The patient is compliant with and benefiting from PAP therapy.      Follow Up   Return in about 1 year (around 12/16/2022).    Patient was given instructions and counseling regarding his condition or for health maintenance advice. Please see specific information pulled into the AVS if appropriate.       This document has been electronically signed by Joseph Seipel, MD on December 16, 2021 11:29 EST

## 2021-12-16 ENCOUNTER — OFFICE VISIT (OUTPATIENT)
Dept: NEUROLOGY | Facility: CLINIC | Age: 71
End: 2021-12-16

## 2021-12-16 VITALS
HEART RATE: 61 BPM | HEIGHT: 71 IN | TEMPERATURE: 97.7 F | BODY MASS INDEX: 28.84 KG/M2 | WEIGHT: 206 LBS | DIASTOLIC BLOOD PRESSURE: 99 MMHG | SYSTOLIC BLOOD PRESSURE: 155 MMHG

## 2021-12-16 DIAGNOSIS — G47.33 OSA ON CPAP: Primary | ICD-10-CM

## 2021-12-16 DIAGNOSIS — Z99.89 OSA ON CPAP: Primary | ICD-10-CM

## 2021-12-16 PROCEDURE — 99213 OFFICE O/P EST LOW 20 MIN: CPT | Performed by: PSYCHIATRY & NEUROLOGY

## 2022-04-04 ENCOUNTER — TELEPHONE (OUTPATIENT)
Dept: NEUROLOGY | Facility: CLINIC | Age: 72
End: 2022-04-04

## 2022-04-04 DIAGNOSIS — G47.33 OBSTRUCTIVE SLEEP APNEA: Primary | ICD-10-CM

## 2022-08-10 ENCOUNTER — OFFICE (AMBULATORY)
Dept: URBAN - METROPOLITAN AREA CLINIC 64 | Facility: CLINIC | Age: 72
End: 2022-08-10
Payer: COMMERCIAL

## 2022-08-10 VITALS
WEIGHT: 202 LBS | HEART RATE: 86 BPM | DIASTOLIC BLOOD PRESSURE: 74 MMHG | HEIGHT: 72 IN | SYSTOLIC BLOOD PRESSURE: 106 MMHG

## 2022-08-10 DIAGNOSIS — K57.30 DIVERTICULOSIS OF LARGE INTESTINE WITHOUT PERFORATION OR ABS: ICD-10-CM

## 2022-08-10 PROCEDURE — 99213 OFFICE O/P EST LOW 20 MIN: CPT | Performed by: INTERNAL MEDICINE

## 2022-08-10 RX ORDER — DOXYCYCLINE 100 MG/1
200 TABLET, FILM COATED ORAL
Qty: 20 | Refills: 1 | Status: COMPLETED
Start: 2022-08-10 | End: 2023-04-03

## 2022-10-17 ENCOUNTER — OFFICE VISIT (OUTPATIENT)
Dept: FAMILY MEDICINE CLINIC | Facility: CLINIC | Age: 72
End: 2022-10-17

## 2022-10-17 ENCOUNTER — LAB (OUTPATIENT)
Dept: FAMILY MEDICINE CLINIC | Facility: CLINIC | Age: 72
End: 2022-10-17

## 2022-10-17 VITALS
WEIGHT: 204.4 LBS | HEIGHT: 71 IN | RESPIRATION RATE: 16 BRPM | BODY MASS INDEX: 28.61 KG/M2 | HEART RATE: 62 BPM | OXYGEN SATURATION: 97 % | DIASTOLIC BLOOD PRESSURE: 92 MMHG | SYSTOLIC BLOOD PRESSURE: 143 MMHG | TEMPERATURE: 98.2 F

## 2022-10-17 DIAGNOSIS — E55.9 VITAMIN D DEFICIENCY: ICD-10-CM

## 2022-10-17 DIAGNOSIS — E78.2 MIXED HYPERLIPIDEMIA: ICD-10-CM

## 2022-10-17 DIAGNOSIS — Z85.46 PERSONAL HISTORY OF PROSTATE CANCER: ICD-10-CM

## 2022-10-17 DIAGNOSIS — Z00.00 MEDICARE ANNUAL WELLNESS VISIT, SUBSEQUENT: Primary | ICD-10-CM

## 2022-10-17 PROBLEM — H35.30 MACULAR DEGENERATION: Status: ACTIVE | Noted: 2022-10-17

## 2022-10-17 PROBLEM — D72.10 EOSINOPHILIA: Status: RESOLVED | Noted: 2019-09-20 | Resolved: 2022-10-17

## 2022-10-17 LAB
25(OH)D3 SERPL-MCNC: 58.4 NG/ML (ref 30–100)
ALBUMIN SERPL-MCNC: 4.8 G/DL (ref 3.5–5.2)
ALBUMIN/GLOB SERPL: 1.8 G/DL
ALP SERPL-CCNC: 79 U/L (ref 39–117)
ALT SERPL W P-5'-P-CCNC: 22 U/L (ref 1–41)
ANION GAP SERPL CALCULATED.3IONS-SCNC: 11 MMOL/L (ref 5–15)
AST SERPL-CCNC: 24 U/L (ref 1–40)
BASOPHILS # BLD AUTO: 0.1 10*3/MM3 (ref 0–0.2)
BASOPHILS NFR BLD AUTO: 1.4 % (ref 0–1.5)
BILIRUB SERPL-MCNC: 0.9 MG/DL (ref 0–1.2)
BUN SERPL-MCNC: 23 MG/DL (ref 8–23)
BUN/CREAT SERPL: 22.1 (ref 7–25)
CALCIUM SPEC-SCNC: 9.7 MG/DL (ref 8.6–10.5)
CHLORIDE SERPL-SCNC: 104 MMOL/L (ref 98–107)
CHOLEST SERPL-MCNC: 171 MG/DL (ref 0–200)
CO2 SERPL-SCNC: 27 MMOL/L (ref 22–29)
CREAT SERPL-MCNC: 1.04 MG/DL (ref 0.76–1.27)
DEPRECATED RDW RBC AUTO: 42.9 FL (ref 37–54)
EGFRCR SERPLBLD CKD-EPI 2021: 77.2 ML/MIN/1.73
EOSINOPHIL # BLD AUTO: 0.4 10*3/MM3 (ref 0–0.4)
EOSINOPHIL NFR BLD AUTO: 8.6 % (ref 0.3–6.2)
ERYTHROCYTE [DISTWIDTH] IN BLOOD BY AUTOMATED COUNT: 13.4 % (ref 12.3–15.4)
GLOBULIN UR ELPH-MCNC: 2.6 GM/DL
GLUCOSE SERPL-MCNC: 91 MG/DL (ref 65–99)
HCT VFR BLD AUTO: 49.1 % (ref 37.5–51)
HDLC SERPL-MCNC: 46 MG/DL (ref 40–60)
HGB BLD-MCNC: 16.4 G/DL (ref 13–17.7)
LDLC SERPL CALC-MCNC: 103 MG/DL (ref 0–100)
LDLC/HDLC SERPL: 2.19 {RATIO}
LYMPHOCYTES # BLD AUTO: 1.3 10*3/MM3 (ref 0.7–3.1)
LYMPHOCYTES NFR BLD AUTO: 25.7 % (ref 19.6–45.3)
MCH RBC QN AUTO: 31.2 PG (ref 26.6–33)
MCHC RBC AUTO-ENTMCNC: 33.4 G/DL (ref 31.5–35.7)
MCV RBC AUTO: 93.3 FL (ref 79–97)
MONOCYTES # BLD AUTO: 0.5 10*3/MM3 (ref 0.1–0.9)
MONOCYTES NFR BLD AUTO: 10.4 % (ref 5–12)
NEUTROPHILS NFR BLD AUTO: 2.7 10*3/MM3 (ref 1.7–7)
NEUTROPHILS NFR BLD AUTO: 53.9 % (ref 42.7–76)
NRBC BLD AUTO-RTO: 0.1 /100 WBC (ref 0–0.2)
PLATELET # BLD AUTO: 240 10*3/MM3 (ref 140–450)
PMV BLD AUTO: 8.1 FL (ref 6–12)
POTASSIUM SERPL-SCNC: 4.6 MMOL/L (ref 3.5–5.2)
PROT SERPL-MCNC: 7.4 G/DL (ref 6–8.5)
PSA SERPL-MCNC: <0.014 NG/ML (ref 0–4)
RBC # BLD AUTO: 5.26 10*6/MM3 (ref 4.14–5.8)
SODIUM SERPL-SCNC: 142 MMOL/L (ref 136–145)
TRIGL SERPL-MCNC: 121 MG/DL (ref 0–150)
VLDLC SERPL-MCNC: 22 MG/DL (ref 5–40)
WBC NRBC COR # BLD: 5 10*3/MM3 (ref 3.4–10.8)

## 2022-10-17 PROCEDURE — 82306 VITAMIN D 25 HYDROXY: CPT | Performed by: FAMILY MEDICINE

## 2022-10-17 PROCEDURE — 84153 ASSAY OF PSA TOTAL: CPT | Performed by: FAMILY MEDICINE

## 2022-10-17 PROCEDURE — G0439 PPPS, SUBSEQ VISIT: HCPCS | Performed by: FAMILY MEDICINE

## 2022-10-17 PROCEDURE — 1170F FXNL STATUS ASSESSED: CPT | Performed by: FAMILY MEDICINE

## 2022-10-17 PROCEDURE — 1159F MED LIST DOCD IN RCRD: CPT | Performed by: FAMILY MEDICINE

## 2022-10-17 PROCEDURE — 80061 LIPID PANEL: CPT | Performed by: FAMILY MEDICINE

## 2022-10-17 PROCEDURE — 80053 COMPREHEN METABOLIC PANEL: CPT | Performed by: FAMILY MEDICINE

## 2022-10-17 PROCEDURE — 85025 COMPLETE CBC W/AUTO DIFF WBC: CPT | Performed by: FAMILY MEDICINE

## 2022-10-17 PROCEDURE — 36415 COLL VENOUS BLD VENIPUNCTURE: CPT

## 2022-10-17 NOTE — PROGRESS NOTES
Subsequent Medicare Wellness Visit   The ABC's of the Annual Wellness Visit    Chief Complaint   Patient presents with   • Medicare Wellness-subsequent       HPI:  Kingston Mancini, -1950, is a 71 y.o. male who presents for a Subsequent Medicare Wellness Visit.  He lives with his wife and he is fully independent with his activities of daily living.  He denies any issues with depression or memory problems.  He stays active overall.  He tries to walk about 2 times a week.  He sometimes plays golf and tennis.  This past weekend he played tennis with his grandson. Patient does not report any chest pain, shortness of breath, dizziness, nausea, vomiting, or diarrhea, visual issues, headaches, numbness or tingling. No urinary issues reported like urgency, frequency, or discomfort upon urination.  No significant weight changes reported.  No swelling reported.  No rashes or any other skin issues reported. No emotional issues or insomnia.    Recent Hospitalizations:  No hospitalization(s) within the last year..    Current Medical Providers:  Patient Care Team:  Steff Singletary MD as PCP - General (Family Medicine)  Seipel, Joseph F, MD as Consulting Physician (Sleep Medicine)  Mono Moreno MD as Consulting Physician (Otolaryngology)  Akira Reyes MD as Consulting Physician (Gastroenterology)  Jeancarlos Ortega Jr., MD as Consulting Physician (Dermatology)  David Fitzpatrick MD as Consulting Physician (Ophthalmology)    Health Habits and Functional and Cognitive Screening and Depression Screening:  Functional & Cognitive Status 10/17/2022   Do you have difficulty preparing food and eating? No   Do you have difficulty bathing yourself, getting dressed or grooming yourself? No   Do you have difficulty using the toilet? No   Do you have difficulty moving around from place to place? No   Do you have trouble with steps or getting out of a bed or a chair? No   Current Diet Well Balanced Diet   Dental Exam Up  to date   Eye Exam Up to date   Exercise (times per week) 2 times per week   Current Exercises Include Walking   Do you need help using the phone?  No   Are you deaf or do you have serious difficulty hearing?  No   Do you need help with transportation? No   Do you need help shopping? No   Do you need help preparing meals?  No   Do you need help with housework?  No   Do you need help with laundry? No   Do you need help taking your medications? No   Do you need help managing money? No   Do you ever drive or ride in a car without wearing a seat belt? No   Have you felt unusual stress, anger or loneliness in the last month? No   Who do you live with? Spouse   If you need help, do you have trouble finding someone available to you? No   Do you have difficulty concentrating, remembering or making decisions? No       Compared to one year ago, the patient feels his physical health is the same and his mental health is the same.    Depression Screen:  PHQ-2/PHQ-9 Depression Screening 10/17/2022   Retired PHQ-9 Total Score -   Retired Total Score -   Little Interest or Pleasure in Doing Things 0-->not at all   Feeling Down, Depressed or Hopeless 0-->not at all   PHQ-9: Brief Depression Severity Measure Score 0         Past Medical/Family/Social History:  The following portions of the patient's history were reviewed and updated as appropriate: allergies, current medications, past family history, past medical history, past social history, past surgical history and problem list.    Allergies   Allergen Reactions   • Mushroom Unknown - High Severity   • Penicillin G Anaphylaxis   • Penicillins Anaphylaxis, Hives, Itching, Rash and Swelling         Current Outpatient Medications:   •  Black Pepper-Turmeric (TURMERIC COMPLEX/BLACK PEPPER PO), Take  by mouth., Disp: , Rfl:   •  Cholecalciferol (VITAMIN D3) 2000 units tablet, VITAMIN D3 2000 UNIT TABS, Disp: , Rfl:   •  loratadine (CLARITIN) 10 MG tablet, Take 10 mg by mouth Daily.,  Disp: , Rfl:   •  Multiple Vitamins-Minerals (ICAPS AREDS 2 PO), , Disp: , Rfl:   •  omeprazole (priLOSEC) 40 MG capsule, Take 40 mg by mouth Daily. before a meal, Disp: , Rfl:   •  Probiotic Product (PROBIOTIC-10 PO), Take  by mouth., Disp: , Rfl:     Aspirin use counseling: Does not need ASA (and currently is not on it)    Current medication list contains no high risk medications.  No harmful drug interactions have been identified.     Family History   Problem Relation Age of Onset   • Breast cancer Mother 67         age 70   • Cancer Mother    • Other Father         Amyloidosis   • Heart disease Father    • Asthma Sister    • Heart attack Maternal Grandmother    • Cancer Maternal Grandmother    • Kidney cancer Maternal Grandfather    • Cancer Paternal Grandmother    • Diabetes Paternal Grandfather    • Cancer Paternal Grandfather    • Cancer Other         Breast Cancer   • Diabetes Other    • Heart disease Other    • Hypertension Other    • Diverticulosis Other    • Other Other         Amyloidosis       Social History     Tobacco Use   • Smoking status: Never   • Smokeless tobacco: Never   Substance Use Topics   • Alcohol use: Yes     Alcohol/week: 6.0 standard drinks     Types: 4 Glasses of wine, 2 Cans of beer per week     Comment: 4-5 beer or glass wine daily       Past Surgical History:   Procedure Laterality Date   • CYST REMOVAL  2020   • EYE SURGERY  May, 2022    Cataracts in both eyes   • HAND SURGERY      , 10/93, 2007, 2013, 2013   • HERNIA REPAIR     • OTHER SURGICAL HISTORY      Abstraction from [facility] Duputryns Contracture   • PROSTATECTOMY         Patient Active Problem List   Diagnosis   • DDD (degenerative disc disease), cervical   • Derangement of knee   • Medicare annual wellness visit, subsequent   • Erectile dysfunction   • Hay fever   • Hyperlipidemia   • Hypertension   • Neck pain, acute   • AURELIA on CPAP   • Osteoarthritis   • Vitamin D deficiency   •  "Vitamin B12 deficiency   • Secondary polycythemia   • Personal history of prostate cancer   • Sprain of right elbow   • Elbow pain, right   • Macular degeneration       Review of Systems   Constitutional: Negative for activity change, fatigue and fever.   Respiratory: Negative for cough, shortness of breath and wheezing.    Cardiovascular: Negative for chest pain, palpitations and leg swelling.   Gastrointestinal: Negative for constipation, diarrhea and indigestion.   Skin: Negative for color change, dry skin and rash.   Neurological: Negative for tremors and headache.       Objective     Vitals:    10/17/22 0758   BP: 143/92   BP Location: Left arm   Patient Position: Sitting   Cuff Size: Adult   Pulse: 62   Resp: 16   Temp: 98.2 °F (36.8 °C)   TempSrc: Temporal   SpO2: 97%   Weight: 92.7 kg (204 lb 6.4 oz)   Height: 180.3 cm (70.98\")   PainSc: 0-No pain       BMI is >= 25 and <30. (Overweight) The following options were offered after discussion;: exercise counseling/recommendations      No results found.    The patient has no evidence of cognitve impairment.     Physical Exam  Vitals and nursing note reviewed.   Constitutional:       General: He is not in acute distress.     Appearance: Normal appearance. He is well-developed. He is not ill-appearing.   HENT:      Head: Normocephalic and atraumatic.   Cardiovascular:      Rate and Rhythm: Normal rate and regular rhythm.      Heart sounds: Normal heart sounds. No murmur heard.    No gallop.   Pulmonary:      Effort: Pulmonary effort is normal. No respiratory distress.      Breath sounds: Normal breath sounds. No wheezing, rhonchi or rales.   Chest:      Chest wall: No tenderness.   Musculoskeletal:      Cervical back: Normal range of motion and neck supple.   Neurological:      General: No focal deficit present.      Mental Status: He is alert and oriented to person, place, and time. Mental status is at baseline.   Psychiatric:         Mood and Affect: Mood normal. "         Recent Lab Results:     Lab Results   Component Value Date    CHOL 184 10/26/2021    TRIG 155 (H) 10/26/2021    HDL 47 10/26/2021    VLDL 27 10/26/2021    LDLHDL 2.26 10/26/2021       Assessment & Plan   Age-appropriate Screening Schedule:  Refer to the list below for future screening recommendations based on patient's age, sex and/or medical conditions.      Health Maintenance   Topic Date Due   • INFLUENZA VACCINE  08/01/2022   • LIPID PANEL  10/26/2022   • TDAP/TD VACCINES (2 - Tdap) 10/25/2026   • ZOSTER VACCINE  Completed       Medicare Risks and Personalized Health Plan:  Advance Directive Discussion  Cardiovascular risk  Dementia/Memory   Depression/Dysphoria  Diabetic Lab Screening   Fall Risk  Immunizations Discussed/Encouraged (specific immunizations; Influenza )  Prostate Cancer Screening       CMS-Preventive Services Quick Reference  Medicare Preventive Services Addressed:  Annual Wellness Visit (AWV)  Bone Density Measurements  Cardiovascular Disease Screening Tests (may do this order every 5 years in beneficiaries without signs or symptoms of cardiovascular disease)  Colorectal Cancer Screening, Colonoscopy  Prostate Cancer Screening     Advance Care Planning:  ACP discussion was held with the patient during this visit. Patient has an advance directive in EMR which is still valid.     Diagnoses and all orders for this visit:    1. Medicare annual wellness visit, subsequent (Primary)    2. Mixed hyperlipidemia  -     Comprehensive Metabolic Panel  -     Lipid Panel  -     CBC Auto Differential    3. Personal history of prostate cancer  -     PSA DIAGNOSTIC; Future    4. Vitamin D deficiency  -     Vitamin D 25 Hydroxy      Medicare wellness exam was done today.  I will be getting fasting blood work.  His blood pressure is noted to be slightly elevated today.  He reports monitoring his blood pressure at home and gets good readings and he will continue to monitor.  I reviewed his health  maintenance.  His last colonoscopy was in 4/2018 and 5-year follow-up was recommended.  He is vaccinated and boosted against COVID-19 and he is up to speed with his Shingrix vaccination and pneumonia shots.  He was advised to schedule the high-dose flu shot through the pharmacy as we are currently out of this vaccination.  Healthy lifestyle was reinforced    An After Visit Summary and PPPS with all of these plans were given to the patient.      Follow Up:  Return in about 1 year (around 10/17/2023) for Medicare Wellness.        Requested Prescriptions      No prescriptions requested or ordered in this encounter

## 2022-12-13 NOTE — PROGRESS NOTES
"Chief Complaint  Sleep Apnea    Subjective          Kingston Mancini presents to [facility] NEUROLOGY  History of Present Illness    Patient is here for follow up on AURELIA,. he uses nasal pillows and gets supplies from [employer]      SLEEP TESTING HISTORY:      [facility] patient had Moderate obstructive sleep apnea syndrome with apnea-hypopnea index of 29.4 per sleep hour, minimum SpO2 of 83%    PAP download:  The patient is on CPAP therapy at 6-10 cm/H2O.   Data indicates Excellent compliance. With 2.0% usage for more than 4 hours with an average usage of 6 hours 42 minutes. AHI down to 2.0 .  Average pressures 7.9.  Average large leak 0sec.     The patient's hypersomnia has resolved       [place] Sleepiness Scale:  Sitting and reading 2 WatchingTV 2  Sitting, inactive, in a public place 0  As a passenger in a car for 1 hour w/o a break  0  Lying down to rest in the afternoon  0  Sitting and talking to someone  0  Sitting quietly after a lunch  0  In a car, while stopped for traffic or a light  0  Total 4    Review of Systems   Constitutional: Negative for fatigue and fever.   HENT: Negative for sinus pressure and sinus pain.    Eyes: Negative for discharge and itching.   Respiratory: Positive for apnea. Negative for cough and shortness of breath.    Cardiovascular: Negative.    Gastrointestinal: Negative for abdominal pain and nausea.   Endocrine: Negative.    Genitourinary: Negative.    Musculoskeletal: Negative for back pain and neck pain.   Neurological: Negative for dizziness and light-headedness.   Psychiatric/Behavioral: Negative for agitation and confusion.       Objective   Vital Signs:   Temp 98.2 °F (36.8 °C)   Ht 180.3 cm (70.98\")   Wt 92.5 kg (204 lb)   BMI 28.47 kg/m²     Physical Exam  Vitals reviewed.   Pulmonary:      Effort: Pulmonary effort is normal. No respiratory distress.   Neurological:      Mental Status: He is alert and oriented to person, place, and time. "   Psychiatric:         Mood and Affect: Mood normal.         Behavior: Behavior normal.        Result Review :                 Assessment and Plan    Diagnoses and all orders for this visit:    1. AURELIA on CPAP (Primary)      Continue CPAP at current pressure   The patient is compliant with and benefiting from PAP therapy.      Follow Up   Return in about 1 year (around 12/15/2023).    Patient was given instructions and counseling regarding his condition or for health maintenance advice. Please see specific information pulled into the AVS if appropriate.       This document has been electronically signed by Joseph Seipel, MD on December 15, 2022 10:45 EST

## 2022-12-15 ENCOUNTER — OFFICE VISIT (OUTPATIENT)
Dept: NEUROLOGY | Facility: CLINIC | Age: 72
End: 2022-12-15

## 2022-12-15 VITALS — WEIGHT: 204 LBS | BODY MASS INDEX: 28.56 KG/M2 | TEMPERATURE: 98.2 F | HEIGHT: 71 IN

## 2022-12-15 DIAGNOSIS — Z99.89 OSA ON CPAP: Primary | ICD-10-CM

## 2022-12-15 DIAGNOSIS — G47.33 OSA ON CPAP: Primary | ICD-10-CM

## 2022-12-15 PROCEDURE — 99213 OFFICE O/P EST LOW 20 MIN: CPT | Performed by: PSYCHIATRY & NEUROLOGY

## 2022-12-15 RX ORDER — TRIAMCINOLONE ACETONIDE 1 MG/G
CREAM TOPICAL
COMMUNITY
Start: 2022-10-17

## 2023-02-27 PROBLEM — Z86.010 PERSONAL HISTORY OF COLONIC POLYPS: Status: ACTIVE | Noted: 2018-04-04

## 2023-04-03 ENCOUNTER — OFFICE (AMBULATORY)
Dept: URBAN - METROPOLITAN AREA CLINIC 64 | Facility: CLINIC | Age: 73
End: 2023-04-03
Payer: COMMERCIAL

## 2023-04-03 VITALS — HEIGHT: 72 IN | DIASTOLIC BLOOD PRESSURE: 84 MMHG | SYSTOLIC BLOOD PRESSURE: 136 MMHG | WEIGHT: 201 LBS

## 2023-04-03 DIAGNOSIS — K21.9 GASTRO-ESOPHAGEAL REFLUX DISEASE WITHOUT ESOPHAGITIS: ICD-10-CM

## 2023-04-03 DIAGNOSIS — K57.32 DIVERTICULITIS OF LARGE INTESTINE WITHOUT PERFORATION OR ABS: ICD-10-CM

## 2023-04-03 PROCEDURE — 99213 OFFICE O/P EST LOW 20 MIN: CPT

## 2023-04-20 ENCOUNTER — OFFICE (AMBULATORY)
Dept: URBAN - METROPOLITAN AREA PATHOLOGY 4 | Facility: PATHOLOGY | Age: 73
End: 2023-04-20
Payer: COMMERCIAL

## 2023-04-20 ENCOUNTER — ON CAMPUS - OUTPATIENT (AMBULATORY)
Dept: URBAN - METROPOLITAN AREA HOSPITAL 2 | Facility: HOSPITAL | Age: 73
End: 2023-04-20
Payer: COMMERCIAL

## 2023-04-20 VITALS
SYSTOLIC BLOOD PRESSURE: 112 MMHG | DIASTOLIC BLOOD PRESSURE: 102 MMHG | OXYGEN SATURATION: 96 % | RESPIRATION RATE: 15 BRPM | HEART RATE: 70 BPM | SYSTOLIC BLOOD PRESSURE: 157 MMHG | OXYGEN SATURATION: 99 % | SYSTOLIC BLOOD PRESSURE: 140 MMHG | HEART RATE: 71 BPM | RESPIRATION RATE: 20 BRPM | DIASTOLIC BLOOD PRESSURE: 100 MMHG | RESPIRATION RATE: 14 BRPM | HEART RATE: 69 BPM | HEIGHT: 72 IN | OXYGEN SATURATION: 98 % | SYSTOLIC BLOOD PRESSURE: 120 MMHG | DIASTOLIC BLOOD PRESSURE: 79 MMHG | DIASTOLIC BLOOD PRESSURE: 77 MMHG | SYSTOLIC BLOOD PRESSURE: 149 MMHG | OXYGEN SATURATION: 100 % | SYSTOLIC BLOOD PRESSURE: 115 MMHG | DIASTOLIC BLOOD PRESSURE: 82 MMHG | OXYGEN SATURATION: 97 % | RESPIRATION RATE: 16 BRPM | DIASTOLIC BLOOD PRESSURE: 92 MMHG | HEART RATE: 65 BPM | TEMPERATURE: 98.2 F | WEIGHT: 196 LBS | HEART RATE: 66 BPM | HEART RATE: 72 BPM | HEART RATE: 75 BPM | SYSTOLIC BLOOD PRESSURE: 125 MMHG | DIASTOLIC BLOOD PRESSURE: 84 MMHG | DIASTOLIC BLOOD PRESSURE: 80 MMHG

## 2023-04-20 DIAGNOSIS — K64.1 SECOND DEGREE HEMORRHOIDS: ICD-10-CM

## 2023-04-20 DIAGNOSIS — Z86.010 PERSONAL HISTORY OF COLONIC POLYPS: ICD-10-CM

## 2023-04-20 DIAGNOSIS — D12.4 BENIGN NEOPLASM OF DESCENDING COLON: ICD-10-CM

## 2023-04-20 DIAGNOSIS — K57.30 DIVERTICULOSIS OF LARGE INTESTINE WITHOUT PERFORATION OR ABS: ICD-10-CM

## 2023-04-20 PROBLEM — K63.5 POLYP OF COLON: Status: ACTIVE | Noted: 2023-04-20

## 2023-04-20 LAB
GI HISTOLOGY: A. UNSPECIFIED: (no result)
GI HISTOLOGY: B. UNSPECIFIED: (no result)
GI HISTOLOGY: PDF REPORT: (no result)

## 2023-04-20 PROCEDURE — 88305 TISSUE EXAM BY PATHOLOGIST: CPT | Mod: 26 | Performed by: INTERNAL MEDICINE

## 2023-04-20 PROCEDURE — 45380 COLONOSCOPY AND BIOPSY: CPT | Mod: PT | Performed by: INTERNAL MEDICINE

## 2023-05-08 ENCOUNTER — OFFICE VISIT (OUTPATIENT)
Dept: FAMILY MEDICINE CLINIC | Facility: CLINIC | Age: 73
End: 2023-05-08
Payer: MEDICARE

## 2023-05-08 VITALS
HEIGHT: 71 IN | HEART RATE: 72 BPM | DIASTOLIC BLOOD PRESSURE: 90 MMHG | RESPIRATION RATE: 16 BRPM | OXYGEN SATURATION: 98 % | WEIGHT: 203 LBS | BODY MASS INDEX: 28.42 KG/M2 | TEMPERATURE: 97.5 F | SYSTOLIC BLOOD PRESSURE: 136 MMHG

## 2023-05-08 DIAGNOSIS — M25.562 CHRONIC PAIN OF LEFT KNEE: Primary | ICD-10-CM

## 2023-05-08 DIAGNOSIS — G89.29 CHRONIC PAIN OF LEFT KNEE: Primary | ICD-10-CM

## 2023-05-08 RX ORDER — MELOXICAM 15 MG/1
15 TABLET ORAL DAILY
Qty: 30 TABLET | Refills: 0 | Status: SHIPPED | OUTPATIENT
Start: 2023-05-08

## 2023-05-08 NOTE — PROGRESS NOTES
Subjective   Chief Complaint   Patient presents with   • Knee Pain     Left, since 1/2023     Kingston Mancini is a 72 y.o. male.     Patient Care Team:  Steff Singletary MD as PCP - General (Family Medicine)  Seipel, Joseph F, MD as Consulting Physician (Sleep Medicine)  Mono Moreno MD as Consulting Physician (Otolaryngology)  Akira Reyes MD as Consulting Physician (Gastroenterology)  Jeancarlos Ortega Jr., MD as Consulting Physician (Dermatology)  David Fitzpatrick MD as Consulting Physician (Ophthalmology)    History of Present Illness  He is coming in today due to ongoing issues with the left knee.  He reports that he twisted the knee while trying to go back to bed in the middle of the night.  Since then he had couple of more mild injuries which made the situation worse.  The pain is worse with certain movement, it causes some achiness in the knee.  He plays golf and lately noted that it affects even this.  He has tried over-the-counter anti-inflammatories as needed with some relief.  No weakness reported.  He tells me also that several years ago he injured the meniscus in that knee and did physical therapy which really helped and he would like to get a referral for physical therapy       The following portions of the patient's history were reviewed and updated as appropriate: allergies, current medications, past family history, past medical history, past social history, past surgical history and problem list.  Past Medical History:   Diagnosis Date   • Cataract Years ago    Cataracts removed from both eyes May, 2022   • Erectile dysfunction    • History of basal cell cancer 2002    Abstraction from [facility] Past Medical Hx states Basal Cell on ear   • History of cardiac cath 2006    Abstraction From [facility] Heart Cath   • History of degenerative disc disease     DDD   • History of foreign travel     [place] July/August; [place] Oct/Nov   • HL (hearing loss) Jan 1996    Permanent  tinnitus from whiplash injury   • Hyperlipidemia    • Hypertension    • AURELIA on CPAP 2017    AURELIA npsg Dec 2017 ahi 29 auto cpap 7-14 nasal mask   • Osteoarthritis    • Prostate cancer     Hussein score 6, followed by Dr. nIfante, age 59     Past Surgical History:   Procedure Laterality Date   • CYST REMOVAL  2020   • EYE SURGERY  May, 2022    Cataracts in both eyes   • HAND SURGERY      , 10/93, 2007, 2013, 2013   • HERNIA REPAIR     • OTHER SURGICAL HISTORY      Abstraction from [facility] Duputryns Contracture   • PROSTATECTOMY       The patient has a family history of  Family History   Problem Relation Age of Onset   • Breast cancer Mother 67         age 70   • Cancer Mother    • Other Father         Amyloidosis   • Heart disease Father    • Asthma Sister    • Heart attack Maternal Grandmother    • Cancer Maternal Grandmother    • Kidney cancer Maternal Grandfather    • Cancer Paternal Grandmother    • Diabetes Paternal Grandfather    • Cancer Paternal Grandfather    • Cancer Other         Breast Cancer   • Diabetes Other    • Heart disease Other    • Hypertension Other    • Diverticulosis Other    • Other Other         Amyloidosis     Social History     Socioeconomic History   • Marital status:      Spouse name: Melodie   • Number of children: 3   • Years of education: College   Tobacco Use   • Smoking status: Never   • Smokeless tobacco: Never   Vaping Use   • Vaping Use: Never used   Substance and Sexual Activity   • Alcohol use: Yes     Alcohol/week: 6.0 standard drinks     Types: 4 Glasses of wine, 2 Cans of beer per week     Comment: 4-5 beer or glass wine daily   • Drug use: Never   • Sexual activity: Yes     Partners: Female     Birth control/protection: Surgical, Vasectomy       Review of Systems   Musculoskeletal: Positive for arthralgias. Negative for bursitis.   Neurological: Negative for weakness and numbness.     Visit Vitals  /90 (BP Location: Left arm,  "Patient Position: Sitting, Cuff Size: Adult)   Pulse 72   Temp 97.5 °F (36.4 °C) (Infrared)   Resp 16   Ht 180.3 cm (71\")   Wt 92.1 kg (203 lb)   SpO2 98%   BMI 28.31 kg/m²       BMI is >= 25 and <30. (Overweight) The following options were offered after discussion;: exercise counseling/recommendations      Current Outpatient Medications:   •  Black Pepper-Turmeric (TURMERIC COMPLEX/BLACK PEPPER PO), Take  by mouth., Disp: , Rfl:   •  Cholecalciferol (VITAMIN D3) 2000 units tablet, VITAMIN D3 2000 UNIT TABS, Disp: , Rfl:   •  loratadine (CLARITIN) 10 MG tablet, Take 10 mg by mouth Daily., Disp: , Rfl:   •  meloxicam (MOBIC) 15 MG tablet, Take 1 tablet by mouth Daily., Disp: 30 tablet, Rfl: 0  •  Multiple Vitamins-Minerals (ICAPS AREDS 2 PO), , Disp: , Rfl:   •  omeprazole (priLOSEC) 40 MG capsule, Take 40 mg by mouth Daily. before a meal, Disp: , Rfl:   •  Probiotic Product (PROBIOTIC-10 PO), Take  by mouth., Disp: , Rfl:     Objective   Physical Exam  Constitutional:       General: He is not in acute distress.     Appearance: Normal appearance. He is well-developed. He is not ill-appearing or diaphoretic.      Comments: Patient is in no distress, patient has normal voice and speech.  Normal respiratory effort.   HENT:      Head: Normocephalic and atraumatic.   Pulmonary:      Effort: Pulmonary effort is normal.   Musculoskeletal:      Cervical back: Normal range of motion and neck supple.      Comments: Full range of motion in both knees noted, no obvious deformity or swelling.  No joint line palpation tenderness.  Crepitus noted with passive movement in both knees   Neurological:      General: No focal deficit present.      Mental Status: He is alert and oriented to person, place, and time. Mental status is at baseline.   Psychiatric:         Mood and Affect: Mood normal.       Assessment & Plan   Diagnoses and all orders for this visit:    1. Chronic pain of left knee (Primary)  -     XR Knee 1 or 2 View Left; " Future  -     meloxicam (MOBIC) 15 MG tablet; Take 1 tablet by mouth Daily.  Dispense: 30 tablet; Refill: 0  -     Ambulatory Referral to Physical Therapy      I suspect that his pain is primarily due to osteoarthritis, however considering the type of the injury I discussed with the patient that he also may have ligamental problem or meniscal problem.  I will be starting him on meloxicam and I will be referring him to physical therapy.  I will get x-ray.  Patient understands that he possibly might need further evaluation and testing if no improvement.        Return if symptoms worsen or fail to improve, for Recheck.    Requested Prescriptions     Signed Prescriptions Disp Refills   • meloxicam (MOBIC) 15 MG tablet 30 tablet 0     Sig: Take 1 tablet by mouth Daily.

## 2023-05-10 ENCOUNTER — HOSPITAL ENCOUNTER (OUTPATIENT)
Dept: GENERAL RADIOLOGY | Facility: HOSPITAL | Age: 73
Discharge: HOME | End: 2023-05-10
Admitting: FAMILY MEDICINE
Payer: MEDICARE

## 2023-05-10 DIAGNOSIS — G89.29 CHRONIC PAIN OF LEFT KNEE: ICD-10-CM

## 2023-05-10 DIAGNOSIS — M25.562 CHRONIC PAIN OF LEFT KNEE: ICD-10-CM

## 2023-05-10 PROCEDURE — 73560 X-RAY EXAM OF KNEE 1 OR 2: CPT

## 2023-05-12 ENCOUNTER — OFFICE VISIT (OUTPATIENT)
Dept: FAMILY MEDICINE CLINIC | Facility: CLINIC | Age: 73
End: 2023-05-12
Payer: MEDICARE

## 2023-05-12 VITALS
HEART RATE: 57 BPM | RESPIRATION RATE: 16 BRPM | DIASTOLIC BLOOD PRESSURE: 81 MMHG | TEMPERATURE: 97.2 F | OXYGEN SATURATION: 98 % | WEIGHT: 203 LBS | HEIGHT: 71 IN | SYSTOLIC BLOOD PRESSURE: 127 MMHG | BODY MASS INDEX: 28.42 KG/M2

## 2023-05-12 DIAGNOSIS — S99.922A INJURY OF TOE ON LEFT FOOT, INITIAL ENCOUNTER: Primary | ICD-10-CM

## 2023-05-12 DIAGNOSIS — S99.922A INJURY OF GREAT TOE OF LEFT FOOT, INITIAL ENCOUNTER: ICD-10-CM

## 2023-05-12 PROBLEM — S53.401A SPRAIN OF RIGHT ELBOW: Status: RESOLVED | Noted: 2021-04-09 | Resolved: 2023-05-12

## 2023-05-12 RX ORDER — OMEPRAZOLE 20 MG/1
CAPSULE, DELAYED RELEASE ORAL
COMMUNITY
Start: 2023-05-10

## 2023-05-12 RX ORDER — SULFAMETHOXAZOLE AND TRIMETHOPRIM 800; 160 MG/1; MG/1
1 TABLET ORAL 2 TIMES DAILY
Qty: 20 TABLET | Refills: 0 | Status: SHIPPED | OUTPATIENT
Start: 2023-05-12 | End: 2023-05-22

## 2023-05-12 NOTE — ASSESSMENT & PLAN NOTE
Wound clean with peroxide, triple antibiotic ointment and gauze applied then wrapped with Ace bandage.  Rx Bactrim take as directed.   Follow up with PCP in 2 3 weeks sooner if needed.

## 2023-05-12 NOTE — PROGRESS NOTES
Subjective   Kingston Mancini is a 72 y.o. male.     History of Present Illness  72-year-old male patient present with complaint of left great toe injury.  Patient stated accidentally he dropped brick on his toe yesterday while working in the yard.  He noticed left great toe swelling, pain, skin tear and blood underneath the toenail.    Foot Injury   Incident onset: yesterday. The incident occurred in the yard. Injury mechanism: brick fell on great toe. The pain is present in the left foot. The pain is at a severity of 3/10. The pain is mild. The symptoms are aggravated by movement. He has tried NSAIDs for the symptoms.        The following portions of the patient's history were reviewed and updated as appropriate: past medical history, past social history, past surgical history and problem list.    Review of Systems   Constitutional: Negative for fever.   Skin: Positive for bruise.        Left  great toe injury with skin tear and bruising       Objective   Physical Exam  Vitals reviewed.   Musculoskeletal:      Comments: Left great toe skin tear, bruising and blood  underneath toenail.  Range of motion normal.  Trace swelling noted with mild tenderness.   Neurological:      Mental Status: He is alert and oriented to person, place, and time.       Vitals:    05/12/23 1148   BP: 127/81   Pulse: 57   Resp: 16   Temp: 97.2 °F (36.2 °C)   SpO2: 98%     Current Outpatient Medications on File Prior to Visit   Medication Sig Dispense Refill   • Black Pepper-Turmeric (TURMERIC COMPLEX/BLACK PEPPER PO) Take  by mouth.     • Cholecalciferol (VITAMIN D3) 2000 units tablet VITAMIN D3 2000 UNIT TABS     • loratadine (CLARITIN) 10 MG tablet Take 1 tablet by mouth Daily.     • meloxicam (MOBIC) 15 MG tablet Take 1 tablet by mouth Daily. 30 tablet 0   • Multiple Vitamins-Minerals (ICAPS AREDS 2 PO)      • omeprazole (priLOSEC) 20 MG capsule      • Probiotic Product (PROBIOTIC-10 PO) Take  by mouth.     • [DISCONTINUED] omeprazole  (priLOSEC) 40 MG capsule Take 40 mg by mouth Daily. before a meal       No current facility-administered medications on file prior to visit.         Assessment & Plan   Problems Addressed this Visit        Musculoskeletal and Injuries    Injury of great toe of left foot - Primary     Wound clean with peroxide, triple antibiotic ointment and gauze applied then wrapped with Ace bandage.  Rx Bactrim take as directed.   Follow up with PCP in 2 3 weeks sooner if needed.           Relevant Medications    sulfamethoxazole-trimethoprim (Bactrim DS) 800-160 MG per tablet   Diagnoses       Codes Comments    Injury of great toe on left foot, initial encounter    -  Primary ICD-10-CM: S99.922A  ICD-9-CM: 959.7     Injury of great toe of left foot, initial encounter     ICD-10-CM: S99.922A  ICD-9-CM: 959.7

## 2023-05-18 DIAGNOSIS — S99.922A INJURY OF TOE ON LEFT FOOT, INITIAL ENCOUNTER: ICD-10-CM

## 2023-05-18 RX ORDER — SULFAMETHOXAZOLE AND TRIMETHOPRIM 800; 160 MG/1; MG/1
1 TABLET ORAL 2 TIMES DAILY
Qty: 20 TABLET | Refills: 0 | Status: SHIPPED | OUTPATIENT
Start: 2023-05-18 | End: 2023-05-28

## 2023-05-18 NOTE — TELEPHONE ENCOUNTER
Caller: Kingston Mancini    Relationship: Self    Best call back number: [phone]    Requested Prescriptions:   Requested Prescriptions     Pending Prescriptions Disp Refills   • sulfamethoxazole-trimethoprim (Bactrim DS) 800-160 MG per tablet 20 tablet 0     Sig: Take 1 tablet by mouth 2 (Two) Times a Day for 10 days.        Pharmacy where request should be sent: [facility]/PHARMACY #[id number] [facility] [address] AT [facility] [phone] [facility] [phone]      Last office visit with prescribing clinician: 5/8/2023   Last telemedicine visit with prescribing clinician: 5/12/2023   Next office visit with prescribing clinician: 12/11/2023     Additional details provided by patient: PATIENT IS OUT OF TOWN AND LEFT HIS MEDICATION AT HOME. PATIENT NEEDS     Does the patient have less than a 3 day supply:  [x] Yes  [] No    Would you like a call back once the refill request has been completed: [x] Yes [] No    If the office needs to give you a call back, can they leave a voicemail: [] Yes [] No    [place] Rep   05/18/23 10:19 EDT

## 2023-09-07 ENCOUNTER — TELEPHONE (OUTPATIENT)
Dept: NEUROLOGY | Facility: CLINIC | Age: 73
End: 2023-09-07
Payer: MEDICARE

## 2023-09-07 DIAGNOSIS — G47.33 OBSTRUCTIVE SLEEP APNEA: Primary | ICD-10-CM

## 2023-09-07 NOTE — TELEPHONE ENCOUNTER
Provider: DR SEIPEL  Caller: PATIENT  Relationship to Patient: SELF  Pharmacy: [facility]  Phone Number: [phone]  Reason for Call: PATIENT STATES HIS CPAP IS BROKEN AND HE NEEDS A SCRIPT FOR A NEW ONE PER [facility]. THE TOUCH SCREEN IS BROKEN AND HE WOULD LIKE ANOTHER [facility] MACHINE. PLEASE ADVISE, THANK YOU.

## 2023-09-08 NOTE — TELEPHONE ENCOUNTER
Caller: Kingston Mancini    Relationship: Self    Best call back number: [phone]    What was the call regarding: PT CALLED AGAIN TO CHECK FOR STATUS UPDATE. I ADVISED PT THAT ISMAEL MCKEON IS WAITING TO HEAR FROM [facility] TO SEE IF HE IS ELIGIBLE FOR NEW MACHINE.     PT CONFIRMS HIS CURRENT MACHINE IS OVER 5 YEARS OLD.    Do you require a callback: YES, PLEASE CALL WITH UPDATE.    Is it okay if the provider responds through [facility]?: YES    PLEASE REVIEW AND ADVISE.

## 2023-09-13 NOTE — TELEPHONE ENCOUNTER
PATIENT CALLED STATES THAT HE HAS HEARD FROM [employer]. PATIENT STATES THAT [employer] NO LONGER CARRIES [employer] BRAND MACHINES PATIENT STATES THAT HE HAS PURCHASED MULTIPLE DIFFERENT PIECES FOR THIS BRAND OUT OF HIS OWN POCKET SO HE DOES NOT WANT TO SWITCH BRANDS.     PLEASE ADVISE PATIENT ON WHERE TO GO NOW     THANK YOU

## 2023-09-18 ENCOUNTER — TELEPHONE (OUTPATIENT)
Dept: NEUROLOGY | Facility: CLINIC | Age: 73
End: 2023-09-18

## 2023-09-18 NOTE — TELEPHONE ENCOUNTER
Caller: Kingston Mancini    Relationship: Self    Best call back number: [phone]    Who are you requesting to speak with (clinical staff, provider,  specific staff member): STAFF    What was the call regarding: PATIENT TELEPHONED TO SPEAK WITH ISMAEL. HE STATES HE NOW UNDERSTANDS THE ISSUE RELATED ON HOW TO GET A [facility] CPAP MACHINE.    HE IS WANTING TO KNOW IF HE CAN CONTINUE WITH THE ONE HE HAS FOR A WHILE OR SHOULD HE GET A RES-MED? HE ALSO WOULD LIKE TO KNOW IF THE TUBING & MASKING HE HAS FROM HIS [facility] MACHINE WOULD BE COMPATIBLE FOR THE RES-MED?    PLEASE REVIEW & CALL TO ADVISE-THANK YOU

## 2023-11-02 ENCOUNTER — TELEPHONE (OUTPATIENT)
Dept: NEUROLOGY | Facility: CLINIC | Age: 73
End: 2023-11-02
Payer: MEDICARE

## 2023-11-02 DIAGNOSIS — G47.33 OBSTRUCTIVE SLEEP APNEA: Primary | ICD-10-CM

## 2023-11-02 NOTE — TELEPHONE ENCOUNTER
----- Message from Kingston Mancini sent at 11/1/2023  5:17 PM EDT -----  Regarding: For Pattie Cruz  Contact: [phone]  I returned my machine to [facility] on October 20 and spoke to their billing department. All is well.    As mentioned in my last message I am ready for your office to send my records to [facility].

## 2023-11-16 ENCOUNTER — TELEPHONE (OUTPATIENT)
Dept: NEUROLOGY | Facility: CLINIC | Age: 73
End: 2023-11-16
Payer: MEDICARE

## 2023-11-16 DIAGNOSIS — G47.33 OBSTRUCTIVE SLEEP APNEA: Primary | ICD-10-CM

## 2023-12-11 ENCOUNTER — OFFICE VISIT (OUTPATIENT)
Dept: FAMILY MEDICINE CLINIC | Facility: CLINIC | Age: 73
End: 2023-12-11
Payer: MEDICARE

## 2023-12-11 ENCOUNTER — LAB (OUTPATIENT)
Dept: FAMILY MEDICINE CLINIC | Facility: CLINIC | Age: 73
End: 2023-12-11
Payer: MEDICARE

## 2023-12-11 VITALS
HEART RATE: 69 BPM | BODY MASS INDEX: 28.56 KG/M2 | RESPIRATION RATE: 16 BRPM | WEIGHT: 204 LBS | DIASTOLIC BLOOD PRESSURE: 85 MMHG | SYSTOLIC BLOOD PRESSURE: 126 MMHG | OXYGEN SATURATION: 97 % | TEMPERATURE: 98 F | HEIGHT: 71 IN

## 2023-12-11 DIAGNOSIS — Z00.00 MEDICARE ANNUAL WELLNESS VISIT, SUBSEQUENT: Primary | ICD-10-CM

## 2023-12-11 DIAGNOSIS — Z85.46 PERSONAL HISTORY OF PROSTATE CANCER: ICD-10-CM

## 2023-12-11 DIAGNOSIS — E78.2 MIXED HYPERLIPIDEMIA: ICD-10-CM

## 2023-12-11 LAB
ALBUMIN SERPL-MCNC: 4.8 G/DL (ref 3.5–5.2)
ALBUMIN/GLOB SERPL: 2.3 G/DL
ALP SERPL-CCNC: 67 U/L (ref 39–117)
ALT SERPL W P-5'-P-CCNC: 22 U/L (ref 1–41)
ANION GAP SERPL CALCULATED.3IONS-SCNC: 10 MMOL/L (ref 5–15)
AST SERPL-CCNC: 22 U/L (ref 1–40)
BILIRUB SERPL-MCNC: 0.7 MG/DL (ref 0–1.2)
BILIRUB UR QL STRIP: NEGATIVE
BUN SERPL-MCNC: 22 MG/DL (ref 8–23)
BUN/CREAT SERPL: 20.2 (ref 7–25)
CALCIUM SPEC-SCNC: 9.8 MG/DL (ref 8.6–10.5)
CHLORIDE SERPL-SCNC: 104 MMOL/L (ref 98–107)
CHOLEST SERPL-MCNC: 158 MG/DL (ref 0–200)
CLARITY UR: CLEAR
CO2 SERPL-SCNC: 25 MMOL/L (ref 22–29)
COLOR UR: YELLOW
CREAT SERPL-MCNC: 1.09 MG/DL (ref 0.76–1.27)
EGFRCR SERPLBLD CKD-EPI 2021: 72.1 ML/MIN/1.73
GLOBULIN UR ELPH-MCNC: 2.1 GM/DL
GLUCOSE SERPL-MCNC: 85 MG/DL (ref 65–99)
GLUCOSE UR STRIP-MCNC: NEGATIVE MG/DL
HDLC SERPL-MCNC: 44 MG/DL (ref 40–60)
HGB UR QL STRIP.AUTO: NEGATIVE
HOLD SPECIMEN: NORMAL
KETONES UR QL STRIP: NEGATIVE
LDLC SERPL CALC-MCNC: 92 MG/DL (ref 0–100)
LDLC/HDLC SERPL: 2.04 {RATIO}
LEUKOCYTE ESTERASE UR QL STRIP.AUTO: NEGATIVE
NITRITE UR QL STRIP: NEGATIVE
PH UR STRIP.AUTO: 5.5 [PH] (ref 5–8)
POTASSIUM SERPL-SCNC: 4 MMOL/L (ref 3.5–5.2)
PROT SERPL-MCNC: 6.9 G/DL (ref 6–8.5)
PROT UR QL STRIP: NEGATIVE
PSA SERPL-MCNC: <0.014 NG/ML (ref 0–4)
SODIUM SERPL-SCNC: 139 MMOL/L (ref 136–145)
SP GR UR STRIP: 1.02 (ref 1–1.03)
TRIGL SERPL-MCNC: 122 MG/DL (ref 0–150)
TSH SERPL DL<=0.05 MIU/L-ACNC: 1.23 UIU/ML (ref 0.27–4.2)
UROBILINOGEN UR QL STRIP: NORMAL
VLDLC SERPL-MCNC: 22 MG/DL (ref 5–40)

## 2023-12-11 PROCEDURE — 36415 COLL VENOUS BLD VENIPUNCTURE: CPT | Performed by: FAMILY MEDICINE

## 2023-12-11 PROCEDURE — G0439 PPPS, SUBSEQ VISIT: HCPCS | Performed by: FAMILY MEDICINE

## 2023-12-11 PROCEDURE — 84153 ASSAY OF PSA TOTAL: CPT | Performed by: FAMILY MEDICINE

## 2023-12-11 PROCEDURE — 3074F SYST BP LT 130 MM HG: CPT | Performed by: FAMILY MEDICINE

## 2023-12-11 PROCEDURE — 3079F DIAST BP 80-89 MM HG: CPT | Performed by: FAMILY MEDICINE

## 2023-12-11 PROCEDURE — 1170F FXNL STATUS ASSESSED: CPT | Performed by: FAMILY MEDICINE

## 2023-12-11 PROCEDURE — 84443 ASSAY THYROID STIM HORMONE: CPT | Performed by: FAMILY MEDICINE

## 2023-12-11 PROCEDURE — 80053 COMPREHEN METABOLIC PANEL: CPT | Performed by: FAMILY MEDICINE

## 2023-12-11 PROCEDURE — 80061 LIPID PANEL: CPT | Performed by: FAMILY MEDICINE

## 2023-12-11 PROCEDURE — 81003 URINALYSIS AUTO W/O SCOPE: CPT | Performed by: FAMILY MEDICINE

## 2023-12-11 NOTE — PROGRESS NOTES
Subsequent Medicare Wellness Visit   The ABC's of the Annual Wellness Visit    Chief Complaint   Patient presents with    Medicare Wellness-subsequent       HPI:  Kingston Mancini, -1950, is a 73 y.o. male who presents for a Subsequent Medicare Wellness Visit.  He lives with his wife, he is fully independent with his activities of daily living.  He enjoys playing golf and he typically plays once a week, he always walks during the game and typically gets about 5 miles when he plays golf.  He recently visited [place] with his wife and he did a lot of walking and enjoyed the visit.  He has dealt with some issues and pain in the left knee and he has done physical therapy which helps.  No issues with memory or depressive symptoms are being reported.  No frequent falls. Patient does not report any chest pain, shortness of breath, dizziness, nausea, vomiting, or diarrhea, visual issues, headaches, numbness or tingling. No urinary issues reported like urgency, frequency, or discomfort upon urination.  No significant weight changes reported.  No swelling reported.  No rashes or any other skin issues reported. No emotional issues or insomnia.    Recent Hospitalizations:  No hospitalization(s) within the last year..    Current Medical Providers:  Patient Care Team:  Steff Singletary MD as PCP - General (Family Medicine)  Seipel, Joseph F, MD as Consulting Physician (Sleep Medicine)  Mnoo Moreno MD as Consulting Physician (Otolaryngology)  Akira Reyes MD as Consulting Physician (Gastroenterology)  Jeancarlos Ortega Jr., MD as Consulting Physician (Dermatology)  David Fitzpatrick MD as Consulting Physician (Ophthalmology)    Health Habits and Functional and Cognitive Screening and Depression Screenin/11/2023     8:02 AM   Functional & Cognitive Status   Do you have difficulty preparing food and eating? No   Do you have difficulty bathing yourself, getting dressed or grooming yourself? No   Do  you have difficulty using the toilet? No   Do you have difficulty moving around from place to place? No   Do you have trouble with steps or getting out of a bed or a chair? No   Current Diet Well Balanced Diet   Dental Exam Up to date   Eye Exam Up to date   Exercise (times per week) 0 times per week   Current Exercises Include No Regular Exercise   Do you need help using the phone?  No   Are you deaf or do you have serious difficulty hearing?  No   Do you need help to go to places out of walking distance? No   Do you need help shopping? No   Do you need help preparing meals?  No   Do you need help with housework?  No   Do you need help with laundry? No   Do you need help taking your medications? No   Do you need help managing money? No   Do you ever drive or ride in a car without wearing a seat belt? No   Have you felt unusual stress, anger or loneliness in the last month? No   Who do you live with? Spouse   If you need help, do you have trouble finding someone available to you? No   Do you have difficulty concentrating, remembering or making decisions? No       Compared to one year ago, the patient feels his physical health is the same and his mental health is the same.    Depression Screen:      12/11/2023     8:02 AM   PHQ-2/PHQ-9 Depression Screening   Little Interest or Pleasure in Doing Things 0-->not at all   Feeling Down, Depressed or Hopeless 0-->not at all   PHQ-9: Brief Depression Severity Measure Score 0         Past Medical/Family/Social History:  The following portions of the patient's history were reviewed and updated as appropriate: allergies, current medications, past family history, past medical history, past social history, past surgical history, and problem list.    Allergies   Allergen Reactions    Mushroom Unknown - High Severity    Penicillin G Anaphylaxis    Penicillins Anaphylaxis, Hives, Itching, Rash and Swelling         Current Outpatient Medications:     Black Pepper-Turmeric (TURMERIC  COMPLEX/BLACK PEPPER PO), Take  by mouth., Disp: , Rfl:     Cholecalciferol (VITAMIN D3) 2000 units tablet, VITAMIN D3 2000 UNIT TABS, Disp: , Rfl:     loratadine (CLARITIN) 10 MG tablet, Take 1 tablet by mouth Daily., Disp: , Rfl:     Multiple Vitamins-Minerals (ICAPS AREDS 2 PO), , Disp: , Rfl:     omeprazole (priLOSEC) 20 MG capsule, , Disp: , Rfl:     Probiotic Product (PROBIOTIC-10 PO), Take  by mouth., Disp: , Rfl:     Aspirin use counseling: Does not need ASA (and currently is not on it)    Current medication list contains no high risk medications.  No harmful drug interactions have been identified.     Family History   Problem Relation Age of Onset    Breast cancer Mother 67         age 70    Cancer Mother     Other Father         Amyloidosis    Heart disease Father     Asthma Sister     Heart attack Maternal Grandmother     Cancer Maternal Grandmother     Kidney cancer Maternal Grandfather     Cancer Paternal Grandmother     Diabetes Paternal Grandfather     Cancer Paternal Grandfather     Cancer Other         Breast Cancer    Diabetes Other     Heart disease Other     Hypertension Other     Diverticulosis Other     Other Other         Amyloidosis       Social History     Tobacco Use    Smoking status: Never    Smokeless tobacco: Never   Substance Use Topics    Alcohol use: Yes     Alcohol/week: 6.0 standard drinks of alcohol     Types: 4 Glasses of wine, 2 Cans of beer per week     Comment: 4-5 beer or glass wine daily       Past Surgical History:   Procedure Laterality Date    CYST REMOVAL  2020    EYE SURGERY  May, 2022    Cataracts in both eyes    HAND SURGERY      , 10/93, 2007, 2013, 2013    HERNIA REPAIR      OTHER SURGICAL HISTORY      Abstraction from [facility] [employer]    PROSTATECTOMY  2011       Patient Active Problem List   Diagnosis    DDD (degenerative disc disease), cervical    Derangement of knee    Medicare annual wellness visit, subsequent     "Erectile dysfunction    Hay fever    Hyperlipidemia    Hypertension    Neck pain, acute    AURELIA on CPAP    Osteoarthritis    Vitamin D deficiency    Vitamin B12 deficiency    Secondary polycythemia    Personal history of prostate cancer    Elbow pain, right    Macular degeneration    Chronic pain of left knee    Injury of great toe of left foot       Review of Systems   Constitutional:  Negative for activity change, fatigue and fever.   Respiratory:  Negative for shortness of breath and wheezing.    Cardiovascular:  Negative for chest pain, palpitations and leg swelling.   Musculoskeletal:  Positive for arthralgias. Negative for back pain.   Skin:  Negative for rash.   Neurological:  Negative for tremors and headache.       Objective     Vitals:    12/11/23 0805   BP: 126/85   BP Location: Left arm   Patient Position: Sitting   Cuff Size: Adult   Pulse: 69   Resp: 16   Temp: 98 °F (36.7 °C)   TempSrc: Infrared   SpO2: 97%   Weight: 92.5 kg (204 lb)   Height: 180.3 cm (71\")   PainSc: 0-No pain              No results found.    The patient has no evidence of cognitve impairment.     Physical Exam  Vitals and nursing note reviewed.   Constitutional:       General: He is not in acute distress.     Appearance: Normal appearance. He is well-developed. He is not ill-appearing.   HENT:      Head: Normocephalic and atraumatic.   Cardiovascular:      Rate and Rhythm: Normal rate and regular rhythm.      Heart sounds: Normal heart sounds. No murmur heard.     No gallop.   Pulmonary:      Effort: Pulmonary effort is normal. No respiratory distress.      Breath sounds: Normal breath sounds. No wheezing, rhonchi or rales.   Chest:      Chest wall: No tenderness.   Musculoskeletal:      Cervical back: Normal range of motion and neck supple.   Neurological:      General: No focal deficit present.      Mental Status: He is alert and oriented to person, place, and time. Mental status is at baseline.   Psychiatric:         Mood and " Affect: Mood normal.         Recent Lab Results:     Lab Results   Component Value Date    CHOL 171 10/17/2022    TRIG 121 10/17/2022    HDL 46 10/17/2022    VLDL 22 10/17/2022    LDLHDL 2.19 10/17/2022       Assessment & Plan   Age-appropriate Screening Schedule:  Refer to the list below for future screening recommendations based on patient's age, sex and/or medical conditions.      Health Maintenance   Topic Date Due    COVID-19 Vaccine (8 - 2023-24 season) 09/26/2023    LIPID PANEL  10/17/2023    BMI FOLLOWUP  05/08/2024    ANNUAL WELLNESS VISIT  12/11/2024    TDAP/TD VACCINES (3 - Tdap) 10/25/2026    COLORECTAL CANCER SCREENING  04/20/2033    HEPATITIS C SCREENING  Completed    INFLUENZA VACCINE  Completed    Pneumococcal Vaccine 65+  Completed    ZOSTER VACCINE  Completed       Medicare Risks and Personalized Health Plan:  Advance Directive Discussion  Cardiovascular risk  Colon Cancer Screening  Dementia/Memory   Depression/Dysphoria  Diabetic Lab Screening   Fall Risk  Immunizations Discussed/Encouraged (specific immunizations; COVID19 and RSV (Respiratory Syncytial Virus) )  Prostate Cancer Screening       CMS-Preventive Services Quick Reference  Medicare Preventive Services Addressed:  Annual Wellness Visit (AWV)  Bone Density Measurements  Cardiovascular Disease Screening Tests (may do this order every 5 years in beneficiaries without signs or symptoms of cardiovascular disease)  Colorectal Cancer Screening, Colonoscopy  Prostate Cancer Screening     Advance Care Planning:  ACP discussion was held with the patient during this visit. Patient has an advance directive in EMR which is still valid.     Diagnoses and all orders for this visit:    1. Medicare annual wellness visit, subsequent (Primary)    2. Personal history of prostate cancer  -     PSA DIAGNOSTIC; Future  -     Urinalysis With Culture If Indicated - Urine, Clean Catch  -     PSA DIAGNOSTIC  -     [place] Urine Culture Tube - Urine, Clean  Catch    3. Mixed hyperlipidemia  -     Comprehensive Metabolic Panel  -     Lipid Panel  -     Urinalysis With Culture If Indicated - Urine, Clean Catch  -     TSH  -     [place] Urine Culture Tube - Urine, Clean Catch      Medicare wellness exam was done today.  I will be getting fasting blood work.  His last colonoscopy was in 4/2023 and 5-year follow-up colonoscopy was recommended.  I also reviewed his vaccination.  He is vaccinated and boosted against COVID-19 and the most recent COVID-19 booster shot was recommended.  He is up to speed for the rest of his vaccines.  Healthy lifestyle was reinforced.    An After Visit Summary and PPPS with all of these plans were given to the patient.      Follow Up:  Return in about 1 year (around 12/11/2024) for Medicare Wellness.        Requested Prescriptions      No prescriptions requested or ordered in this encounter

## 2023-12-18 NOTE — PROGRESS NOTES
"Chief Complaint  Sleep Apnea    Subjective          Kingston Mancini presents to [facility] NEUROLOGY  History of Present Illness  AURELIA f/u patient states he is benefiting from pap therapy,   but he had to take his resmed back to [facility] due to he was not getting enough air and pressure was not adjusted correctly   and his touch screen went out on machine so he couldn't use his humidifier, patient states he went back on his old dream station that's about 6 yrs old, he uses nasal pillows and wants to gets supplies from [employer]     SLEEP TESTING HISTORY:     On NPSG at [facility] , 12/26/1017 patient had Moderate obstructive sleep apnea syndrome with apnea-hypopnea index of 29.4 per sleep hour, minimum SpO2 of 83%    PAP download:  The patient is on CPAP therapy at 6-10 cm/H2O.   Data indicates Excellent compliance. With 86% usage for more than 4 hours with an average usage of 6 hours 40 minutes. AHI down to 2.3 .  Average pressures 7.6.  Average large leak 0sec.     The patient's hypersomnia has resolved       [place] Sleepiness Scale:  Sitting and reading 1 WatchingTV 3  Sitting, inactive, in a public place 1  As a passenger in a car for 1 hour w/o a break  1  Lying down to rest in the afternoon  3  Sitting and talking to someone  0  Sitting quietly after a lunch  0  In a car, while stopped for traffic or a light  0  Total 9    Review of Systems   Constitutional:  Positive for appetite change.   HENT:  Positive for hearing loss, postnasal drip and tinnitus.    Respiratory:  Positive for apnea.    Musculoskeletal:  Positive for gait problem.   Allergic/Immunologic: Positive for environmental allergies and food allergies.   Hematological:  Bruises/bleeds easily.   All other systems reviewed and are negative.        Objective   Vital Signs:   /84   Pulse 67   Resp 16   Ht 180.3 cm (71\")   Wt 92.5 kg (204 lb)   BMI 28.45 kg/m²     Physical Exam  Vitals reviewed.   Pulmonary:      Effort: Pulmonary " effort is normal.   Neurological:      General: No focal deficit present.      Mental Status: He is alert and oriented to person, place, and time.   Psychiatric:         Mood and Affect: Mood normal.        Result Review :                 Assessment and Plan    Diagnoses and all orders for this visit:    1. AURELIA on CPAP (Primary)      Pt needs new machine due to the current machine LED screen is not working.  Continue nasal pillow  The patient is compliant with and benefiting from PAP therapy.      Follow Up   Return in about 1 year (around 12/19/2024).    Patient was given instructions and counseling regarding his condition or for health maintenance advice. Please see specific information pulled into the AVS if appropriate.       This document has been electronically signed by Joseph Seipel, MD on December 19, 2023 09:38 EST

## 2023-12-19 ENCOUNTER — OFFICE VISIT (OUTPATIENT)
Dept: NEUROLOGY | Facility: CLINIC | Age: 73
End: 2023-12-19
Payer: MEDICARE

## 2023-12-19 VITALS
DIASTOLIC BLOOD PRESSURE: 84 MMHG | HEART RATE: 67 BPM | SYSTOLIC BLOOD PRESSURE: 125 MMHG | RESPIRATION RATE: 16 BRPM | BODY MASS INDEX: 28.56 KG/M2 | WEIGHT: 204 LBS | HEIGHT: 71 IN

## 2023-12-19 DIAGNOSIS — G47.33 OSA ON CPAP: Primary | ICD-10-CM

## 2023-12-19 PROCEDURE — 99213 OFFICE O/P EST LOW 20 MIN: CPT | Performed by: PSYCHIATRY & NEUROLOGY

## 2023-12-19 PROCEDURE — 1159F MED LIST DOCD IN RCRD: CPT | Performed by: PSYCHIATRY & NEUROLOGY

## 2023-12-19 PROCEDURE — 3079F DIAST BP 80-89 MM HG: CPT | Performed by: PSYCHIATRY & NEUROLOGY

## 2023-12-19 PROCEDURE — 3074F SYST BP LT 130 MM HG: CPT | Performed by: PSYCHIATRY & NEUROLOGY

## 2023-12-19 PROCEDURE — 1160F RVW MEDS BY RX/DR IN RCRD: CPT | Performed by: PSYCHIATRY & NEUROLOGY

## 2024-03-05 NOTE — PROGRESS NOTES
Chief Complaint  Marco treated with cpap    Subjective          Kingston Mancini presents to [facility] NEUROLOGY  History of Present Illness  MARCO f/u patient states he is benefiting from pap therapy,he uses nasal pillows and gets supplies from  [employer]     SLEEP TESTING HISTORY:     On NPSG at [facility] , 12/26/1017 patient had Moderate obstructive sleep apnea syndrome with apnea-hypopnea index of 29.4 per sleep hour, minimum SpO2 of 83%    PAP download:  The patient is on CPAP therapy at 6-16 cm/H2O.   Data indicates Excellent compliance. With 83% usage for more than 4 hours with an average usage of 5 hours 37 minutes. AHI down to 2.3 .  Average pressures 7.7.  Average large leak 31sec.     The patient's hypersomnia has resolved           Review of Systems   Constitutional:  Negative for fatigue.   Respiratory:  Negative for apnea and shortness of breath.    Psychiatric/Behavioral:  Negative for sleep disturbance.        Objective   Vital Signs:   There were no vitals taken for this visit.    Physical Exam  Constitutional:       Appearance: Normal appearance.   Neurological:      Mental Status: He is alert and oriented to person, place, and time.   Psychiatric:         Mood and Affect: Mood normal.        Result Review :                 Assessment and Plan    Diagnoses and all orders for this visit:    1. MARCO (obstructive sleep apnea) (Primary)      Continue cpap at current pressure  The patient is compliant with and benefiting from PAP therapy.      Follow Up   Return in about 1 year (around 3/7/2025).    Patient was given instructions and counseling regarding his condition or for health maintenance advice. Please see specific information pulled into the AVS if appropriate.       This document has been electronically signed by Joseph Seipel, MD on March 8, 2024 13:36 EST

## 2024-03-07 ENCOUNTER — TELEMEDICINE (OUTPATIENT)
Dept: NEUROLOGY | Facility: CLINIC | Age: 74
End: 2024-03-07
Payer: MEDICARE

## 2024-03-07 DIAGNOSIS — G47.33 OSA (OBSTRUCTIVE SLEEP APNEA): Primary | ICD-10-CM

## 2024-03-07 PROCEDURE — 99213 OFFICE O/P EST LOW 20 MIN: CPT | Performed by: PSYCHIATRY & NEUROLOGY

## 2024-04-16 RX ORDER — OMEPRAZOLE 20 MG/1
20 CAPSULE, DELAYED RELEASE ORAL DAILY
Qty: 90 CAPSULE | Refills: 1 | Status: SHIPPED | OUTPATIENT
Start: 2024-04-16

## 2024-05-01 ENCOUNTER — HOSPITAL ENCOUNTER (OUTPATIENT)
Dept: CARDIOLOGY | Facility: HOSPITAL | Age: 74
Discharge: HOME | End: 2024-05-01
Payer: MEDICARE

## 2024-05-01 ENCOUNTER — LAB (OUTPATIENT)
Dept: LAB | Facility: HOSPITAL | Age: 74
End: 2024-05-01
Payer: MEDICARE

## 2024-05-01 ENCOUNTER — TRANSCRIBE ORDERS (OUTPATIENT)
Dept: ADMINISTRATIVE | Facility: HOSPITAL | Age: 74
End: 2024-05-01
Payer: MEDICARE

## 2024-05-01 DIAGNOSIS — E78.2 MIXED HYPERLIPIDEMIA: ICD-10-CM

## 2024-05-01 DIAGNOSIS — H02.403 PTOSIS OF BOTH EYELIDS: Primary | ICD-10-CM

## 2024-05-01 DIAGNOSIS — I49.1 PREMATURE ATRIAL COMPLEX: ICD-10-CM

## 2024-05-01 DIAGNOSIS — H02.403 PTOSIS OF BOTH EYELIDS: ICD-10-CM

## 2024-05-01 LAB
ANION GAP SERPL CALCULATED.3IONS-SCNC: 11 MMOL/L (ref 5–15)
BUN SERPL-MCNC: 26 MG/DL (ref 8–23)
BUN/CREAT SERPL: 24.5 (ref 7–25)
CALCIUM SPEC-SCNC: 9.6 MG/DL (ref 8.6–10.5)
CHLORIDE SERPL-SCNC: 106 MMOL/L (ref 98–107)
CO2 SERPL-SCNC: 23 MMOL/L (ref 22–29)
CREAT SERPL-MCNC: 1.06 MG/DL (ref 0.76–1.27)
DEPRECATED RDW RBC AUTO: 39.3 FL (ref 37–54)
EGFRCR SERPLBLD CKD-EPI 2021: 74.6 ML/MIN/1.73
ERYTHROCYTE [DISTWIDTH] IN BLOOD BY AUTOMATED COUNT: 12 % (ref 12.3–15.4)
GLUCOSE SERPL-MCNC: 97 MG/DL (ref 65–99)
HCT VFR BLD AUTO: 46.8 % (ref 37.5–51)
HGB BLD-MCNC: 16 G/DL (ref 13–17.7)
MCH RBC QN AUTO: 31.2 PG (ref 26.6–33)
MCHC RBC AUTO-ENTMCNC: 34.2 G/DL (ref 31.5–35.7)
MCV RBC AUTO: 91.2 FL (ref 79–97)
PLATELET # BLD AUTO: 263 10*3/MM3 (ref 140–450)
PMV BLD AUTO: 10.1 FL (ref 6–12)
POTASSIUM SERPL-SCNC: 4.1 MMOL/L (ref 3.5–5.2)
QT INTERVAL: 415 MS
QTC INTERVAL: 513 MS
RBC # BLD AUTO: 5.13 10*6/MM3 (ref 4.14–5.8)
SODIUM SERPL-SCNC: 140 MMOL/L (ref 136–145)
WBC NRBC COR # BLD AUTO: 7.32 10*3/MM3 (ref 3.4–10.8)

## 2024-05-01 PROCEDURE — 84439 ASSAY OF FREE THYROXINE: CPT | Performed by: FAMILY MEDICINE

## 2024-05-01 PROCEDURE — 83880 ASSAY OF NATRIURETIC PEPTIDE: CPT | Performed by: FAMILY MEDICINE

## 2024-05-01 PROCEDURE — 85027 COMPLETE CBC AUTOMATED: CPT

## 2024-05-01 PROCEDURE — 93005 ELECTROCARDIOGRAM TRACING: CPT | Performed by: OPHTHALMOLOGY

## 2024-05-01 PROCEDURE — 80048 BASIC METABOLIC PNL TOTAL CA: CPT

## 2024-05-01 PROCEDURE — 84443 ASSAY THYROID STIM HORMONE: CPT | Performed by: FAMILY MEDICINE

## 2024-05-01 PROCEDURE — 84480 ASSAY TRIIODOTHYRONINE (T3): CPT

## 2024-05-01 PROCEDURE — 36415 COLL VENOUS BLD VENIPUNCTURE: CPT

## 2024-05-03 ENCOUNTER — OFFICE VISIT (OUTPATIENT)
Dept: FAMILY MEDICINE CLINIC | Facility: CLINIC | Age: 74
End: 2024-05-03
Payer: MEDICARE

## 2024-05-03 ENCOUNTER — HOSPITAL ENCOUNTER (OUTPATIENT)
Dept: GENERAL RADIOLOGY | Facility: HOSPITAL | Age: 74
Discharge: HOME | End: 2024-05-03
Payer: MEDICARE

## 2024-05-03 VITALS
BODY MASS INDEX: 28.42 KG/M2 | DIASTOLIC BLOOD PRESSURE: 75 MMHG | SYSTOLIC BLOOD PRESSURE: 112 MMHG | HEART RATE: 72 BPM | TEMPERATURE: 98.4 F | RESPIRATION RATE: 16 BRPM | OXYGEN SATURATION: 95 % | WEIGHT: 203 LBS | HEIGHT: 71 IN

## 2024-05-03 DIAGNOSIS — R06.02 EXERTIONAL SHORTNESS OF BREATH: Primary | ICD-10-CM

## 2024-05-03 DIAGNOSIS — E78.2 MIXED HYPERLIPIDEMIA: ICD-10-CM

## 2024-05-03 DIAGNOSIS — K21.9 GASTROESOPHAGEAL REFLUX DISEASE WITHOUT ESOPHAGITIS: ICD-10-CM

## 2024-05-03 DIAGNOSIS — I49.1 PREMATURE ATRIAL COMPLEX: ICD-10-CM

## 2024-05-03 LAB
NT-PROBNP SERPL-MCNC: 120 PG/ML (ref 0–900)
T3 SERPL-MCNC: 141 NG/DL (ref 80–200)
T4 FREE SERPL-MCNC: 1.34 NG/DL (ref 0.93–1.7)
TSH SERPL DL<=0.05 MIU/L-ACNC: 0.19 UIU/ML (ref 0.27–4.2)

## 2024-05-03 PROCEDURE — 3074F SYST BP LT 130 MM HG: CPT | Performed by: FAMILY MEDICINE

## 2024-05-03 PROCEDURE — 3078F DIAST BP <80 MM HG: CPT | Performed by: FAMILY MEDICINE

## 2024-05-03 PROCEDURE — 71046 X-RAY EXAM CHEST 2 VIEWS: CPT

## 2024-05-03 PROCEDURE — G2211 COMPLEX E/M VISIT ADD ON: HCPCS | Performed by: FAMILY MEDICINE

## 2024-05-03 PROCEDURE — 99214 OFFICE O/P EST MOD 30 MIN: CPT | Performed by: FAMILY MEDICINE

## 2024-05-03 RX ORDER — OMEPRAZOLE 20 MG/1
20 CAPSULE, DELAYED RELEASE ORAL DAILY
Qty: 90 CAPSULE | Refills: 0 | Status: SHIPPED | OUTPATIENT
Start: 2024-05-03

## 2024-05-03 NOTE — PROGRESS NOTES
Patient wanted you to know that he has an appointment with Dr. Dixon next Wednesday. He also stated that he is unable to get either a halter monitor or an Echo until May 3, through [facility]   05/03/24  2:28pm

## 2024-05-03 NOTE — PROGRESS NOTES
Subjective   Chief Complaint   Patient presents with    Fatigue    Shortness of Breath     Kingston Mancini is a 73 y.o. male.     Patient Care Team:  Steff Singletary MD as PCP - General (Family Medicine)  Seipel, Joseph F, MD as Consulting Physician (Sleep Medicine)  Mono Moreno MD as Consulting Physician (Otolaryngology)  Akira Reyes MD as Consulting Physician (Gastroenterology)  Jeancarlos Ortega Jr., MD as Consulting Physician (Dermatology)  David Fitzpatrick MD as Consulting Physician (Ophthalmology)    History of Present Illness  He is coming in today to address some of his symptoms and concerns.  Patient is scheduled for the eyelid surgery next week through the [facility] physician group, they ordered some blood work which included CBC and BMP and also EKG which he just got done yesterday.  When he looked at the EKG report he noted some abnormalities in that got him concerned and got his attention.  He tells me that for the last 2 months or so he has been experiencing some shortness of breath with exertion which she is now concerned about and wonders if this has some correlation with the EKG findings.  He has been playing golf for a long time and in the past he had no issues to play 18 holes, but lately he is only able to to play about 9 holes and then he gets really short of breath and tired.  He is on CPAP due to sleep apnea, he reports that the CPAP was started about 6 years ago.  On further questioning he tells me that at times he has some discomfort in the chest which is typically associated with the shortness of breath.  He always attributed this to his acid reflux, but now when he saw the EKG he is of course concerned.  He is not a smoker and never has been.  No dizziness or syncope reported.       The following portions of the patient's history were reviewed and updated as appropriate: allergies, current medications, past family history, past medical history, past social history, past  surgical history, and problem list.  Past Medical History:   Diagnosis Date    Cataract Years ago    Cataracts removed from both eyes May, 2022    Erectile dysfunction     History of basal cell cancer     Abstraction from [facility] Past Medical Hx states Basal Cell on ear    History of cardiac cath     Abstraction From [facility] Heart [facility]    History of degenerative disc disease     DDD    History of foreign travel     [place] ; [place] Oct/Nov    HL (hearing loss) 1996    Permanent tinnitus from whiplash injury    Hyperlipidemia     Hypertension     AURELIA on CPAP 2017    AURELIA npsg Dec 2017 ahi 29 auto cpap 7-14 nasal mask    Osteoarthritis     Prostate cancer     [place] score 6, followed by Dr. Infante, age 59     Past Surgical History:   Procedure Laterality Date    CYST REMOVAL  2020    EYE SURGERY  May, 2022    Cataracts in both eyes    HAND SURGERY      , 10/93, 2007, 2013, 2013    HERNIA REPAIR      OTHER SURGICAL HISTORY      Abstraction from [facility] Duputryns Contracture    PROSTATECTOMY       The patient has a family history of  Family History   Problem Relation Age of Onset    Breast cancer Mother 67         age 70    Cancer Mother     Other Father         Amyloidosis    Heart disease Father     Asthma Sister     Heart attack Maternal Grandmother     Cancer Maternal Grandmother     Kidney cancer Maternal Grandfather     Cancer Paternal Grandmother     Diabetes Paternal Grandfather     Cancer Paternal Grandfather     Cancer Other         Breast Cancer    Diabetes Other     Heart disease Other     Hypertension Other     Diverticulosis Other     Other Other         Amyloidosis     Social History     Socioeconomic History    Marital status:      Spouse name: Melodie    Number of children: 3    Years of education: College   Tobacco Use    Smoking status: Never    Smokeless tobacco: Never   Vaping Use    Vaping status: Never Used   Substance  "and Sexual Activity    Alcohol use: Yes     Alcohol/week: 6.0 standard drinks of alcohol     Types: 4 Glasses of wine, 2 Cans of beer per week     Comment: 4-5 beer or glass wine daily    Drug use: Never    Sexual activity: Yes     Partners: Female     Birth control/protection: Surgical, Vasectomy       Review of Systems   Constitutional:  Positive for fatigue. Negative for activity change and fever.   Respiratory:  Positive for shortness of breath. Negative for cough and wheezing.    Cardiovascular:  Negative for chest pain, palpitations and leg swelling.   Gastrointestinal:  Negative for constipation, diarrhea and indigestion.   Skin:  Negative for color change, dry skin and rash.   Neurological:  Negative for tremors and headache.     Visit Vitals  /75 (BP Location: Left arm, Patient Position: Sitting, Cuff Size: Adult)   Pulse 72   Temp 98.4 °F (36.9 °C) (Infrared)   Resp 16   Ht 180.3 cm (71\")   Wt 92.1 kg (203 lb)   SpO2 95%   BMI 28.31 kg/m²           Current Outpatient Medications:     Black Pepper-Turmeric (TURMERIC COMPLEX/BLACK PEPPER PO), Take  by mouth., Disp: , Rfl:     Cholecalciferol (VITAMIN D3) 2000 units tablet, VITAMIN D3 2000 UNIT TABS, Disp: , Rfl:     loratadine (CLARITIN) 10 MG tablet, Take 1 tablet by mouth Daily., Disp: , Rfl:     Multiple Vitamins-Minerals (ICAPS AREDS 2 PO), , Disp: , Rfl:     Probiotic Product (PROBIOTIC-10 PO), Take  by mouth., Disp: , Rfl:     omeprazole (priLOSEC) 20 MG capsule, Take 1 capsule by mouth Daily., Disp: 90 capsule, Rfl: 0    Objective   Physical Exam  Vitals and nursing note reviewed.   Constitutional:       General: He is not in acute distress.     Appearance: Normal appearance. He is well-developed. He is not ill-appearing.   HENT:      Head: Normocephalic and atraumatic.   Cardiovascular:      Rate and Rhythm: Normal rate and regular rhythm.      Heart sounds: Normal heart sounds. No murmur heard.     No gallop.   Pulmonary:      Effort: " Pulmonary effort is normal. No respiratory distress.      Breath sounds: Normal breath sounds. No wheezing, rhonchi or rales.   Chest:      Chest wall: No tenderness.   Musculoskeletal:      Cervical back: Normal range of motion and neck supple.   Neurological:      General: No focal deficit present.      Mental Status: He is alert and oriented to person, place, and time. Mental status is at baseline.   Psychiatric:         Mood and Affect: Mood normal.         FOLLOWING LABS WERE REVIEWED TODAY:  CMP          12/11/2023    08:49 5/1/2024    14:29   CMP   Glucose 85  97    BUN 22  26    Creatinine 1.09  1.06    EGFR 72.1  74.6    Sodium 139  140    Potassium 4.0  4.1    Chloride 104  106    Calcium 9.8  9.6    Total Protein 6.9     Albumin 4.8     Globulin 2.1     Total Bilirubin 0.7     Alkaline Phosphatase 67     AST (SGOT) 22     ALT (SGPT) 22     Albumin/Globulin Ratio 2.3     BUN/Creatinine Ratio 20.2  24.5    Anion Gap 10.0  11.0      CBC          5/1/2024    14:29   CBC   WBC 7.32    RBC 5.13    Hemoglobin 16.0    Hematocrit 46.8    MCV 91.2    MCH 31.2    MCHC 34.2    RDW 12.0    Platelets 263        Assessment & Plan   Diagnoses and all orders for this visit:    1. Exertional shortness of breath (Primary)  -     TSH  -     BNP  -     Ambulatory Referral to Cardiology  -     Adult Transthoracic Echo Complete W/ Cont if Necessary Per Protocol; Future  -     Holter Monitor - 24 Hour; Future  -     XR Chest 2 View    2. Mixed hyperlipidemia  -     TSH  -     BNP  -     T4, Free  -     T3; Future    3. Premature atrial complex  -     Ambulatory Referral to Cardiology  -     Adult Transthoracic Echo Complete W/ Cont if Necessary Per Protocol; Future  -     Holter Monitor - 24 Hour; Future  -     T4, Free  -     T3; Future    4. Gastroesophageal reflux disease without esophagitis  -     omeprazole (priLOSEC) 20 MG capsule; Take 1 capsule by mouth Daily.  Dispense: 90 capsule; Refill: 0      Patient presents today  with some exertional shortness of breath over the last 2 months or so, EKG was done recently, which I reviewed today.  It shows some PVCs and PACs and prolonged QT interval.  I also reviewed the recent labs and additional blood work is being done today.  I will be getting an echo and Holter monitor.  Patient will be referred to see the cardiologist for further evaluation and opinion.      Return in about 1 month (around 6/3/2024) for Next scheduled follow up.      Requested Prescriptions     Signed Prescriptions Disp Refills    omeprazole (priLOSEC) 20 MG capsule 90 capsule 0     Sig: Take 1 capsule by mouth Daily.       Steff Singletary MD  05/03/2024

## 2024-05-07 NOTE — H&P (VIEW-ONLY)
Date of Office Visit: 2024  Encounter Provider: Dr. Cleve Bertrand  Place of Service: [facility] CARDIOLOGY [place]  Patient Name: Kingston Mancini  :1950  Steff Singletary MD    Chief Complaint   Patient presents with    Shortness of Breath    Hypertension    Hyperlipidemia    Consult     History of Present Illness:    I am pleased to see Mr. Mancini in my office today as a new consultation.    As you know, patient is 73 years old white gentleman whose past medical history is insignificant for any medical illness who is referred to me for new onset symptom of progressive shortness of breath and exhaustion and chest discomfort.    From last few months patient reports that he is unable to finish his activities which he normally does.  He has a small yard in which she is most in on with a push mower.  He has to stop multiple times because he gets extremely exhausted and short of breath with diaphoresis.  Patient also reports chest discomfort described as a burning.  Patient had few episodes at rest.  Patient denies orthopnea PND no syncope or presyncope.    Patient does not smoke or abuse alcohol.  Patient does not have previous history of coronary artery disease    EKG done at PCP office showed normal sinus rhythm PVCs noted.    I strongly believe that patient is having angina equivalent.  I will recommend to proceed with cardiac catheterization.  I would start aspirin and Imdur.  Risk and benefit and alternative options are explained to the patient.        Past Medical History:   Diagnosis Date    Cataract Years ago    Cataracts removed from both eyes May, 2022    Erectile dysfunction     History of basal cell cancer     [facility]straction from [facility] Past Medical Hx states Basal Cell on ear    History of cardiac cath     [facility]straction From [facility] Heart Cath    History of degenerative disc disease     DDD    History of foreign travel     [place] ; [place] Oct/Nov    HL (hearing  loss) Jan 1996    Permanent tinnitus from whiplash injury    Hyperlipidemia     Hypertension     AURELIA on CPAP 12/2017    AURELIA npsg Dec 2017 ahi 29 auto cpap 7-14 nasal mask    Osteoarthritis     Prostate cancer 2011    [place] score 6, followed by Dr. Infante, age 59         Past Surgical History:   Procedure Laterality Date    CYST REMOVAL  01/09/2020    EYE SURGERY  May, 2022    Cataracts in both eyes    HAND SURGERY      4/93, 10/93, 9/2007, 4/2013, 7/2013    HERNIA REPAIR  2002    OTHER SURGICAL HISTORY      Abstraction from [facility]    PROSTATECTOMY  2011           Current Outpatient Medications:     Black Pepper-Turmeric (TURMERIC COMPLEX/BLACK PEPPER PO), Take  by mouth., Disp: , Rfl:     Cholecalciferol (VITAMIN D3) 2000 units tablet, VITAMIN D3 2000 UNIT TABS, Disp: , Rfl:     loratadine (CLARITIN) 10 MG tablet, Take 1 tablet by mouth Daily., Disp: , Rfl:     Multiple Vitamins-Minerals (ICAPS AREDS 2 PO), , Disp: , Rfl:     omeprazole (priLOSEC) 20 MG capsule, Take 1 capsule by mouth Daily., Disp: 90 capsule, Rfl: 0    Probiotic Product (PROBIOTIC-10 PO), Take  by mouth., Disp: , Rfl:     aspirin 81 MG EC tablet, Take 1 tablet by mouth Daily., Disp: 100 tablet, Rfl: 1    isosorbide mononitrate (IMDUR) 30 MG 24 hr tablet, Take 1 tablet by mouth Daily., Disp: 90 tablet, Rfl: 3      Social History     Socioeconomic History    Marital status:      Spouse name: Melodie    Number of children: 3    Years of education: College   Tobacco Use    Smoking status: Never    Smokeless tobacco: Never   Vaping Use    Vaping status: Never Used   Substance and Sexual Activity    Alcohol use: Yes     Alcohol/week: 6.0 standard drinks of alcohol     Types: 4 Glasses of wine, 2 Cans of beer per week     Comment: 4-5 beer or glass wine daily    Drug use: Never    Sexual activity: Yes     Partners: Female     Birth control/protection: Surgical, Vasectomy         Review of Systems   Constitutional: Negative  "for chills and fever.   HENT:  Negative for ear discharge and nosebleeds.    Eyes:  Negative for discharge and redness.   Cardiovascular:  Positive for chest pain. Negative for orthopnea, palpitations, paroxysmal nocturnal dyspnea and syncope.   Respiratory:  Positive for shortness of breath. Negative for cough and wheezing.    Endocrine: Negative for heat intolerance.   Skin:  Negative for rash.   Musculoskeletal:  Negative for arthritis and myalgias.   Gastrointestinal:  Negative for abdominal pain, melena, nausea and vomiting.   Genitourinary:  Negative for dysuria and hematuria.   Neurological:  Negative for dizziness, light-headedness, numbness and tremors.   Psychiatric/Behavioral:  Negative for depression. The patient is not nervous/anxious.        Procedures    Procedures    ECG 12 Lead    (Results Pending)           Objective:    /89 (BP Location: Right arm, Patient Position: Sitting, Cuff Size: Large Adult)   Pulse 60   Resp 16   Ht 180.3 cm (70.98\")   Wt 92.1 kg (203 lb)   SpO2 97%   BMI 28.33 kg/m²         Constitutional:       Appearance: Well-developed.   Eyes:      General: No scleral icterus.        Right eye: No discharge.   HENT:      Head: Normocephalic and atraumatic.   Neck:      Thyroid: No thyromegaly.      Lymphadenopathy: No cervical adenopathy.   Pulmonary:      Effort: Pulmonary effort is normal. No respiratory distress.      Breath sounds: Normal breath sounds. No wheezing. No rales.   Cardiovascular:      Normal rate. Regular rhythm.      No gallop.    Edema:     Peripheral edema absent.   Abdominal:      Tenderness: There is no abdominal tenderness.   Skin:     Findings: No erythema or rash.   Neurological:      Mental Status: Alert and oriented to person, place, and time.             Assessment:       Diagnosis Plan   1. Angina pectoris  Case Request Cath Lab: Left Heart Cath    CBC (No Diff)    Basic Metabolic Panel    Protime-INR    aPTT    ECG 12 Lead      2. Shortness " of breath  Case Request Cath Lab: Left Heart Cath    CBC (No Diff)    Basic Metabolic Panel    Protime-INR    aPTT    ECG 12 Lead      3. Obstructive sleep apnea  Case Request Cath Lab: Left Heart Cath    CBC (No Diff)    Basic Metabolic Panel    Protime-INR    aPTT    ECG 12 Lead               Plan:       MDM:    1.  Angina pectoris:    Start aspirin and Imdur.  Proceed with cardiac catheterization    2.  Shortness of breath:    I suspect it is angina pectoris.  I would proceed with echocardiogram after the cardiac catheterization    3.  Obstructive sleep apnea:    Patient is using CPAP.

## 2024-05-08 ENCOUNTER — TELEPHONE (OUTPATIENT)
Dept: CARDIOLOGY | Facility: CLINIC | Age: 74
End: 2024-05-08

## 2024-05-08 ENCOUNTER — OFFICE VISIT (OUTPATIENT)
Dept: CARDIOLOGY | Facility: CLINIC | Age: 74
End: 2024-05-08
Payer: MEDICARE

## 2024-05-08 VITALS
RESPIRATION RATE: 16 BRPM | DIASTOLIC BLOOD PRESSURE: 89 MMHG | WEIGHT: 203 LBS | HEART RATE: 60 BPM | BODY MASS INDEX: 28.42 KG/M2 | SYSTOLIC BLOOD PRESSURE: 131 MMHG | HEIGHT: 71 IN | OXYGEN SATURATION: 97 %

## 2024-05-08 DIAGNOSIS — R06.02 SHORTNESS OF BREATH: ICD-10-CM

## 2024-05-08 DIAGNOSIS — G47.33 OBSTRUCTIVE SLEEP APNEA: ICD-10-CM

## 2024-05-08 DIAGNOSIS — I20.9 ANGINA PECTORIS: Primary | ICD-10-CM

## 2024-05-08 PROCEDURE — 3075F SYST BP GE 130 - 139MM HG: CPT | Performed by: INTERNAL MEDICINE

## 2024-05-08 PROCEDURE — 1159F MED LIST DOCD IN RCRD: CPT | Performed by: INTERNAL MEDICINE

## 2024-05-08 PROCEDURE — 99204 OFFICE O/P NEW MOD 45 MIN: CPT | Performed by: INTERNAL MEDICINE

## 2024-05-08 PROCEDURE — 3079F DIAST BP 80-89 MM HG: CPT | Performed by: INTERNAL MEDICINE

## 2024-05-08 PROCEDURE — 1160F RVW MEDS BY RX/DR IN RCRD: CPT | Performed by: INTERNAL MEDICINE

## 2024-05-08 RX ORDER — ISOSORBIDE MONONITRATE 30 MG/1
30 TABLET, EXTENDED RELEASE ORAL DAILY
Qty: 90 TABLET | Refills: 3 | Status: SHIPPED | OUTPATIENT
Start: 2024-05-08

## 2024-05-08 RX ORDER — ASPIRIN 81 MG/1
81 TABLET ORAL DAILY
Qty: 100 TABLET | Refills: 1 | Status: SHIPPED | OUTPATIENT
Start: 2024-05-08

## 2024-05-09 ENCOUNTER — HOSPITAL ENCOUNTER (OUTPATIENT)
Dept: CARDIOLOGY | Facility: HOSPITAL | Age: 74
Discharge: HOME | End: 2024-05-09
Payer: MEDICARE

## 2024-05-09 ENCOUNTER — PATIENT ROUNDING (BHMG ONLY) (OUTPATIENT)
Dept: CARDIOLOGY | Facility: CLINIC | Age: 74
End: 2024-05-09
Payer: MEDICARE

## 2024-05-09 VITALS
SYSTOLIC BLOOD PRESSURE: 108 MMHG | WEIGHT: 203 LBS | BODY MASS INDEX: 29.06 KG/M2 | HEIGHT: 70 IN | HEART RATE: 58 BPM | DIASTOLIC BLOOD PRESSURE: 71 MMHG

## 2024-05-09 DIAGNOSIS — R06.02 EXERTIONAL SHORTNESS OF BREATH: ICD-10-CM

## 2024-05-09 DIAGNOSIS — I49.1 PREMATURE ATRIAL COMPLEX: ICD-10-CM

## 2024-05-09 LAB
AORTIC ARCH: 3.5 CM
ASCENDING AORTA: 3.7 CM
BH CV ECHO MEAS - ACS: 2.18 CM
BH CV ECHO MEAS - AO MAX PG: 7.4 MMHG
BH CV ECHO MEAS - AO MEAN PG: 3.5 MMHG
BH CV ECHO MEAS - AO ROOT DIAM: 3.9 CM
BH CV ECHO MEAS - AO V2 MAX: 135.7 CM/SEC
BH CV ECHO MEAS - AO V2 VTI: 24.3 CM
BH CV ECHO MEAS - AVA(I,D): 2.5 CM2
BH CV ECHO MEAS - EDV(CUBED): 41.9 ML
BH CV ECHO MEAS - EDV(MOD-SP2): 83 ML
BH CV ECHO MEAS - EDV(MOD-SP4): 82 ML
BH CV ECHO MEAS - EF(MOD-BP): 65.7 %
BH CV ECHO MEAS - EF(MOD-SP2): 68.7 %
BH CV ECHO MEAS - EF(MOD-SP4): 64.6 %
BH CV ECHO MEAS - ESV(CUBED): 14.3 ML
BH CV ECHO MEAS - ESV(MOD-SP2): 26 ML
BH CV ECHO MEAS - ESV(MOD-SP4): 29 ML
BH CV ECHO MEAS - FS: 30.1 %
BH CV ECHO MEAS - IVS/LVPW: 1.14 CM
BH CV ECHO MEAS - IVSD: 0.93 CM
BH CV ECHO MEAS - LAT PEAK E' VEL: 7.9 CM/SEC
BH CV ECHO MEAS - LV DIASTOLIC VOL/BSA (35-75): 39 CM2
BH CV ECHO MEAS - LV MASS(C)D: 84.8 GRAMS
BH CV ECHO MEAS - LV MAX PG: 4.2 MMHG
BH CV ECHO MEAS - LV MEAN PG: 2.34 MMHG
BH CV ECHO MEAS - LV SYSTOLIC VOL/BSA (12-30): 13.8 CM2
BH CV ECHO MEAS - LV V1 MAX: 102.2 CM/SEC
BH CV ECHO MEAS - LV V1 VTI: 19.8 CM
BH CV ECHO MEAS - LVIDD: 3.5 CM
BH CV ECHO MEAS - LVIDS: 2.43 CM
BH CV ECHO MEAS - LVOT AREA: 3.1 CM2
BH CV ECHO MEAS - LVOT DIAM: 1.99 CM
BH CV ECHO MEAS - LVPWD: 0.82 CM
BH CV ECHO MEAS - MED PEAK E' VEL: 6.4 CM/SEC
BH CV ECHO MEAS - MV A DUR: 0.17 SEC
BH CV ECHO MEAS - MV A MAX VEL: 88.6 CM/SEC
BH CV ECHO MEAS - MV DEC SLOPE: 232.2 CM/SEC2
BH CV ECHO MEAS - MV DEC TIME: 0.28 SEC
BH CV ECHO MEAS - MV E MAX VEL: 74.9 CM/SEC
BH CV ECHO MEAS - MV E/A: 0.85
BH CV ECHO MEAS - MV MAX PG: 3 MMHG
BH CV ECHO MEAS - MV MEAN PG: 1.37 MMHG
BH CV ECHO MEAS - MV P1/2T: 98.8 MSEC
BH CV ECHO MEAS - MV V2 VTI: 33 CM
BH CV ECHO MEAS - MVA(P1/2T): 2.23 CM2
BH CV ECHO MEAS - MVA(VTI): 1.88 CM2
BH CV ECHO MEAS - PA ACC TIME: 0.14 SEC
BH CV ECHO MEAS - PA V2 MAX: 85.3 CM/SEC
BH CV ECHO MEAS - PULM A REVS DUR: 0.12 SEC
BH CV ECHO MEAS - PULM A REVS VEL: 26.7 CM/SEC
BH CV ECHO MEAS - PULM DIAS VEL: 27.1 CM/SEC
BH CV ECHO MEAS - PULM S/D: 1.7
BH CV ECHO MEAS - PULM SYS VEL: 46.2 CM/SEC
BH CV ECHO MEAS - RV MAX PG: 2.45 MMHG
BH CV ECHO MEAS - RV V1 MAX: 78.2 CM/SEC
BH CV ECHO MEAS - RV V1 VTI: 13.9 CM
BH CV ECHO MEAS - SV(LVOT): 62 ML
BH CV ECHO MEAS - SV(MOD-SP2): 57 ML
BH CV ECHO MEAS - SV(MOD-SP4): 53 ML
BH CV ECHO MEAS - SVI(LVOT): 29.5 ML/M2
BH CV ECHO MEAS - SVI(MOD-SP2): 27.1 ML/M2
BH CV ECHO MEAS - SVI(MOD-SP4): 25.2 ML/M2
BH CV ECHO MEAS - TAPSE (>1.6): 2.8 CM
BH CV ECHO MEASUREMENTS AVERAGE E/E' RATIO: 10.48
BH CV XLRA - RV BASE: 3 CM
BH CV XLRA - RV LENGTH: 6.5 CM
BH CV XLRA - RV MID: 2 CM
BH CV XLRA - TDI S': 9.9 CM/SEC
LEFT ATRIUM VOLUME INDEX: 21.2 ML/M2
SINUS: 3.2 CM
STJ: 3.1 CM

## 2024-05-09 PROCEDURE — 93306 TTE W/DOPPLER COMPLETE: CPT

## 2024-05-18 ENCOUNTER — LAB (OUTPATIENT)
Dept: LAB | Facility: HOSPITAL | Age: 74
End: 2024-05-18
Payer: MEDICARE

## 2024-05-18 DIAGNOSIS — I20.9 ANGINA PECTORIS: ICD-10-CM

## 2024-05-18 DIAGNOSIS — G47.33 OBSTRUCTIVE SLEEP APNEA: ICD-10-CM

## 2024-05-18 DIAGNOSIS — R06.02 SHORTNESS OF BREATH: ICD-10-CM

## 2024-05-18 LAB
ANION GAP SERPL CALCULATED.3IONS-SCNC: 11 MMOL/L (ref 5–15)
APTT PPP: 29.3 SECONDS (ref 24–31)
BUN SERPL-MCNC: 18 MG/DL (ref 8–23)
BUN/CREAT SERPL: 14.5 (ref 7–25)
CALCIUM SPEC-SCNC: 9.3 MG/DL (ref 8.6–10.5)
CHLORIDE SERPL-SCNC: 107 MMOL/L (ref 98–107)
CO2 SERPL-SCNC: 25 MMOL/L (ref 22–29)
CREAT SERPL-MCNC: 1.24 MG/DL (ref 0.76–1.27)
DEPRECATED RDW RBC AUTO: 40.5 FL (ref 37–54)
EGFRCR SERPLBLD CKD-EPI 2021: 61.8 ML/MIN/1.73
ERYTHROCYTE [DISTWIDTH] IN BLOOD BY AUTOMATED COUNT: 12.2 % (ref 12.3–15.4)
GLUCOSE SERPL-MCNC: 92 MG/DL (ref 65–99)
HCT VFR BLD AUTO: 48.2 % (ref 37.5–51)
HGB BLD-MCNC: 16.1 G/DL (ref 13–17.7)
INR PPP: 1.03 (ref 0.93–1.1)
MCH RBC QN AUTO: 30.7 PG (ref 26.6–33)
MCHC RBC AUTO-ENTMCNC: 33.4 G/DL (ref 31.5–35.7)
MCV RBC AUTO: 91.8 FL (ref 79–97)
PLATELET # BLD AUTO: 283 10*3/MM3 (ref 140–450)
PMV BLD AUTO: 9.3 FL (ref 6–12)
POTASSIUM SERPL-SCNC: 4.1 MMOL/L (ref 3.5–5.2)
PROTHROMBIN TIME: 11.2 SECONDS (ref 9.6–11.7)
RBC # BLD AUTO: 5.25 10*6/MM3 (ref 4.14–5.8)
SODIUM SERPL-SCNC: 143 MMOL/L (ref 136–145)
WBC NRBC COR # BLD AUTO: 5.76 10*3/MM3 (ref 3.4–10.8)

## 2024-05-18 PROCEDURE — 85730 THROMBOPLASTIN TIME PARTIAL: CPT

## 2024-05-18 PROCEDURE — 85027 COMPLETE CBC AUTOMATED: CPT

## 2024-05-18 PROCEDURE — 85610 PROTHROMBIN TIME: CPT

## 2024-05-18 PROCEDURE — 36415 COLL VENOUS BLD VENIPUNCTURE: CPT

## 2024-05-18 PROCEDURE — 80048 BASIC METABOLIC PNL TOTAL CA: CPT

## 2024-05-21 ENCOUNTER — HOSPITAL ENCOUNTER (OUTPATIENT)
Facility: HOSPITAL | Age: 74
Setting detail: HOSPITAL OUTPATIENT SURGERY
Discharge: HOME | End: 2024-05-21
Attending: INTERNAL MEDICINE | Admitting: INTERNAL MEDICINE
Payer: MEDICARE

## 2024-05-21 VITALS
OXYGEN SATURATION: 95 % | HEIGHT: 71 IN | SYSTOLIC BLOOD PRESSURE: 138 MMHG | TEMPERATURE: 98.1 F | HEART RATE: 66 BPM | BODY MASS INDEX: 28.36 KG/M2 | WEIGHT: 202.6 LBS | RESPIRATION RATE: 14 BRPM | DIASTOLIC BLOOD PRESSURE: 80 MMHG

## 2024-05-21 DIAGNOSIS — G47.33 OBSTRUCTIVE SLEEP APNEA: ICD-10-CM

## 2024-05-21 DIAGNOSIS — I20.9 ANGINA PECTORIS: ICD-10-CM

## 2024-05-21 DIAGNOSIS — R06.02 SHORTNESS OF BREATH: ICD-10-CM

## 2024-05-21 LAB
ACT BLD: 152 SECONDS (ref 89–137)
ACT BLD: 189 SECONDS (ref 89–137)
QT INTERVAL: 409 MS
QTC INTERVAL: 415 MS

## 2024-05-21 PROCEDURE — C1894 INTRO/SHEATH, NON-LASER: HCPCS

## 2024-05-21 PROCEDURE — 25510000001 IOPAMIDOL PER 1 ML: Performed by: INTERNAL MEDICINE

## 2024-05-21 PROCEDURE — 85347 COAGULATION TIME ACTIVATED: CPT

## 2024-05-21 PROCEDURE — C1887 CATHETER, GUIDING: HCPCS | Performed by: INTERNAL MEDICINE

## 2024-05-21 PROCEDURE — C1725 CATH, TRANSLUMIN NON-LASER: HCPCS | Performed by: INTERNAL MEDICINE

## 2024-05-21 PROCEDURE — C1894 INTRO/SHEATH, NON-LASER: HCPCS | Performed by: INTERNAL MEDICINE

## 2024-05-21 PROCEDURE — 93458 L HRT ARTERY/VENTRICLE ANGIO: CPT | Performed by: INTERNAL MEDICINE

## 2024-05-21 PROCEDURE — 25010000002 MIDAZOLAM PER 1 MG: Performed by: INTERNAL MEDICINE

## 2024-05-21 PROCEDURE — 25010000002 HEPARIN (PORCINE) PER 1000 UNITS: Performed by: INTERNAL MEDICINE

## 2024-05-21 PROCEDURE — 93005 ELECTROCARDIOGRAM TRACING: CPT | Performed by: INTERNAL MEDICINE

## 2024-05-21 PROCEDURE — 25010000002 FENTANYL CITRATE (PF) 100 MCG/2ML SOLUTION: Performed by: INTERNAL MEDICINE

## 2024-05-21 PROCEDURE — C1769 GUIDE WIRE: HCPCS | Performed by: INTERNAL MEDICINE

## 2024-05-21 PROCEDURE — 92920 PRQ TRLUML C ANGIOP 1ART&/BR: CPT | Performed by: INTERNAL MEDICINE

## 2024-05-21 PROCEDURE — 25810000003 SODIUM CHLORIDE 0.9 % SOLUTION: Performed by: INTERNAL MEDICINE

## 2024-05-21 PROCEDURE — 25010000002 LIDOCAINE 1 % SOLUTION: Performed by: INTERNAL MEDICINE

## 2024-05-21 RX ORDER — ACETAMINOPHEN 325 MG/1
650 TABLET ORAL EVERY 4 HOURS PRN
Status: DISCONTINUED | OUTPATIENT
Start: 2024-05-21 | End: 2024-05-21 | Stop reason: HOSPADM

## 2024-05-21 RX ORDER — AMLODIPINE BESYLATE 2.5 MG/1
2.5 TABLET ORAL DAILY
Qty: 90 TABLET | Refills: 3 | Status: SHIPPED | OUTPATIENT
Start: 2024-05-21

## 2024-05-21 RX ORDER — LIDOCAINE HYDROCHLORIDE 10 MG/ML
INJECTION, SOLUTION INFILTRATION; PERINEURAL
Status: DISCONTINUED | OUTPATIENT
Start: 2024-05-21 | End: 2024-05-21 | Stop reason: HOSPADM

## 2024-05-21 RX ORDER — ONDANSETRON 2 MG/ML
4 INJECTION INTRAMUSCULAR; INTRAVENOUS EVERY 6 HOURS PRN
Status: DISCONTINUED | OUTPATIENT
Start: 2024-05-21 | End: 2024-05-21 | Stop reason: HOSPADM

## 2024-05-21 RX ORDER — DIPHENHYDRAMINE HCL 25 MG
25 CAPSULE ORAL EVERY 6 HOURS PRN
Status: DISCONTINUED | OUTPATIENT
Start: 2024-05-21 | End: 2024-05-21 | Stop reason: HOSPADM

## 2024-05-21 RX ORDER — FENTANYL CITRATE 50 UG/ML
INJECTION, SOLUTION INTRAMUSCULAR; INTRAVENOUS
Status: DISCONTINUED | OUTPATIENT
Start: 2024-05-21 | End: 2024-05-21 | Stop reason: HOSPADM

## 2024-05-21 RX ORDER — NITROGLYCERIN 0.4 MG/1
0.4 TABLET SUBLINGUAL
Status: DISCONTINUED | OUTPATIENT
Start: 2024-05-21 | End: 2024-05-21 | Stop reason: HOSPADM

## 2024-05-21 RX ORDER — HEPARIN SODIUM 1000 [USP'U]/ML
INJECTION, SOLUTION INTRAVENOUS; SUBCUTANEOUS
Status: DISCONTINUED | OUTPATIENT
Start: 2024-05-21 | End: 2024-05-21 | Stop reason: HOSPADM

## 2024-05-21 RX ORDER — MIDAZOLAM HYDROCHLORIDE 1 MG/ML
INJECTION INTRAMUSCULAR; INTRAVENOUS
Status: DISCONTINUED | OUTPATIENT
Start: 2024-05-21 | End: 2024-05-21 | Stop reason: HOSPADM

## 2024-05-21 RX ORDER — SODIUM CHLORIDE 9 MG/ML
INJECTION, SOLUTION INTRAVENOUS
Status: COMPLETED | OUTPATIENT
Start: 2024-05-21 | End: 2024-05-21

## 2024-05-21 RX ORDER — CLOPIDOGREL 300 MG/1
300 TABLET, FILM COATED ORAL ONCE
Status: COMPLETED | OUTPATIENT
Start: 2024-05-21 | End: 2024-05-21

## 2024-05-21 RX ORDER — ONDANSETRON 4 MG/1
4 TABLET, ORALLY DISINTEGRATING ORAL EVERY 6 HOURS PRN
Status: DISCONTINUED | OUTPATIENT
Start: 2024-05-21 | End: 2024-05-21 | Stop reason: HOSPADM

## 2024-05-21 RX ORDER — ALUMINA, MAGNESIA, AND SIMETHICONE 2400; 2400; 240 MG/30ML; MG/30ML; MG/30ML
15 SUSPENSION ORAL EVERY 6 HOURS PRN
Status: DISCONTINUED | OUTPATIENT
Start: 2024-05-21 | End: 2024-05-21 | Stop reason: HOSPADM

## 2024-05-21 RX ORDER — CLOPIDOGREL BISULFATE 75 MG/1
75 TABLET ORAL DAILY
Status: DISCONTINUED | OUTPATIENT
Start: 2024-05-22 | End: 2024-05-21 | Stop reason: HOSPADM

## 2024-05-21 RX ORDER — ASPIRIN 325 MG
325 TABLET ORAL ONCE
Status: COMPLETED | OUTPATIENT
Start: 2024-05-21 | End: 2024-05-21

## 2024-05-21 RX ORDER — SODIUM CHLORIDE 9 MG/ML
250 INJECTION, SOLUTION INTRAVENOUS ONCE AS NEEDED
Status: DISCONTINUED | OUTPATIENT
Start: 2024-05-21 | End: 2024-05-21 | Stop reason: HOSPADM

## 2024-05-21 RX ADMIN — ASPIRIN 325 MG ORAL TABLET 325 MG: 325 PILL ORAL at 15:32

## 2024-05-21 RX ADMIN — CLOPIDOGREL BISULFATE 300 MG: 300 TABLET, FILM COATED ORAL at 15:32

## 2024-05-21 NOTE — DISCHARGE INSTRUCTIONS
Post Cath Instructions      Call Dr. Bertrand’s office to schedule a follow up appointment in 4 weeks at [phone].  Specific Physician Instructions:     Drink plenty of fluids for the next 24 hours.  This helps to eliminate the dye used in your procedure through urination.  You may resume a normal diet; however, try to avoid foods that would cause gas or constipation.    Sedative medication given to you during your catheterization may decrease your judgement and reaction time for up to 24-48 hours.  Therefore:  DO NOT drive or operate hazardous machinery (48 hours)  DO NOT consume alcoholic beverages  DO NOT make any important/legal decisions  Have someone stay with you for at least 24 hours    To allow proper healing and prevent bleeding, the following activities are to be strictly avoided for the next 24-48 hours:  Excessive bending at wound site  Straining (anything that would tense up muscles around the affected puncture site)  Lifting objects greater than 5 pounds, pushing, or pulling for 5 days  For Groin Cases:  Refrain from sexual activity  Refrain from running or vigorous walking  No prolonged sitting or standing  Limit stair climbing as much as possible    Keep the puncture site clean and dry.  You may remove the dressing tomorrow and replace it with a band-aid for at least one additional day.  Gently clean the site with mild soap and water.  No scrubbing/rubbing and lightly pat the area dry.  Showers are acceptable; however, avoid submerging in water (tub baths, hot tubs, swimming pools, dishwater, etc…) for at least one week.  The site should be completely healed before resuming these activities to reduce the risk of infection.  Check the site often.  Watch for signs and symptoms of infection and notify your physician if any of the following occur:  Bleeding or an increase in swelling at the puncture site  Fever  Increased soreness around puncture site  Foul odor or significant drainage from the puncture  site  Swelling, redness, or warmth at the puncture site    **A bruise or small “pea sized” lump under the skin at the puncture site is not unusual.  This should disappear within 3-4 weeks.**  CONTACT YOUR PHYSICIAN OR CALL 911 IF YOU EXPERIENCE ANY OF THE FOLLOWING:  Increased angina (chest pain) or frequent sensations of pressure, burning, pain, or other discomfort in the chest, arm, jaws, or stomach  Lightheadedness, dizziness, faint feeling, sweating, or difficulty breathing  Odd sensation changes like numbness, tingling, coldness, or pain in the arm or leg in which the catheter was inserted  Limb in which the catheter was inserted becomes pale/bluish in color    IMPORTANT:  Although this occurs very rarely, if you should develop bright red or excessive bleeding, feel a “pop” inside at the insertion site, or notice a sudden increase in swelling larger than a walnut, you should call 911.  Hold continuous firm pressure to the access site until emergency personnel arrive.  It is best if someone else can do this for you.

## 2024-05-23 ENCOUNTER — HOSPITAL ENCOUNTER (OUTPATIENT)
Dept: CARDIOLOGY | Facility: HOSPITAL | Age: 74
Discharge: HOME | End: 2024-05-23
Payer: MEDICARE

## 2024-05-23 DIAGNOSIS — I49.1 PREMATURE ATRIAL COMPLEX: ICD-10-CM

## 2024-05-23 DIAGNOSIS — R06.02 EXERTIONAL SHORTNESS OF BREATH: ICD-10-CM

## 2024-05-23 PROCEDURE — 93226 XTRNL ECG REC<48 HR SCAN A/R: CPT

## 2024-05-23 PROCEDURE — 93225 XTRNL ECG REC<48 HRS REC: CPT

## 2024-05-24 ENCOUNTER — OFFICE VISIT (OUTPATIENT)
Dept: FAMILY MEDICINE CLINIC | Facility: CLINIC | Age: 74
End: 2024-05-24
Payer: MEDICARE

## 2024-05-24 VITALS
WEIGHT: 204.6 LBS | SYSTOLIC BLOOD PRESSURE: 143 MMHG | OXYGEN SATURATION: 96 % | HEART RATE: 66 BPM | HEIGHT: 71 IN | DIASTOLIC BLOOD PRESSURE: 57 MMHG | RESPIRATION RATE: 16 BRPM | TEMPERATURE: 97.8 F | BODY MASS INDEX: 28.64 KG/M2

## 2024-05-24 DIAGNOSIS — R06.02 EXERTIONAL SHORTNESS OF BREATH: Primary | ICD-10-CM

## 2024-05-24 DIAGNOSIS — K21.9 GASTROESOPHAGEAL REFLUX DISEASE WITHOUT ESOPHAGITIS: ICD-10-CM

## 2024-05-24 DIAGNOSIS — I25.118 CORONARY ARTERY DISEASE OF NATIVE ARTERY OF NATIVE HEART WITH STABLE ANGINA PECTORIS: ICD-10-CM

## 2024-05-24 PROCEDURE — 3078F DIAST BP <80 MM HG: CPT | Performed by: FAMILY MEDICINE

## 2024-05-24 PROCEDURE — 1126F AMNT PAIN NOTED NONE PRSNT: CPT | Performed by: FAMILY MEDICINE

## 2024-05-24 PROCEDURE — G2211 COMPLEX E/M VISIT ADD ON: HCPCS | Performed by: FAMILY MEDICINE

## 2024-05-24 PROCEDURE — 99214 OFFICE O/P EST MOD 30 MIN: CPT | Performed by: FAMILY MEDICINE

## 2024-05-24 PROCEDURE — 3077F SYST BP >= 140 MM HG: CPT | Performed by: FAMILY MEDICINE

## 2024-05-24 RX ORDER — PANTOPRAZOLE SODIUM 20 MG/1
20 TABLET, DELAYED RELEASE ORAL DAILY
Qty: 90 TABLET | Refills: 0 | Status: SHIPPED | OUTPATIENT
Start: 2024-05-24

## 2024-05-24 RX ORDER — CLOPIDOGREL BISULFATE 75 MG/1
1 TABLET ORAL DAILY
COMMUNITY
Start: 2024-05-21

## 2024-05-24 RX ORDER — ROSUVASTATIN CALCIUM 10 MG/1
10 TABLET, COATED ORAL DAILY
Qty: 90 TABLET | Refills: 1 | Status: SHIPPED | OUTPATIENT
Start: 2024-05-24

## 2024-05-24 NOTE — PROGRESS NOTES
Subjective   Chief Complaint   Patient presents with    Discuss SOA    Coronary Artery Disease     Kingston Mancini is a 73 y.o. male.     Patient Care Team:  Steff Singletary MD as PCP - General (Family Medicine)  Seipel, Joseph F, MD as Consulting Physician (Sleep Medicine)  Mono Moreno MD as Consulting Physician (Otolaryngology)  Akira Reyes MD as Consulting Physician (Gastroenterology)  Jeancarlos Ortega Jr., MD as Consulting Physician (Dermatology)  David Fitzpatrick MD as Consulting Physician (Ophthalmology)    History of Present Illness  He is coming in today with his wife to follow-up on his recent heart cath and also discussed his exertional shortness of breath.  Patient reports that he has been experiencing some exertional shortness of breath for quite some time, at least few or maybe several months.  No chest pains are being reported.  He was recently seen by cardiologist and had a heart cath done which showed high-grade stenosis in LAD.  Attempt for stent was unsuccessful.  Patient was started on Plavix, aspirin 81 mg, amlodipine, and isosorbide.  Patient was advised by cardiologist that the type of the stenosis he is having in the location but is not responsible for his shortness of breath and that his shortness of breath possibly is due to lung issues or may be his deconditioning.  Patient is also on omeprazole 20 mg for the treatment of his chronic acid reflux.  He was diagnosed with sleep apnea at least 6 years ago and has been on CPAP since then, he is followed by Dr. Seipel for that.       The following portions of the patient's history were reviewed and updated as appropriate: allergies, current medications, past family history, past medical history, past social history, past surgical history, and problem list.  Past Medical History:   Diagnosis Date    Cataract Years ago    Cataracts removed from both eyes May, 2022    Erectile dysfunction     History of basal cell cancer 2002     [facility]straction from [facility] Past Medical Hx states Basal Cell on ear    History of cardiac cath     Abstraction From [facility] Heart Cath    History of degenerative disc disease     DDD    History of foreign travel     [place] ; [place] Oct/Nov    HL (hearing loss) 1996    Permanent tinnitus from whiplash injury    Hyperlipidemia     Hypertension     AURELIA on CPAP 2017    AURELIA npsg Dec 2017 ahi 29 auto cpap 7-14 nasal mask    Osteoarthritis     Prostate cancer     [place] score 6, followed by Dr. Infante, age 59     Past Surgical History:   Procedure Laterality Date    CARDIAC CATHETERIZATION N/A 2024    Procedure: Left Heart Cath;  Surgeon: Cleve Bertrand MD;  Location: [facility] CATH INVASIVE LOCATION;  Service: Cardiovascular;  Laterality: N/A;    CYST REMOVAL  2020    EYE SURGERY  May, 2022    Cataracts in both eyes    HAND SURGERY      , 10/93, 2007, 2013, 2013    HERNIA REPAIR      OTHER SURGICAL HISTORY      Abstraction from [facility] Duputryns Contracture    PROSTATECTOMY       The patient has a family history of  Family History   Problem Relation Age of Onset    Breast cancer Mother 67         age 70    Cancer Mother     Heart attack Father     Other Father         Amyloidosis    Heart disease Father     Asthma Sister     Heart attack Maternal Grandmother     Cancer Maternal Grandmother     Kidney cancer Maternal Grandfather     Cancer Paternal Grandmother     Diabetes Paternal Grandfather     Cancer Paternal Grandfather     Cancer Other         Breast Cancer    Diabetes Other     Heart disease Other     Hypertension Other     Diverticulosis Other     Other Other         Amyloidosis     Social History     Socioeconomic History    Marital status:      Spouse name: Melodie    Number of children: 3    Years of education: College   Tobacco Use    Smoking status: Never     Passive exposure: Never    Smokeless tobacco: Never   Vaping Use    Vaping  "status: Never Used   Substance and Sexual Activity    Alcohol use: Yes     Alcohol/week: 6.0 standard drinks of alcohol     Types: 4 Glasses of wine, 2 Cans of beer per week     Comment: 4-5 beer or glass wine weekly    Drug use: Never    Sexual activity: Yes     Partners: Female     Birth control/protection: Surgical, Vasectomy       Review of Systems   Constitutional:  Negative for activity change, fatigue and fever.   Respiratory:  Positive for shortness of breath. Negative for wheezing.    Cardiovascular:  Negative for chest pain, palpitations and leg swelling.   Musculoskeletal:  Negative for arthralgias and back pain.   Skin:  Negative for rash.   Neurological:  Negative for tremors and headache.     Visit Vitals  /57 (BP Location: Right arm, Patient Position: Sitting, Cuff Size: Adult)   Pulse 66   Temp 97.8 °F (36.6 °C) (Infrared)   Resp 16   Ht 180.3 cm (71\")   Wt 92.8 kg (204 lb 9.6 oz)   SpO2 96%   BMI 28.54 kg/m²       BMI is >= 25 and <30. (Overweight) The following options were offered after discussion;: exercise counseling/recommendations      Current Outpatient Medications:     amLODIPine (NORVASC) 2.5 MG tablet, Take 1 tablet by mouth Daily., Disp: 90 tablet, Rfl: 3    aspirin 81 MG EC tablet, Take 1 tablet by mouth Daily., Disp: 100 tablet, Rfl: 1    Black Pepper-Turmeric (TURMERIC COMPLEX/BLACK PEPPER PO), Take 1 capsule by mouth Daily., Disp: , Rfl:     Cholecalciferol (VITAMIN D3) 2000 units tablet, VITAMIN D3 2000 UNIT TABS, Disp: , Rfl:     clopidogrel (PLAVIX) 75 MG tablet, Take 1 tablet by mouth Daily., Disp: , Rfl:     isosorbide mononitrate (IMDUR) 30 MG 24 hr tablet, Take 1 tablet by mouth Daily., Disp: 90 tablet, Rfl: 3    loratadine (CLARITIN) 10 MG tablet, Take 1 tablet by mouth Daily., Disp: , Rfl:     Multiple Vitamins-Minerals (ICAPS AREDS 2 PO), , Disp: , Rfl:     Probiotic Product (PROBIOTIC-10 PO), Take 1 tablet by mouth Daily., Disp: , Rfl:     pantoprazole (Protonix) 20 " MG EC tablet, Take 1 tablet by mouth Daily., Disp: 90 tablet, Rfl: 0    rosuvastatin (Crestor) 10 MG tablet, Take 1 tablet by mouth Daily., Disp: 90 tablet, Rfl: 1    Objective   Physical Exam  Vitals and nursing note reviewed.   Constitutional:       General: He is not in acute distress.     Appearance: Normal appearance. He is well-developed. He is not ill-appearing or diaphoretic.      Comments: Patient is in no distress, patient has normal voice and speech.  Normal respiratory effort.   HENT:      Head: Normocephalic and atraumatic.   Cardiovascular:      Rate and Rhythm: Normal rate and regular rhythm.      Heart sounds: Normal heart sounds. No murmur heard.     No gallop.   Pulmonary:      Effort: Pulmonary effort is normal. No respiratory distress.      Breath sounds: Normal breath sounds. No wheezing, rhonchi or rales.   Chest:      Chest wall: No tenderness.   Musculoskeletal:      Cervical back: Normal range of motion and neck supple.   Neurological:      General: No focal deficit present.      Mental Status: He is alert and oriented to person, place, and time. Mental status is at baseline.   Psychiatric:         Mood and Affect: Mood normal.         XR Chest 2 View    Result Date: 5/3/2024  Impression: Impression: No acute cardiopulmonary finding. Electronically Signed: Adina Reyes MD  5/3/2024 2:40 PM EDT  Workstation ID: [id number]     FOLLOWING LABS WERE REVIEWED TODAY:  CBC          5/1/2024    14:29 5/18/2024    07:16   CBC   WBC 7.32  5.76    RBC 5.13  5.25    Hemoglobin 16.0  16.1    Hematocrit 46.8  48.2    MCV 91.2  91.8    MCH 31.2  30.7    MCHC 34.2  33.4    RDW 12.0  12.2    Platelets 263  283         TSH          12/11/2023    08:49 5/1/2024    14:29   TSH   TSH 1.230  0.194         CMP          12/11/2023    08:49 5/1/2024    14:29 5/18/2024    07:16   CMP   Glucose 85  97  92    BUN 22  26  18    Creatinine 1.09  1.06  1.24    EGFR 72.1  74.6  61.8    Sodium 139  140  143    Potassium 4.0   4.1  4.1    Chloride 104  106  107    Calcium 9.8  9.6  9.3    Total Protein 6.9      Albumin 4.8      Globulin 2.1      Total Bilirubin 0.7      Alkaline Phosphatase 67      AST (SGOT) 22      ALT (SGPT) 22      Albumin/Globulin Ratio 2.3      BUN/Creatinine Ratio 20.2  24.5  14.5    Anion Gap 10.0  11.0  11.0      Lipid Panel          12/11/2023    08:49   Lipid Panel   Total Cholesterol 158    Triglycerides 122    HDL Cholesterol 44    VLDL Cholesterol 22    LDL Cholesterol  92    LDL/HDL Ratio 2.04          Assessment & Plan   Diagnoses and all orders for this visit:    1. Exertional shortness of breath (Primary)  -     Complete PFT - Pre & Post Bronchodilator; Future  -     Ambulatory Referral to Pulmonology    2. Coronary artery disease of native artery of native heart with stable angina pectoris  -     rosuvastatin (Crestor) 10 MG tablet; Take 1 tablet by mouth Daily.  Dispense: 90 tablet; Refill: 1    3. Gastroesophageal reflux disease without esophagitis  -     pantoprazole (Protonix) 20 MG EC tablet; Take 1 tablet by mouth Daily.  Dispense: 90 tablet; Refill: 0      I reviewed his symptoms and concerns.  I also reviewed his recent heart catheter report which shows high-grade stenosis in a small caliber vessel of the medial branch of LAD, unsuccessful balloon angioplasty.  Patient was started on amlodipine, Plavix, aspirin 81 mg, and isosorbide by her cardiologist.  I will be also adding a small dose of statin due to no established CAD to decrease cardiovascular risk down the road.  I will be also switching his omeprazole to pantoprazole due to possible interaction of omeprazole with Plavix.  I will be getting PFT and asking a pulmonologist for opinion due to his ongoing exertional shortness of breath.  All questions were answered today to my best knowledge and the patient's satisfaction and understanding.      Return in about 3 months (around 8/24/2024) for Next scheduled follow up.    Requested  Prescriptions     Signed Prescriptions Disp Refills    rosuvastatin (Crestor) 10 MG tablet 90 tablet 1     Sig: Take 1 tablet by mouth Daily.    pantoprazole (Protonix) 20 MG EC tablet 90 tablet 0     Sig: Take 1 tablet by mouth Daily.       Steff Singletary MD  05/24/2024

## 2024-05-28 ENCOUNTER — HOSPITAL ENCOUNTER (OUTPATIENT)
Dept: RESPIRATORY THERAPY | Facility: HOSPITAL | Age: 74
Discharge: HOME | End: 2024-05-28
Admitting: FAMILY MEDICINE
Payer: MEDICARE

## 2024-05-28 VITALS — HEART RATE: 78 BPM | OXYGEN SATURATION: 95 % | RESPIRATION RATE: 17 BRPM

## 2024-05-28 DIAGNOSIS — R06.02 EXERTIONAL SHORTNESS OF BREATH: ICD-10-CM

## 2024-05-28 PROCEDURE — 94799 UNLISTED PULMONARY SVC/PX: CPT

## 2024-05-28 PROCEDURE — 94727 GAS DIL/WSHOT DETER LNG VOL: CPT

## 2024-05-28 PROCEDURE — 63710000001 ALBUTEROL SULFATE HFA 108 (90 BASE) MCG/ACT AEROSOL SOLUTION 6.7 G INHALER: Performed by: FAMILY MEDICINE

## 2024-05-28 PROCEDURE — 94060 EVALUATION OF WHEEZING: CPT

## 2024-05-28 PROCEDURE — 94729 DIFFUSING CAPACITY: CPT

## 2024-05-28 PROCEDURE — A9270 NON-COVERED ITEM OR SERVICE: HCPCS | Performed by: FAMILY MEDICINE

## 2024-05-28 PROCEDURE — 94664 DEMO&/EVAL PT USE INHALER: CPT

## 2024-05-28 RX ORDER — ALBUTEROL SULFATE 90 UG/1
2 AEROSOL, METERED RESPIRATORY (INHALATION) ONCE
Status: COMPLETED | OUTPATIENT
Start: 2024-05-28 | End: 2024-05-28

## 2024-05-28 RX ADMIN — ALBUTEROL SULFATE 2 PUFF: 108 AEROSOL, METERED RESPIRATORY (INHALATION) at 11:58

## 2024-06-04 LAB
QT INTERVAL: 409 MS
QTC INTERVAL: 415 MS

## 2024-06-11 ENCOUNTER — OFFICE VISIT (OUTPATIENT)
Dept: FAMILY MEDICINE CLINIC | Facility: CLINIC | Age: 74
End: 2024-06-11
Payer: MEDICARE

## 2024-06-11 VITALS
TEMPERATURE: 97.5 F | HEART RATE: 82 BPM | SYSTOLIC BLOOD PRESSURE: 106 MMHG | WEIGHT: 203 LBS | RESPIRATION RATE: 16 BRPM | HEIGHT: 71 IN | OXYGEN SATURATION: 94 % | BODY MASS INDEX: 28.42 KG/M2 | DIASTOLIC BLOOD PRESSURE: 73 MMHG

## 2024-06-11 DIAGNOSIS — S02.2XXA CLOSED FRACTURE OF NASAL BONE, INITIAL ENCOUNTER: Primary | ICD-10-CM

## 2024-06-11 DIAGNOSIS — M25.532 LEFT WRIST PAIN: ICD-10-CM

## 2024-06-11 PROCEDURE — 3074F SYST BP LT 130 MM HG: CPT | Performed by: FAMILY MEDICINE

## 2024-06-11 PROCEDURE — 3078F DIAST BP <80 MM HG: CPT | Performed by: FAMILY MEDICINE

## 2024-06-11 PROCEDURE — 1126F AMNT PAIN NOTED NONE PRSNT: CPT | Performed by: FAMILY MEDICINE

## 2024-06-11 PROCEDURE — 99213 OFFICE O/P EST LOW 20 MIN: CPT | Performed by: FAMILY MEDICINE

## 2024-06-11 RX ORDER — AZELASTINE HYDROCHLORIDE 137 UG/1
SPRAY, METERED NASAL
COMMUNITY
Start: 2024-05-30

## 2024-06-11 NOTE — PROGRESS NOTES
Subjective   Chief Complaint   Patient presents with    fall down stairs     06/08/2024 in [place]     Kingston Mancini is a 73 y.o. male.     Patient Care Team:  Steff Singletary MD as PCP - General (Family Medicine)  Seipel, Joseph F, MD as Consulting Physician (Sleep Medicine)  Mono Moreno MD as Consulting Physician (Otolaryngology)  Akira Reyes MD as Consulting Physician (Gastroenterology)  Jeancarlos Ortega Jr., MD as Consulting Physician (Dermatology)  David Fitzpatrick MD as Consulting Physician (Ophthalmology)    History of Present Illness  He is coming in today with his wife to follow-up on his recent fall and the ER visit.  They were in the [facility] when he tripped and fell down stairs/13 steps.  He was subsequently evaluated in the ER and multiple imaging was done including CTs and x-rays.  He sustained a fracture of the nasal bone and he is scheduled to see his ENT tomorrow.  He also has a brace on the left wrist due to some pain, the x-ray however did not show the fracture initially.  He has been taking Tylenol over-the-counter as needed for pain and this is helping.  Patient sustained a big bruise on his face.       The following portions of the patient's history were reviewed and updated as appropriate: allergies, current medications, past family history, past medical history, past social history, past surgical history, and problem list.  Past Medical History:   Diagnosis Date    Cataract Years ago    Cataracts removed from both eyes May, 2022    Erectile dysfunction     History of basal cell cancer 2002    [facility] Past Medical Hx states Basal Cell on ear    History of cardiac cath 2006    [facility]    History of degenerative disc disease     DDD    History of foreign travel     [place] July/August; [place] Oct/Nov    HL (hearing loss) Jan 1996    Permanent tinnitus from whiplash injury    Hyperlipidemia     Hypertension      AURELIA on CPAP 2017    AURELIA npsg Dec 2017 ahi 29 auto cpap 7-14 nasal mask    Osteoarthritis     Prostate cancer     [place] score 6, followed by Dr. Infante, age 59     Past Surgical History:   Procedure Laterality Date    CARDIAC CATHETERIZATION N/A 2024    Procedure: Left Heart Cath;  Surgeon: Cleve Bertrand MD;  Location: [facility] CATH INVASIVE LOCATION;  Service: Cardiovascular;  Laterality: N/A;    CYST REMOVAL  2020    EYE SURGERY  May, 2022    Cataracts in both eyes    HAND SURGERY      , 10/93, 2007, 2013, 2013    HERNIA REPAIR      OTHER SURGICAL HISTORY      Abstraction from [facility] Duputryns Contracture    PROSTATECTOMY       The patient has a family history of  Family History   Problem Relation Age of Onset    Breast cancer Mother 67         age 70    Cancer Mother     Heart attack Father     Other Father         Amyloidosis    Heart disease Father     Asthma Sister     Heart attack Maternal Grandmother     Cancer Maternal Grandmother     Kidney cancer Maternal Grandfather     Cancer Paternal Grandmother     Diabetes Paternal Grandfather     Cancer Paternal Grandfather     Cancer Other         Breast Cancer    Diabetes Other     Heart disease Other     Hypertension Other     Diverticulosis Other     Other Other         Amyloidosis     Social History     Socioeconomic History    Marital status:      Spouse name: Melodie    Number of children: 3    Years of education: College   Tobacco Use    Smoking status: Never     Passive exposure: Never    Smokeless tobacco: Never   Vaping Use    Vaping status: Never Used   Substance and Sexual Activity    Alcohol use: Yes     Alcohol/week: 6.0 standard drinks of alcohol     Types: 4 Glasses of wine, 2 Cans of beer per week     Comment: 4-5 beer or glass wine weekly    Drug use: Never    Sexual activity: Yes     Partners: Female     Birth control/protection: Surgical, Vasectomy       Review of Systems   Musculoskeletal:   "Negative for arthralgias, back pain, gait problem, joint swelling and neck pain.   Skin:  Positive for bruise.   Neurological:  Positive for headache. Negative for weakness, numbness and memory problem.     Visit Vitals  /73 (BP Location: Right arm, Patient Position: Sitting, Cuff Size: Adult)   Pulse 82   Temp 97.5 °F (36.4 °C) (Infrared)   Resp 16   Ht 180.3 cm (70.98\")   Wt 92.1 kg (203 lb)   SpO2 94%   BMI 28.33 kg/m²              Current Outpatient Medications:     Azelastine HCl 137 MCG/SPRAY solution, SPRAY 2 SPRAYS INTO EACH NOSTRIL TWICE A DAY, Disp: , Rfl:     amLODIPine (NORVASC) 2.5 MG tablet, Take 1 tablet by mouth Daily., Disp: 90 tablet, Rfl: 3    aspirin 81 MG EC tablet, Take 1 tablet by mouth Daily., Disp: 100 tablet, Rfl: 1    Black Pepper-Turmeric (TURMERIC COMPLEX/BLACK PEPPER PO), Take 1 capsule by mouth Daily., Disp: , Rfl:     Cholecalciferol (VITAMIN D3) 2000 units tablet, VITAMIN D3 2000 UNIT TABS, Disp: , Rfl:     clopidogrel (PLAVIX) 75 MG tablet, Take 1 tablet by mouth Daily., Disp: , Rfl:     isosorbide mononitrate (IMDUR) 30 MG 24 hr tablet, Take 1 tablet by mouth Daily., Disp: 90 tablet, Rfl: 3    loratadine (CLARITIN) 10 MG tablet, Take 1 tablet by mouth Daily., Disp: , Rfl:     Multiple Vitamins-Minerals (ICAPS AREDS 2 PO), , Disp: , Rfl:     pantoprazole (Protonix) 20 MG EC tablet, Take 1 tablet by mouth Daily., Disp: 90 tablet, Rfl: 0    Probiotic Product (PROBIOTIC-10 PO), Take 1 tablet by mouth Daily., Disp: , Rfl:     rosuvastatin (Crestor) 10 MG tablet, Take 1 tablet by mouth Daily., Disp: 90 tablet, Rfl: 1    Objective   Physical Exam  Constitutional:       General: He is not in acute distress.     Appearance: Normal appearance. He is well-developed. He is not ill-appearing or diaphoretic.      Comments: Patient is in no distress, patient has normal voice and speech.  Normal respiratory effort.   HENT:      Head: Normocephalic and atraumatic.   Pulmonary:      Effort: " Pulmonary effort is normal.   Musculoskeletal:      Cervical back: Normal range of motion and neck supple.      Comments: Deformity of the nose with deviation to the right is noted.  There is a big bruise covering the bridge of the nose and also beneath both eyes spreading into the cheeks.  The left wrist was examined, there is no obvious deformity or swelling, no palpation tenderness, full range of motion.   Neurological:      General: No focal deficit present.      Mental Status: He is alert and oriented to person, place, and time. Mental status is at baseline.      Cranial Nerves: No cranial nerve deficit.      Sensory: No sensory deficit.      Motor: No weakness.      Coordination: Coordination normal.   Psychiatric:         Mood and Affect: Mood normal.           Assessment & Plan   Diagnoses and all orders for this visit:    1. Closed fracture of nasal bone, initial encounter (Primary)    2. Left wrist pain  -     XR Wrist 3+ View Left; Future      I reviewed his available imaging reports which patient provided today and which were done in the ER including CT of the head, CT of the cervical spine and x-ray of the left wrist.  The copies of the reports are scanned in the media section of the patient's chart.  Patient is scheduled to see his ENT tomorrow to further address his nasal bone fracture.  I will be repeating the x-ray of the left wrist to rule out fractures.  In meantime patient will continue to wear the brace.  He may continue to use Tylenol over-the-counter as needed for pain.  Fall prevention was discussed and stressed.        Return if symptoms worsen or fail to improve, for Recheck.    Requested Prescriptions      No prescriptions requested or ordered in this encounter       Steff Singletary MD  06/11/2024

## 2024-06-18 ENCOUNTER — HOSPITAL ENCOUNTER (OUTPATIENT)
Dept: GENERAL RADIOLOGY | Facility: HOSPITAL | Age: 74
Discharge: HOME | End: 2024-06-18
Admitting: FAMILY MEDICINE
Payer: MEDICARE

## 2024-06-18 DIAGNOSIS — M25.532 LEFT WRIST PAIN: ICD-10-CM

## 2024-06-18 PROCEDURE — 73110 X-RAY EXAM OF WRIST: CPT

## 2024-07-02 ENCOUNTER — OFFICE VISIT (OUTPATIENT)
Dept: CARDIOLOGY | Facility: CLINIC | Age: 74
End: 2024-07-02
Payer: MEDICARE

## 2024-07-02 VITALS
RESPIRATION RATE: 18 BRPM | HEART RATE: 97 BPM | HEIGHT: 71 IN | WEIGHT: 198.2 LBS | SYSTOLIC BLOOD PRESSURE: 110 MMHG | OXYGEN SATURATION: 96 % | BODY MASS INDEX: 27.75 KG/M2 | DIASTOLIC BLOOD PRESSURE: 73 MMHG

## 2024-07-02 DIAGNOSIS — I25.10 CORONARY ARTERY DISEASE INVOLVING NATIVE CORONARY ARTERY OF NATIVE HEART WITHOUT ANGINA PECTORIS: ICD-10-CM

## 2024-07-02 DIAGNOSIS — E78.2 MIXED HYPERLIPIDEMIA: ICD-10-CM

## 2024-07-02 DIAGNOSIS — I48.0 PAROXYSMAL ATRIAL FIBRILLATION: Primary | ICD-10-CM

## 2024-07-02 PROCEDURE — 3078F DIAST BP <80 MM HG: CPT | Performed by: NURSE PRACTITIONER

## 2024-07-02 PROCEDURE — 1160F RVW MEDS BY RX/DR IN RCRD: CPT | Performed by: NURSE PRACTITIONER

## 2024-07-02 PROCEDURE — 99214 OFFICE O/P EST MOD 30 MIN: CPT | Performed by: NURSE PRACTITIONER

## 2024-07-02 PROCEDURE — 3074F SYST BP LT 130 MM HG: CPT | Performed by: NURSE PRACTITIONER

## 2024-07-02 PROCEDURE — 1159F MED LIST DOCD IN RCRD: CPT | Performed by: NURSE PRACTITIONER

## 2024-07-02 PROCEDURE — 93000 ELECTROCARDIOGRAM COMPLETE: CPT | Performed by: NURSE PRACTITIONER

## 2024-07-02 NOTE — PROGRESS NOTES
Cardiology Office Follow Up Visit      Primary Care Provider:  Steff Singletary MD    Reason for f/u:     Hospital follow-up  Coronary artery disease  Dyslipidemia      Subjective     CC:    Patient denies chest pain or dyspnea at present.    History of Present Illness       Kingston Mancini is a 73 y.o. male.  Patient is a very pleasant 73-year-old male who was recently admitted to [facility].  He underwent cardiac catheterization due to periods of chest pain.    Cardiac catheterization demonstrated left main normal, LAD had diffuse disease of the proximal and mid LAD up to 25%.  The diagonal branch of the LAD was a medium size vessel that divides into medial and lateral branches.  The medial branch had 90 to 95% stenosis.  Unsuccessful attempt at PCI was made.  The balloon was not able to cross the lesion.  Left circumflex had mild disease of the proximal and mid left circumflex but no high-grade stenosis.  RCA had 25% plaquing in the proximal mid segment.  There is calcification and 25 to 30% stenosis in the mid to distal segment..    Patient was started on medical treatment with dual antiplatelet therapy, nitrates and amlodipine.    Patient had previous echocardiogram that showed his ejection fraction to be 60 to 65% there were no significant valvular flow abnormalities only mild mitral regurgitation.    The patient reports since his hospital discharge he sustained a fall down a flight of stairs while he was in [place].  He broke his nose.  He anticipates having to come to have upcoming nasal surgery for this fracture.  In addition he has planned surgery for blepharoplasty as well as surgery on his hand.    He has had no recurrent chest pain since his hospital discharge.  He denies shortness of breath and says he likes to play golf.              ASSESSMENT/PLAN:      Diagnoses and all orders for this visit:    1. Paroxysmal atrial fibrillation (Primary)  -     Holter Monitor - 24 Hour; Future    2.  Coronary artery disease involving native coronary artery of native heart without angina pectoris    3. Mixed hyperlipidemia            MEDICAL DECISION MAKING:    Patient is here for hospital follow-up after his catheterization.  He is not having any symptoms of angina.    Incidentally the patient appears to be in atrial fibrillation today.  He has not had documented atrial fibrillation in the past but does report being told on several occasions that his heartbeat is irregular.  His ventricular rate is 97.    The patient's [id number] score is 2.  He does have some contraindication to anticoagulation at this point due to upcoming anticipated nasal surgery as well as blepharoplasty in recent fall.    I have placed a 24-hour Holter monitor to see if his atrial fibrillation is persistent or paroxysmal and what his rates are throughout the 24-hour.    Would consider stopping amlodipine and replacing it with low-dose beta-blocker after the Holter read is reviewed.    In addition we will discuss anticoagulation with Dr. Bertrand in light of his recent fall and upcoming surgeries.    I told the patient we would contact him after his Holter is reviewed and after have discussed his case with Dr. Bertrand        Past Medical History:   Diagnosis Date    Abnormal ECG     Cataract Years ago    Cataracts removed from both eyes May, 2022    Erectile dysfunction     History of basal cell cancer 2002    Abstraction from [facility] Past Medical Hx states Basal Cell on ear    History of cardiac cath 2006    [facility]    History of degenerative disc disease     DDD    History of foreign travel     [place] July/August; [place] Oct/Nov    HL (hearing loss) Jan 1996    Permanent tinnitus from whiplash injury    Hyperlipidemia     Hypertension     AURELIA on CPAP 12/2017    AURELIA npsg Dec 2017 ahi 29 auto cpap 7-14 nasal mask    Osteoarthritis     PAF (paroxysmal atrial fibrillation) 7/2/2024    Prostate cancer 2011     [place] score 6, followed by Dr. Infante, age 59       Past Surgical History:   Procedure Laterality Date    CARDIAC CATHETERIZATION N/A 05/21/2024    Procedure: Left Heart Cath;  Surgeon: Cleve Bertrand MD;  Location: [facility] CATH INVASIVE LOCATION;  Service: Cardiovascular;  Laterality: N/A;    CYST REMOVAL  01/09/2020    EYE SURGERY  May, 2022    Cataracts in both eyes    HAND SURGERY      4/93, 10/93, 9/2007, 4/2013, 7/2013    HERNIA REPAIR  2002    OTHER SURGICAL HISTORY      Abstraction from [facility] [employer]    PROSTATECTOMY  2011         Current Outpatient Medications:     amLODIPine (NORVASC) 2.5 MG tablet, Take 1 tablet by mouth Daily., Disp: 90 tablet, Rfl: 3    aspirin 81 MG EC tablet, Take 1 tablet by mouth Daily., Disp: 100 tablet, Rfl: 1    Black Pepper-Turmeric (TURMERIC COMPLEX/BLACK PEPPER PO), Take 1 capsule by mouth Daily., Disp: , Rfl:     Cholecalciferol (VITAMIN D3) 2000 units tablet, VITAMIN D3 2000 UNIT TABS, Disp: , Rfl:     clopidogrel (PLAVIX) 75 MG tablet, Take 1 tablet by mouth Daily., Disp: , Rfl:     isosorbide mononitrate (IMDUR) 30 MG 24 hr tablet, Take 1 tablet by mouth Daily., Disp: 90 tablet, Rfl: 3    loratadine (CLARITIN) 10 MG tablet, Take 1 tablet by mouth Daily., Disp: , Rfl:     Multiple Vitamins-Minerals (ICAPS AREDS 2 PO), , Disp: , Rfl:     pantoprazole (Protonix) 20 MG EC tablet, Take 1 tablet by mouth Daily., Disp: 90 tablet, Rfl: 0    Probiotic Product (PROBIOTIC-10 PO), Take 1 tablet by mouth Daily., Disp: , Rfl:     Azelastine HCl 137 MCG/SPRAY solution, SPRAY 2 SPRAYS INTO EACH NOSTRIL TWICE A DAY (Patient not taking: Reported on 7/2/2024), Disp: , Rfl:     rosuvastatin (Crestor) 10 MG tablet, Take 1 tablet by mouth Daily. (Patient not taking: Reported on 7/2/2024), Disp: 90 tablet, Rfl: 1    Social History     Socioeconomic History    Marital status:      Spouse name: Melodie    Number of children: 3    Years of education: College   Tobacco Use     "Smoking status: Never     Passive exposure: Never    Smokeless tobacco: Never   Vaping Use    Vaping status: Never Used   Substance and Sexual Activity    Alcohol use: Yes     Alcohol/week: 6.0 standard drinks of alcohol     Types: 4 Glasses of wine, 2 Cans of beer per week     Comment: 4-5 beer or glass wine weekly    Drug use: Never    Sexual activity: Not Currently     Partners: Female     Birth control/protection: Vasectomy, Surgical       Family History   Problem Relation Age of Onset    Breast cancer Mother 67         age 70    Cancer Mother     Heart attack Father     Other Father         Amyloidosis    Heart disease Father     Asthma Sister     Heart attack Maternal Grandmother     Cancer Maternal Grandmother     Kidney cancer Maternal Grandfather     Cancer Paternal Grandmother     Diabetes Paternal Grandfather     Cancer Paternal Grandfather     Cancer Other         Breast Cancer    Diabetes Other     Heart disease Other     Hypertension Other     Diverticulosis Other     Other Other         Amyloidosis       The following portions of the patient's history were reviewed and updated as appropriate: allergies, current medications, past family history, past medical history, past social history, past surgical history and problem list.    Review of Systems   All other systems reviewed and are negative.      Pertinent items are noted in HPI, all other systems reviewed and negative    /73 (BP Location: Right arm, Patient Position: Sitting, Cuff Size: Adult)   Pulse 97   Resp 18   Ht 180.3 cm (71\")   Wt 89.9 kg (198 lb 3.2 oz)   SpO2 96%   BMI 27.64 kg/m² .  Objective     Vitals reviewed.   Constitutional:       General: Not in acute distress.     Appearance: Normal appearance. Well-developed.   Eyes:      Pupils: Pupils are equal, round, and reactive to light.   HENT:      Head: Normocephalic and atraumatic.   Neck:      Vascular: No JVD.   Pulmonary:      Effort: Pulmonary effort is normal. "      Breath sounds: Normal breath sounds.   Cardiovascular:      Normal rate. Regular rhythm.   Pulses:     Intact distal pulses.   Edema:     Peripheral edema absent.   Abdominal:      General: There is no distension.      Palpations: Abdomen is soft.      Tenderness: There is no abdominal tenderness.   Musculoskeletal: Normal range of motion.      Cervical back: Normal range of motion and neck supple. Skin:     General: Skin is warm and dry.   Neurological:      Mental Status: Alert and oriented to person, place, and time.             ECG 12 Lead    Date/Time: 7/2/2024 1:02 PM  Performed by: Lily Wallace APRN    Authorized by: Lily Wallace APRN  Comparison: compared with previous ECG   Comparison to previous ECG:   Atrial Fibrillation patient  Rhythm: atrial fibrillation  Rate: normal  BPM: 97    Clinical impression: abnormal EKG          EKG ordered by and reviewed by me in office

## 2024-07-05 ENCOUNTER — TELEPHONE (OUTPATIENT)
Dept: CARDIOLOGY | Facility: CLINIC | Age: 74
End: 2024-07-05
Payer: MEDICARE

## 2024-07-05 NOTE — TELEPHONE ENCOUNTER
Caller: Kingston Mancini    Relationship: Self    Best call back number [phone]    What is the best time to reach you: ANYTIME    Who are you requesting to speak with (clinical staff, provider,  specific staff member): CLINICAL        What was the call regarding: PT DROPPED OFF HOLTER MONITOR 04/03/204.   HE IS STILL IN AFIB.  IS THERE ANYTHING HE SHOULD OR SHOULD NOT BE DOING?  PLEASE CALL TO ADVISE

## 2024-07-08 DIAGNOSIS — D68.9 COAGULATION DEFECT, UNSPECIFIED: Primary | ICD-10-CM

## 2024-07-08 NOTE — PROGRESS NOTES
Date of Office Visit: 07/10/2024  Encounter Provider: Dr. Cleve Bertrand  Place of Service: [facility] CARDIOLOGY [place]  Patient Name: Kingston Mancini  :1950  Steff Singletary MD    Chief Complaint   Patient presents with    Atrial Fibrillation    Hypertension    Hyperlipidemia    Follow-up     History of Present Illness:    I am pleased to see Mr. Mancini in my office today as a follow-up.    As you know, patient is 73 years old white gentleman whose past medical history is significant for hyperlipidemia, CAD, paroxysmal atrial fibrillation, who came today for follow-up.    In May 2024, patient developed symptom of angina pectoris and underwent cardiac catheterization which showed left main was normal, LAD had 25% stenosis, diagonal branch of LAD had medial and lateral branches and medial branch had 80 to 90% stenosis.  Patient underwent an attempted PCI.  Unfortunately balloon was unable to cross.  Wire was able to cross.  After multiple attempts of multiple sizes of balloon PCI was aborted.  RCA was free of significant disease.  Medical treatment was recommended.    In 2024, patient was seen by Dominique Martinez and was noted to have atrial fibrillation.  Patient was recommended to have Eliquis and Holter monitor was placed.  Patient came today for further discussion.    Holter monitor has not been processed yet.  Patient has returned the Holter monitor.  Patient denies any symptom.  He is short of breath.  He denies any palpitation.  Patient denies any syncope or presyncope.  No leg edema noted.    At this stage, I would recommend to proceed with ELENITA guided cardioversion.  I would start amiodarone 200 mg daily.  Continue Eliquis.        Past Medical History:   Diagnosis Date    Abnormal ECG     Cataract Years ago    Cataracts removed from both eyes May, 2022    Erectile dysfunction     History of basal cell cancer     Abstraction from [facility] Past Medical Hx states Basal Cell on ear     History of cardiac cath 2006    Abstraction From [facility] Heart Cath    History of degenerative disc disease     DDD    History of foreign travel     [place] July/August; [place] Oct/Nov    HL (hearing loss) Jan 1996    Permanent tinnitus from whiplash injury    Hyperlipidemia     Hypertension     AURELIA on CPAP 12/2017    AURELIA npsg Dec 2017 ahi 29 auto cpap 7-14 nasal mask    Osteoarthritis     PAF (paroxysmal atrial fibrillation) 7/2/2024    Prostate cancer 2011    [place] score 6, followed by Dr. Infante, age 59         Past Surgical History:   Procedure Laterality Date    CARDIAC CATHETERIZATION N/A 05/21/2024    Procedure: Left Heart Cath;  Surgeon: Cleve Bertrand MD;  Location: [facility] CATH INVASIVE LOCATION;  Service: Cardiovascular;  Laterality: N/A;    CYST REMOVAL  01/09/2020    EYE SURGERY  May, 2022    Cataracts in both eyes    HAND SURGERY      4/93, 10/93, 9/2007, 4/2013, 7/2013    HERNIA REPAIR  2002    OTHER SURGICAL HISTORY      Abstraction from [facility] [employer]    PROSTATECTOMY  2011           Current Outpatient Medications:     amLODIPine (NORVASC) 2.5 MG tablet, Take 1 tablet by mouth Daily., Disp: 90 tablet, Rfl: 3    apixaban (ELIQUIS) 5 MG tablet tablet, Take 1 tablet by mouth 2 (Two) Times a Day., Disp: 60 tablet, Rfl: 2    aspirin 81 MG EC tablet, Take 1 tablet by mouth Daily., Disp: 100 tablet, Rfl: 1    Black Pepper-Turmeric (TURMERIC COMPLEX/BLACK PEPPER PO), Take 1 capsule by mouth Daily., Disp: , Rfl:     Cholecalciferol (VITAMIN D3) 2000 units tablet, VITAMIN D3 2000 UNIT TABS, Disp: , Rfl:     isosorbide mononitrate (IMDUR) 30 MG 24 hr tablet, Take 1 tablet by mouth Daily., Disp: 90 tablet, Rfl: 3    loratadine (CLARITIN) 10 MG tablet, Take 1 tablet by mouth Daily., Disp: , Rfl:     Multiple Vitamins-Minerals (ICAPS AREDS 2 PO), , Disp: , Rfl:     pantoprazole (Protonix) 20 MG EC tablet, Take 1 tablet by mouth Daily., Disp: 90 tablet, Rfl: 0    Probiotic Product  (PROBIOTIC-10 PO), Take 1 tablet by mouth Daily., Disp: , Rfl:     rosuvastatin (Crestor) 10 MG tablet, Take 1 tablet by mouth Daily., Disp: 90 tablet, Rfl: 1    amiodarone (PACERONE) 200 MG tablet, Take 1 tablet by mouth Daily., Disp: 90 tablet, Rfl: 1      Social History     Socioeconomic History    Marital status:      Spouse name: Melodie    Number of children: 3    Years of education: College   Tobacco Use    Smoking status: Never     Passive exposure: Never    Smokeless tobacco: Never   Vaping Use    Vaping status: Never Used   Substance and Sexual Activity    Alcohol use: Yes     Alcohol/week: 6.0 standard drinks of alcohol     Types: 4 Glasses of wine, 2 Cans of beer per week     Comment: 4-5 beer or glass wine weekly    Drug use: Never    Sexual activity: Not Currently     Partners: Female     Birth control/protection: Vasectomy, Surgical         Review of Systems   Constitutional: Negative for chills and fever.   HENT:  Negative for ear discharge and nosebleeds.    Eyes:  Negative for discharge and redness.   Cardiovascular:  Positive for palpitations. Negative for chest pain, orthopnea, paroxysmal nocturnal dyspnea and syncope.   Respiratory:  Positive for shortness of breath. Negative for cough and wheezing.    Endocrine: Negative for heat intolerance.   Skin:  Negative for rash.   Musculoskeletal:  Negative for arthritis and myalgias.   Gastrointestinal:  Negative for abdominal pain, melena, nausea and vomiting.   Genitourinary:  Negative for dysuria and hematuria.   Neurological:  Negative for dizziness, light-headedness, numbness and tremors.   Psychiatric/Behavioral:  Negative for depression. The patient is not nervous/anxious.        Procedures      ECG 12 Lead    Date/Time: 7/10/2024 4:13 PM  Performed by: Cleve Bertrand MD    Authorized by: Cleve Bertrand MD  Comparison: compared with previous ECG   Similar to previous ECG  Rhythm: atrial fibrillation    Clinical impression: abnormal  "EKG          ECG 12 Lead    (Results Pending)           Objective:    /76 (BP Location: Right arm, Patient Position: Sitting, Cuff Size: Large Adult)   Pulse 81   Resp 16   Ht 180.3 cm (70.98\")   Wt 88.9 kg (196 lb)   SpO2 95%   BMI 27.35 kg/m²         Constitutional:       Appearance: Well-developed.   Eyes:      General: No scleral icterus.        Right eye: No discharge.   HENT:      Head: Normocephalic and atraumatic.   Neck:      Thyroid: No thyromegaly.      Lymphadenopathy: No cervical adenopathy.   Pulmonary:      Effort: Pulmonary effort is normal. No respiratory distress.      Breath sounds: Normal breath sounds. No wheezing. No rales.   Cardiovascular:      Normal rate. Irregularly irregular rhythm.      No gallop.    Edema:     Peripheral edema absent.   Abdominal:      Tenderness: There is no abdominal tenderness.   Skin:     Findings: No erythema or rash.   Neurological:      Mental Status: Alert and oriented to person, place, and time.             Assessment:       Diagnosis Plan   1. Mixed hyperlipidemia  ECG 12 Lead    Cardioversion External in Cardiology Department    CBC (No Diff)    Basic Metabolic Panel    Protime-INR    aPTT    Adult Transesophageal Echo (ELENITA) W/ Cont if Necessary Per Protocol    amiodarone (PACERONE) 200 MG tablet      2. Primary hypertension  ECG 12 Lead    Cardioversion External in Cardiology Department    CBC (No Diff)    Basic Metabolic Panel    Protime-INR    aPTT    Adult Transesophageal Echo (ELENITA) W/ Cont if Necessary Per Protocol    amiodarone (PACERONE) 200 MG tablet      3. PAF (paroxysmal atrial fibrillation)  ECG 12 Lead    Cardioversion External in Cardiology Department    CBC (No Diff)    Basic Metabolic Panel    Protime-INR    aPTT    Adult Transesophageal Echo (ELENITA) W/ Cont if Necessary Per Protocol    amiodarone (PACERONE) 200 MG tablet      4. Coronary artery disease involving native coronary artery of native heart without angina pectoris  " Cardioversion External in Cardiology Department    CBC (No Diff)    Basic Metabolic Panel    Protime-INR    aPTT    Adult Transesophageal Echo (ELENITA) W/ Cont if Necessary Per Protocol    amiodarone (PACERONE) 200 MG tablet               Plan:       MDM:    1.  CAD:    Patient has diagnosed branch of LAD disease which was not amenable to percutaneous intervention and failed PCI.  Would recommend medical treatment patient is free of chest pain at present    2.  Paroxysmal atrial fibrillation:    Patient is noted to have persistent atrial fibrillation continue Eliquis.  I would proceed with ELENITA guided cardioversion start amiodarone    3.  Hypertension:    Blood pressure is controlled    4.  Mixed hyperlipidemia:    Patient is on Crestor repeat lipid panel testing

## 2024-07-08 NOTE — TELEPHONE ENCOUNTER
Spoke with patient will send in eliquis to [facility] and routed to  to make an appointment . PT is concerned and would like a sooner appointment.

## 2024-07-10 ENCOUNTER — OFFICE VISIT (OUTPATIENT)
Dept: CARDIOLOGY | Facility: CLINIC | Age: 74
End: 2024-07-10
Payer: MEDICARE

## 2024-07-10 VITALS
SYSTOLIC BLOOD PRESSURE: 116 MMHG | BODY MASS INDEX: 27.44 KG/M2 | WEIGHT: 196 LBS | HEART RATE: 81 BPM | DIASTOLIC BLOOD PRESSURE: 76 MMHG | RESPIRATION RATE: 16 BRPM | OXYGEN SATURATION: 95 % | HEIGHT: 71 IN

## 2024-07-10 DIAGNOSIS — I10 PRIMARY HYPERTENSION: ICD-10-CM

## 2024-07-10 DIAGNOSIS — E78.2 MIXED HYPERLIPIDEMIA: Primary | ICD-10-CM

## 2024-07-10 DIAGNOSIS — I25.10 CORONARY ARTERY DISEASE INVOLVING NATIVE CORONARY ARTERY OF NATIVE HEART WITHOUT ANGINA PECTORIS: ICD-10-CM

## 2024-07-10 DIAGNOSIS — I48.0 PAF (PAROXYSMAL ATRIAL FIBRILLATION): ICD-10-CM

## 2024-07-10 PROCEDURE — 1160F RVW MEDS BY RX/DR IN RCRD: CPT | Performed by: INTERNAL MEDICINE

## 2024-07-10 PROCEDURE — 3078F DIAST BP <80 MM HG: CPT | Performed by: INTERNAL MEDICINE

## 2024-07-10 PROCEDURE — 3074F SYST BP LT 130 MM HG: CPT | Performed by: INTERNAL MEDICINE

## 2024-07-10 PROCEDURE — 1159F MED LIST DOCD IN RCRD: CPT | Performed by: INTERNAL MEDICINE

## 2024-07-10 PROCEDURE — 93000 ELECTROCARDIOGRAM COMPLETE: CPT | Performed by: INTERNAL MEDICINE

## 2024-07-10 PROCEDURE — 99214 OFFICE O/P EST MOD 30 MIN: CPT | Performed by: INTERNAL MEDICINE

## 2024-07-10 RX ORDER — AMIODARONE HYDROCHLORIDE 200 MG/1
200 TABLET ORAL DAILY
Qty: 90 TABLET | Refills: 1 | Status: SHIPPED | OUTPATIENT
Start: 2024-07-10

## 2024-07-11 ENCOUNTER — TELEPHONE (OUTPATIENT)
Dept: CARDIOLOGY | Facility: CLINIC | Age: 74
End: 2024-07-11
Payer: MEDICARE

## 2024-07-11 NOTE — TELEPHONE ENCOUNTER
Spoke with patient about data error for 24 hour MCOT . Will return Monday to redo and requested a [employer] message be sent as a reminder.

## 2024-07-15 ENCOUNTER — PATIENT MESSAGE (OUTPATIENT)
Dept: NEUROLOGY | Facility: CLINIC | Age: 74
End: 2024-07-15
Payer: MEDICARE

## 2024-07-15 DIAGNOSIS — G47.33 OSA (OBSTRUCTIVE SLEEP APNEA): Primary | ICD-10-CM

## 2024-07-16 NOTE — TELEPHONE ENCOUNTER
From: Kingston Mancini  To: Joseph Seipel  Sent: 7/15/2024 11:16 AM EDT  Subject: CPAP supplies    Hello!  Would you please fax my new CPAP prescription to [facility]?   Daniel garibay is simply not working out.  Customer Service was much better at ordering re-supplies.    Thank You!

## 2024-07-20 ENCOUNTER — LAB (OUTPATIENT)
Dept: LAB | Facility: HOSPITAL | Age: 74
End: 2024-07-20
Payer: MEDICARE

## 2024-07-20 DIAGNOSIS — I48.0 PAF (PAROXYSMAL ATRIAL FIBRILLATION): ICD-10-CM

## 2024-07-20 DIAGNOSIS — E78.2 MIXED HYPERLIPIDEMIA: ICD-10-CM

## 2024-07-20 DIAGNOSIS — I25.10 CORONARY ARTERY DISEASE INVOLVING NATIVE CORONARY ARTERY OF NATIVE HEART WITHOUT ANGINA PECTORIS: ICD-10-CM

## 2024-07-20 DIAGNOSIS — I10 PRIMARY HYPERTENSION: ICD-10-CM

## 2024-07-20 LAB
ANION GAP SERPL CALCULATED.3IONS-SCNC: 10.3 MMOL/L (ref 5–15)
APTT PPP: 28.9 SECONDS (ref 24–31)
BUN SERPL-MCNC: 19 MG/DL (ref 8–23)
BUN/CREAT SERPL: 15.3 (ref 7–25)
CALCIUM SPEC-SCNC: 10.1 MG/DL (ref 8.6–10.5)
CHLORIDE SERPL-SCNC: 105 MMOL/L (ref 98–107)
CO2 SERPL-SCNC: 26.7 MMOL/L (ref 22–29)
CREAT SERPL-MCNC: 1.24 MG/DL (ref 0.76–1.27)
DEPRECATED RDW RBC AUTO: 41.1 FL (ref 37–54)
EGFRCR SERPLBLD CKD-EPI 2021: 61.8 ML/MIN/1.73
ERYTHROCYTE [DISTWIDTH] IN BLOOD BY AUTOMATED COUNT: 12.5 % (ref 12.3–15.4)
GLUCOSE SERPL-MCNC: 93 MG/DL (ref 65–99)
HCT VFR BLD AUTO: 48.8 % (ref 37.5–51)
HGB BLD-MCNC: 16.1 G/DL (ref 13–17.7)
INR PPP: 1.04 (ref 0.93–1.1)
MCH RBC QN AUTO: 30 PG (ref 26.6–33)
MCHC RBC AUTO-ENTMCNC: 33 G/DL (ref 31.5–35.7)
MCV RBC AUTO: 90.9 FL (ref 79–97)
PLATELET # BLD AUTO: 251 10*3/MM3 (ref 140–450)
PMV BLD AUTO: 9.6 FL (ref 6–12)
POTASSIUM SERPL-SCNC: 4.3 MMOL/L (ref 3.5–5.2)
PROTHROMBIN TIME: 11.3 SECONDS (ref 9.6–11.7)
RBC # BLD AUTO: 5.37 10*6/MM3 (ref 4.14–5.8)
SODIUM SERPL-SCNC: 142 MMOL/L (ref 136–145)
WBC NRBC COR # BLD AUTO: 6.31 10*3/MM3 (ref 3.4–10.8)

## 2024-07-20 PROCEDURE — 80048 BASIC METABOLIC PNL TOTAL CA: CPT

## 2024-07-20 PROCEDURE — 85027 COMPLETE CBC AUTOMATED: CPT

## 2024-07-20 PROCEDURE — 36415 COLL VENOUS BLD VENIPUNCTURE: CPT

## 2024-07-20 PROCEDURE — 85610 PROTHROMBIN TIME: CPT

## 2024-07-20 PROCEDURE — 85730 THROMBOPLASTIN TIME PARTIAL: CPT

## 2024-07-22 ENCOUNTER — TELEPHONE (OUTPATIENT)
Dept: NEUROLOGY | Facility: CLINIC | Age: 74
End: 2024-07-22
Payer: MEDICARE

## 2024-07-22 RX ORDER — PHENOL 1.4 %
600 AEROSOL, SPRAY (ML) MUCOUS MEMBRANE DAILY
COMMUNITY

## 2024-07-22 NOTE — TELEPHONE ENCOUNTER
Caller: NETO    Bony call back number: [phone]       What was the call regarding: PT NEEDS RX SENT TO [facility] ASAP HE IS LEAVING [facility]  FRIDAY AND WOULD LIKE TO GET SUPPLIES     Is it okay if the provider responds through [employer]: CALL TO LET HIM    PHONE [phone]     [phone]    PLEASE ADVISE.

## 2024-07-23 ENCOUNTER — ANESTHESIA EVENT (OUTPATIENT)
Dept: CARDIOLOGY | Facility: HOSPITAL | Age: 74
End: 2024-07-23
Payer: MEDICARE

## 2024-07-23 ENCOUNTER — HOSPITAL ENCOUNTER (OUTPATIENT)
Dept: CARDIOLOGY | Facility: HOSPITAL | Age: 74
Discharge: HOME | End: 2024-07-23
Payer: MEDICARE

## 2024-07-23 ENCOUNTER — TELEPHONE (OUTPATIENT)
Dept: CARDIOLOGY | Facility: CLINIC | Age: 74
End: 2024-07-23
Payer: MEDICARE

## 2024-07-23 ENCOUNTER — ANESTHESIA (OUTPATIENT)
Dept: CARDIOLOGY | Facility: HOSPITAL | Age: 74
End: 2024-07-23
Payer: MEDICARE

## 2024-07-23 VITALS
BODY MASS INDEX: 28.06 KG/M2 | OXYGEN SATURATION: 96 % | HEART RATE: 63 BPM | TEMPERATURE: 97.6 F | SYSTOLIC BLOOD PRESSURE: 115 MMHG | HEIGHT: 70 IN | DIASTOLIC BLOOD PRESSURE: 79 MMHG | WEIGHT: 195.99 LBS | RESPIRATION RATE: 12 BRPM

## 2024-07-23 DIAGNOSIS — I25.10 CORONARY ARTERY DISEASE INVOLVING NATIVE CORONARY ARTERY OF NATIVE HEART WITHOUT ANGINA PECTORIS: ICD-10-CM

## 2024-07-23 DIAGNOSIS — I48.0 PAF (PAROXYSMAL ATRIAL FIBRILLATION): ICD-10-CM

## 2024-07-23 DIAGNOSIS — I10 PRIMARY HYPERTENSION: ICD-10-CM

## 2024-07-23 DIAGNOSIS — E78.2 MIXED HYPERLIPIDEMIA: ICD-10-CM

## 2024-07-23 LAB
BH CV ECHO SHUNT ASSESSMENT PERFORMED (HIDDEN SCRIPTING): 1
QT INTERVAL: 432 MS
QTC INTERVAL: 441 MS

## 2024-07-23 PROCEDURE — 92960 CARDIOVERSION ELECTRIC EXT: CPT | Performed by: INTERNAL MEDICINE

## 2024-07-23 PROCEDURE — 93320 DOPPLER ECHO COMPLETE: CPT

## 2024-07-23 PROCEDURE — 93005 ELECTROCARDIOGRAM TRACING: CPT | Performed by: INTERNAL MEDICINE

## 2024-07-23 PROCEDURE — 93312 ECHO TRANSESOPHAGEAL: CPT

## 2024-07-23 PROCEDURE — 25810000003 SODIUM CHLORIDE 0.9 % SOLUTION: Performed by: NURSE ANESTHETIST, CERTIFIED REGISTERED

## 2024-07-23 PROCEDURE — 25010000002 PROPOFOL 200 MG/20ML EMULSION: Performed by: NURSE ANESTHETIST, CERTIFIED REGISTERED

## 2024-07-23 PROCEDURE — 93325 DOPPLER ECHO COLOR FLOW MAPG: CPT

## 2024-07-23 PROCEDURE — 92960 CARDIOVERSION ELECTRIC EXT: CPT

## 2024-07-23 RX ORDER — LIDOCAINE HYDROCHLORIDE 20 MG/ML
INJECTION, SOLUTION EPIDURAL; INFILTRATION; INTRACAUDAL; PERINEURAL AS NEEDED
Status: DISCONTINUED | OUTPATIENT
Start: 2024-07-23 | End: 2024-07-23 | Stop reason: SURG

## 2024-07-23 RX ORDER — PROPOFOL 10 MG/ML
INJECTION, EMULSION INTRAVENOUS AS NEEDED
Status: DISCONTINUED | OUTPATIENT
Start: 2024-07-23 | End: 2024-07-23 | Stop reason: SURG

## 2024-07-23 RX ORDER — SODIUM CHLORIDE 9 MG/ML
INJECTION, SOLUTION INTRAVENOUS CONTINUOUS PRN
Status: DISCONTINUED | OUTPATIENT
Start: 2024-07-23 | End: 2024-07-23 | Stop reason: SURG

## 2024-07-23 RX ADMIN — SODIUM CHLORIDE: 9 INJECTION, SOLUTION INTRAVENOUS at 11:03

## 2024-07-23 RX ADMIN — LIDOCAINE HYDROCHLORIDE 100 MG: 20 INJECTION, SOLUTION EPIDURAL; INFILTRATION; INTRACAUDAL; PERINEURAL at 11:09

## 2024-07-23 RX ADMIN — PROPOFOL 300 MG: 10 INJECTION, EMULSION INTRAVENOUS at 11:09

## 2024-07-23 NOTE — DISCHARGE INSTRUCTIONS
ELENITA DC Instructions    The medication, which was used to put the patient to sleep, will be acting in your body for the next twenty-four (24) hours, so you might feel a little sleepy; this feeling will slowly wear off.  Because the medicine is still in your system for the next twenty-four (24) hours, the adult patient SHOULD NOT:    Drive a car, operate machinery, or power tools  Drink any alcoholic drinks (not even beer)  Make any important decisions such as to sign important papers      We strongly suggest that a responsible adult be with the patient the rest of the day.    Any problems with:    EXCESSIVE MUCOUS  SPITTING UP BLOOD  SORE THROAT AT MORE THAN 72 HOURS    NOTIFY DR. Bertrand [phone] OR CALL THE [facility] EMERGENCY CENTER [phone].

## 2024-07-23 NOTE — ANESTHESIA POSTPROCEDURE EVALUATION
Patient: Kingston Mancini    Procedure Summary       Date: 07/23/24 Room / Location: [facility]    Anesthesia Start: 1103 Anesthesia Stop: 1129    Procedures:       CARDIOVERSION EXTERNAL IN CARDIOLOGY DEPARTMENT      ADULT TRANSESOPHAGEAL ECHO (ELENITA) W/ CONT IF NECESSARY PER PROTOCOL Diagnosis:       Mixed hyperlipidemia      Primary hypertension      PAF (paroxysmal atrial fibrillation)      Coronary artery disease involving native coronary artery of native heart without angina pectoris      (Atrial fibrillation)      (Arrhythmia)      (AFib)    Scheduled Providers: Cleve Bertrand MD Provider: Esau Juarez MD    Anesthesia Type: general ASA Status: 3            Anesthesia Type: general    Vitals  Vitals Value Taken Time   /79 07/23/24 1211   Temp     Pulse 64 07/23/24 1216   Resp     SpO2 61 % 07/23/24 1217   Vitals shown include unfiled device data.        Post Anesthesia Care and Evaluation    Patient location during evaluation: PACU  Patient participation: complete - patient participated  Level of consciousness: awake  Pain score: 0  Pain management: adequate  Anesthetic complications: No anesthetic complications  PONV Status: none  Cardiovascular status: acceptable  Respiratory status: acceptable  Hydration status: acceptable

## 2024-07-23 NOTE — ANESTHESIA PREPROCEDURE EVALUATION
Anesthesia Evaluation     Patient summary reviewed and Nursing notes reviewed   NPO Solid Status: > 8 hours             Airway   Mallampati: II  TM distance: >3 FB  Neck ROM: full  No difficulty expected  Dental - normal exam     Pulmonary - negative pulmonary ROS and normal exam   (-) not a smoker  Cardiovascular - normal exam    ECG reviewed    (+) hypertension, CAD, dysrhythmias Atrial Fib, hyperlipidemia      Neuro/Psych- negative ROS  GI/Hepatic/Renal/Endo    (+) GERD    Musculoskeletal (-) negative ROS    Abdominal  - normal exam    Bowel sounds: normal.   Substance History - negative use     OB/GYN negative ob/gyn ROS         Other                    Anesthesia Plan    ASA 3     general       Anesthetic plan, risks, benefits, and alternatives have been provided, discussed and informed consent has been obtained with: patient.    CODE STATUS:

## 2024-07-25 ENCOUNTER — OFFICE VISIT (OUTPATIENT)
Dept: FAMILY MEDICINE CLINIC | Facility: CLINIC | Age: 74
End: 2024-07-25
Payer: MEDICARE

## 2024-07-25 VITALS
OXYGEN SATURATION: 95 % | TEMPERATURE: 98.2 F | HEART RATE: 78 BPM | SYSTOLIC BLOOD PRESSURE: 106 MMHG | DIASTOLIC BLOOD PRESSURE: 58 MMHG | BODY MASS INDEX: 28.92 KG/M2 | HEIGHT: 70 IN | WEIGHT: 202 LBS | RESPIRATION RATE: 16 BRPM

## 2024-07-25 DIAGNOSIS — I48.0 PAF (PAROXYSMAL ATRIAL FIBRILLATION): Primary | ICD-10-CM

## 2024-07-25 PROBLEM — S02.2XXA CLOSED FRACTURE OF NASAL BONES: Status: RESOLVED | Noted: 2024-06-11 | Resolved: 2024-07-25

## 2024-07-25 PROBLEM — R06.02 SHORTNESS OF BREATH: Status: RESOLVED | Noted: 2024-05-08 | Resolved: 2024-07-25

## 2024-07-25 PROCEDURE — 3074F SYST BP LT 130 MM HG: CPT | Performed by: FAMILY MEDICINE

## 2024-07-25 PROCEDURE — 1126F AMNT PAIN NOTED NONE PRSNT: CPT | Performed by: FAMILY MEDICINE

## 2024-07-25 PROCEDURE — 99213 OFFICE O/P EST LOW 20 MIN: CPT | Performed by: FAMILY MEDICINE

## 2024-07-25 PROCEDURE — 3078F DIAST BP <80 MM HG: CPT | Performed by: FAMILY MEDICINE

## 2024-07-25 PROCEDURE — G2211 COMPLEX E/M VISIT ADD ON: HCPCS | Performed by: FAMILY MEDICINE

## 2024-07-25 RX ORDER — OMEPRAZOLE 20 MG/1
20 CAPSULE, DELAYED RELEASE ORAL DAILY
COMMUNITY

## 2024-07-25 NOTE — PROGRESS NOTES
Subjective   Chief Complaint   Patient presents with    Follow-up    Hypertension    Atrial Fibrillation     Kingston Mancini is a 73 y.o. male.     Patient Care Team:  Steff Singletary MD as PCP - General (Family Medicine)  Seipel, Joseph F, MD as Consulting Physician (Sleep Medicine)  Mono Moreno MD as Consulting Physician (Otolaryngology)  Akira Reyes MD as Consulting Physician (Gastroenterology)  Jeancarlos Ortega Jr., MD as Consulting Physician (Dermatology)  David Fitzpatrick MD as Consulting Physician (Ophthalmology)    History of Present Illness  He is coming in today with his wife to follow-up on his recent medical development.  Patient reports that in the recent past he was diagnosed with atrial fibrillation through his cardiology office.  He since then had cardioversion down few weeks ago and since then he has been staying in sinus rhythm.  He was started on amiodarone and Eliquis.  Patient would like to get a new referral to see electrophysiology specialist in [place] for the second opinion.  He is currently under the care of the ENT and the hand specialist as well as the ophthalmologist and has some upcoming surgeries down the road.       The following portions of the patient's history were reviewed and updated as appropriate: allergies, current medications, past family history, past medical history, past social history, past surgical history, and problem list.  Past Medical History:   Diagnosis Date    Abnormal ECG     Cataract Years ago    Cataracts removed from both eyes May, 2022    Erectile dysfunction     History of basal cell cancer 2002    [facility] Past Medical Hx states Basal Cell on ear    History of cardiac cath 2006    [facility]raction [facility] Heart Cath    History of degenerative disc disease     DDD    History of foreign travel     [place] July/August; [place] Oct/Nov    HL (hearing loss) Jan 1996    Permanent tinnitus from whiplash injury     Hyperlipidemia     Hypertension     AURELIA on CPAP 2017    AURELIA npsg Dec 2017 ahi 29 auto cpap 7-14 nasal mask    Osteoarthritis     PAF (paroxysmal atrial fibrillation) 2024    Prostate cancer     Hussein score 6, followed by Dr. Infante, age 59     Past Surgical History:   Procedure Laterality Date    CARDIAC CATHETERIZATION N/A 2024    Procedure: Left Heart Cath;  Surgeon: Cleve Bertrand MD;  Location: [facility] CATH INVASIVE LOCATION;  Service: Cardiovascular;  Laterality: N/A;    CYST REMOVAL  2020    EYE SURGERY  May, 2022    Cataracts in both eyes    HAND SURGERY      , 10/93, 2007, 2013, 2013    HERNIA REPAIR      OTHER SURGICAL HISTORY      [facility]    PROSTATECTOMY       The patient has a family history of  Family History   Problem Relation Age of Onset    Breast cancer Mother 67         age 70    Cancer Mother     Heart attack Father     Other Father         Amyloidosis    Heart disease Father     Asthma Sister     Heart attack Maternal Grandmother     Cancer Maternal Grandmother     Kidney cancer Maternal Grandfather     Cancer Paternal Grandmother     Diabetes Paternal Grandfather     Cancer Paternal Grandfather     Cancer Other         Breast Cancer    Diabetes Other     Heart disease Other     Hypertension Other     Diverticulosis Other     Other Other         Amyloidosis     Social History     Socioeconomic History    Marital status:      Spouse name: Melodie    Number of children: 3    Years of education: College   Tobacco Use    Smoking status: Never     Passive exposure: Never    Smokeless tobacco: Never   Vaping Use    Vaping status: Never Used   Substance and Sexual Activity    Alcohol use: Yes     Alcohol/week: 6.0 standard drinks of alcohol     Types: 4 Glasses of wine, 2 Cans of beer per week     Comment: 4-5 beer or glass wine weekly    Drug use: Never    Sexual activity: Not Currently     Partners: Female      "Birth control/protection: Vasectomy, Surgical       Review of Systems   Constitutional:  Negative for activity change, fatigue and fever.   Respiratory:  Negative for shortness of breath and wheezing.    Cardiovascular:  Negative for chest pain, palpitations and leg swelling.   Musculoskeletal:  Negative for arthralgias and back pain.   Skin:  Negative for rash.   Neurological:  Negative for tremors and headache.     Visit Vitals  /58 (BP Location: Left arm, Patient Position: Sitting, Cuff Size: Adult)   Pulse 78   Temp 98.2 °F (36.8 °C) (Infrared)   Resp 16   Ht 177.8 cm (70\")   Wt 91.6 kg (202 lb)   SpO2 95%   BMI 28.98 kg/m²          Current Outpatient Medications:     amiodarone (PACERONE) 200 MG tablet, Take 1 tablet by mouth Daily., Disp: 90 tablet, Rfl: 1    amLODIPine (NORVASC) 2.5 MG tablet, Take 1 tablet by mouth Daily., Disp: 90 tablet, Rfl: 3    apixaban (ELIQUIS) 5 MG tablet tablet, Take 1 tablet by mouth 2 (Two) Times a Day., Disp: 60 tablet, Rfl: 2    aspirin 81 MG EC tablet, Take 1 tablet by mouth Daily., Disp: 100 tablet, Rfl: 1    Black Pepper-Turmeric (TURMERIC COMPLEX/BLACK PEPPER PO), Take 1 capsule by mouth Daily., Disp: , Rfl:     calcium carbonate (OS-LAURYN) 600 MG tablet, Take 1 tablet by mouth Daily., Disp: , Rfl:     Cholecalciferol (VITAMIN D3) 2000 units tablet, VITAMIN D3 2000 UNIT TABS, Disp: , Rfl:     isosorbide mononitrate (IMDUR) 30 MG 24 hr tablet, Take 1 tablet by mouth Daily., Disp: 90 tablet, Rfl: 3    loratadine (CLARITIN) 10 MG tablet, Take 1 tablet by mouth Daily., Disp: , Rfl:     Multiple Vitamins-Minerals (ICAPS AREDS 2 PO), , Disp: , Rfl:     omeprazole (priLOSEC) 20 MG capsule, Take 1 capsule by mouth Daily., Disp: , Rfl:     Probiotic Product (PROBIOTIC-10 PO), Take 1 tablet by mouth Daily., Disp: , Rfl:     rosuvastatin (Crestor) 10 MG tablet, Take 1 tablet by mouth Daily., Disp: 90 tablet, Rfl: 1    Objective   Physical Exam  Constitutional:       General: He is " not in acute distress.     Appearance: Normal appearance. He is well-developed. He is not ill-appearing or diaphoretic.      Comments: Patient is in no distress, patient has normal voice and speech.  Normal respiratory effort.   HENT:      Head: Normocephalic and atraumatic.   Pulmonary:      Effort: Pulmonary effort is normal.   Musculoskeletal:      Cervical back: Normal range of motion and neck supple.   Neurological:      General: No focal deficit present.      Mental Status: He is alert and oriented to person, place, and time. Mental status is at baseline.   Psychiatric:         Mood and Affect: Mood normal.       TSH          12/11/2023    08:49 5/1/2024    14:29   TSH   TSH 1.230  0.194          Assessment & Plan   Diagnoses and all orders for this visit:    1. PAF (paroxysmal atrial fibrillation) (Primary)  -     T4, Free  -     TSH  -     Ambulatory Referral to Cardiology  -     Basic Metabolic Panel      I reviewed his recent hospital records, I reviewed his medications and his recent labs.  Patient was started on amiodarone and Eliquis recently after atrial fibrillation diagnosis.  Since then patient had ablation done, patient would like to get a second opinion and the new referral was given to the patient.  All questions were answered today to my best knowledge and patient satisfaction and understanding.  He will follow-up with us down the road for further management of his chronic medical issues.      Return in about 5 months (around 12/25/2024) for Medicare Wellness.    Requested Prescriptions      No prescriptions requested or ordered in this encounter       Steff Singletary MD  07/25/2024

## 2024-08-01 LAB
QT INTERVAL: 432 MS
QTC INTERVAL: 441 MS

## 2024-08-04 LAB — BH CV ECHO SHUNT ASSESSMENT PERFORMED (HIDDEN SCRIPTING): 1

## 2024-08-08 DIAGNOSIS — D68.9 COAGULATION DEFECT, UNSPECIFIED: ICD-10-CM

## 2024-08-19 ENCOUNTER — TELEPHONE (OUTPATIENT)
Dept: CARDIOLOGY | Facility: CLINIC | Age: 74
End: 2024-08-19
Payer: MEDICARE

## 2024-08-19 NOTE — TELEPHONE ENCOUNTER
Reviewed recommendations with patient, verbalized understanding, will call with any further questions or complaints.  Pt knows to expect call from  for appointment.    Emily- per Dr. Maciel's request, can pt get worked in to see EP soon?  Please, and thanks so much!    Emily Grubbs RN  Triage Nurse  08/19/24 14:11 EDT

## 2024-08-19 NOTE — TELEPHONE ENCOUNTER
Caller: Kingston Mancini    Relationship to patient: Self    Best call back number: [phone]    Patient is needing: PATIENT HAS BEEN HAVING AFIB AND BEEN REQUESTING AN EARLIER APPT THAN HE HAS SCHEDULED. PLEASE CALL IF AN APPOINTMENT COMES AVAILABLE.

## 2024-08-19 NOTE — TELEPHONE ENCOUNTER
"I spoke with pt.  He states that he was cardioverted on 7/23/24, and went back into A Fib (per [employer]) the following Monday.  Pt states that he is very fatigued, every now and then has a \"twinge\" of CP, he gets very tired with any exertion and feels his heart racing now and then.  Pt states HR runs from 's with -140's/60-90.  Pt states that he missed 1 dose of eliquis about 1-2 weeks ago, but otherwise continues all meds as listed in epic including amiodarone 200mg daily.  Pt also states that he has some surgeries that he needs to have coming up, and would like to discuss with you as well.  He is requesting an appointment.    Recommendations?    Thanks so much,  Emily Grubbs, RN  Triage RN  08/19/24 13:10 EDT    "

## 2024-09-06 ENCOUNTER — LAB (OUTPATIENT)
Dept: FAMILY MEDICINE CLINIC | Facility: CLINIC | Age: 74
End: 2024-09-06
Payer: MEDICARE

## 2024-09-06 PROCEDURE — 80048 BASIC METABOLIC PNL TOTAL CA: CPT | Performed by: FAMILY MEDICINE

## 2024-09-06 PROCEDURE — 84443 ASSAY THYROID STIM HORMONE: CPT | Performed by: FAMILY MEDICINE

## 2024-09-06 PROCEDURE — 84439 ASSAY OF FREE THYROXINE: CPT | Performed by: FAMILY MEDICINE

## 2024-09-07 LAB
ANION GAP SERPL CALCULATED.3IONS-SCNC: 13 MMOL/L (ref 5–15)
BUN SERPL-MCNC: 30 MG/DL (ref 8–23)
BUN/CREAT SERPL: 25.2 (ref 7–25)
CALCIUM SPEC-SCNC: 9.7 MG/DL (ref 8.6–10.5)
CHLORIDE SERPL-SCNC: 104 MMOL/L (ref 98–107)
CO2 SERPL-SCNC: 23 MMOL/L (ref 22–29)
CREAT SERPL-MCNC: 1.19 MG/DL (ref 0.76–1.27)
EGFRCR SERPLBLD CKD-EPI 2021: 64.9 ML/MIN/1.73
GLUCOSE SERPL-MCNC: 147 MG/DL (ref 65–99)
POTASSIUM SERPL-SCNC: 4 MMOL/L (ref 3.5–5.2)
SODIUM SERPL-SCNC: 140 MMOL/L (ref 136–145)
T4 FREE SERPL-MCNC: 1.3 NG/DL (ref 0.92–1.68)
TSH SERPL DL<=0.05 MIU/L-ACNC: 1.55 UIU/ML (ref 0.27–4.2)

## 2024-09-09 ENCOUNTER — TRANSCRIBE ORDERS (OUTPATIENT)
Dept: CARDIOLOGY | Facility: CLINIC | Age: 74
End: 2024-09-09
Payer: MEDICARE

## 2024-09-09 ENCOUNTER — OFFICE VISIT (OUTPATIENT)
Age: 74
End: 2024-09-09
Payer: MEDICARE

## 2024-09-09 VITALS
SYSTOLIC BLOOD PRESSURE: 118 MMHG | DIASTOLIC BLOOD PRESSURE: 82 MMHG | BODY MASS INDEX: 29.2 KG/M2 | WEIGHT: 204 LBS | HEART RATE: 73 BPM | HEIGHT: 70 IN

## 2024-09-09 DIAGNOSIS — I48.0 PAF (PAROXYSMAL ATRIAL FIBRILLATION): Primary | ICD-10-CM

## 2024-09-09 DIAGNOSIS — I25.10 CORONARY ARTERY DISEASE INVOLVING NATIVE CORONARY ARTERY OF NATIVE HEART WITHOUT ANGINA PECTORIS: ICD-10-CM

## 2024-09-09 DIAGNOSIS — Z13.6 SCREENING FOR ISCHEMIC HEART DISEASE: ICD-10-CM

## 2024-09-09 DIAGNOSIS — Z01.810 PRE-OPERATIVE CARDIOVASCULAR EXAMINATION: Primary | ICD-10-CM

## 2024-09-09 PROCEDURE — 3079F DIAST BP 80-89 MM HG: CPT | Performed by: INTERNAL MEDICINE

## 2024-09-09 PROCEDURE — 93000 ELECTROCARDIOGRAM COMPLETE: CPT | Performed by: INTERNAL MEDICINE

## 2024-09-09 PROCEDURE — 3074F SYST BP LT 130 MM HG: CPT | Performed by: INTERNAL MEDICINE

## 2024-09-09 PROCEDURE — 99204 OFFICE O/P NEW MOD 45 MIN: CPT | Performed by: INTERNAL MEDICINE

## 2024-09-09 NOTE — PROGRESS NOTES
Date of Office Visit: 2024  Encounter Provider: Kingston Maciel MD  Place of Service: [facility] CARDIOLOGY  Patient Name: Kingston Mancini  : 1950    Subjective:     Encounter Date:2024      Patient ID: Kingston Mancini is a 73 y.o. male who has a cc of self referred for pers AF. He noted oquendo and fatigue this spring.. He was found to have AF.     He was noted to be in AF in July.     CV on . Lasted 5 days.     He has a Kardia device. And Apple watch.     He thinks his fatigue and oquendo are worse and he is in AF.     He has low power.       Past Medical History:   Diagnosis Date    Abnormal ECG     Cataract Years ago    Cataracts removed from both eyes May, 2022    Erectile dysfunction     History of basal cell cancer     [place] [facility] [place] Hx states Basal Cell on ear    History of cardiac cath     [facility]    History of degenerative disc disease     DDD    History of foreign travel     [place] ; [place] Oct/Nov    HL (hearing loss) 1996    Permanent tinnitus from whiplash injury    Hyperlipidemia     Hypertension     AURELIA on CPAP 2017    AURELIA npsg Dec 2017 ahi 29 auto cpap 7-14 nasal mask    Osteoarthritis     PAF (paroxysmal atrial fibrillation) 2024    Prostate cancer     Garnett score 6, followed by Dr. Infante, age 59       Social History     Socioeconomic History    Marital status:      Spouse name: Melodie    Number of children: 3    Years of education: College   Tobacco Use    Smoking status: Never     Passive exposure: Never    Smokeless tobacco: Never   Vaping Use    Vaping status: Never Used   Substance and Sexual Activity    Alcohol use: Yes     Alcohol/week: 6.0 standard drinks of alcohol     Types: 4 Glasses of wine, 2 Cans of beer per week     Comment: 4-5 beer or glass wine weekly    Drug use: Never    Sexual activity: Not Currently     Partners: Female     Birth  "control/protection: Vasectomy, Surgical       Family History   Problem Relation Age of Onset    Breast cancer Mother 67         age 70    Cancer Mother     Heart attack Father     Other Father         Amyloidosis    Heart disease Father     Asthma Sister     Heart attack Maternal Grandmother     Cancer Maternal Grandmother     Kidney cancer Maternal Grandfather     Cancer Paternal Grandmother     Diabetes Paternal Grandfather     Cancer Paternal Grandfather     Cancer Other         Breast Cancer    Diabetes Other     Heart disease Other     Hypertension Other     Diverticulosis Other     Other Other         Amyloidosis       Review of Systems   Constitutional: Negative for fever and night sweats.   HENT:  Negative for ear pain and stridor.    Eyes:  Negative for discharge and visual halos.   Cardiovascular:  Negative for cyanosis.   Respiratory:  Negative for hemoptysis and sputum production.    Hematologic/Lymphatic: Negative for adenopathy.   Skin:  Negative for nail changes and unusual hair distribution.   Musculoskeletal:  Positive for arthritis and joint pain. Negative for gout and joint swelling.   Gastrointestinal:  Negative for bowel incontinence and flatus.   Genitourinary:  Negative for dysuria and flank pain.   Neurological:  Negative for seizures and tremors.   Psychiatric/Behavioral:  Negative for altered mental status. The patient is not nervous/anxious.             Objective:     Vitals:    24 1243   BP: 118/82   Pulse: 73   Weight: 92.5 kg (204 lb)   Height: 177.8 cm (70\")         Eyes:      General:         Right eye: No discharge.         Left eye: No discharge.   HENT:      Head: Normocephalic and atraumatic.   Neck:      Thyroid: No thyromegaly.      Vascular: No JVD.   Pulmonary:      Effort: Pulmonary effort is normal.      Breath sounds: Normal breath sounds. No rales.   Cardiovascular:      Normal rate. Irregularly irregular rhythm.      No gallop.    Edema:     Peripheral edema " absent.   Abdominal:      General: Bowel sounds are normal.      Palpations: Abdomen is soft.      Tenderness: There is no abdominal tenderness.   Musculoskeletal: Normal range of motion.         General: No deformity. Skin:     General: Skin is warm and dry.      Findings: No erythema.   Neurological:      Mental Status: Alert and oriented to person, place, and time.      Motor: Normal muscle tone.   Psychiatric:         Behavior: Behavior normal.         Thought Content: Thought content normal.           ECG 12 Lead    Date/Time: 9/9/2024 1:25 PM  Performed by: Kingston Maciel MD    Authorized by: Kingston Maciel MD  Comparison: compared with previous ECG   Similar to previous ECG  Rhythm: atrial fibrillation          Lab Review:       Assessment:          Diagnosis Plan   1. PAF (paroxysmal atrial fibrillation)        2. Coronary artery disease involving native coronary artery of native heart without angina pectoris               Plan:     He has highly symptomatic AF despite rate control.     Rhythm control with amiodarone and CV did not work.     It think ablation is the best choice.     Risks discussed.

## 2024-09-09 NOTE — H&P (VIEW-ONLY)
Date of Office Visit: 2024  Encounter Provider: Kingston Maciel MD  Place of Service: [facility] CARDIOLOGY  Patient Name: Kingston Mancini  : 1950    Subjective:     Encounter Date:2024      Patient ID: Kingston Mancini is a 73 y.o. male who has a cc of self referred for pers AF. He noted oquendo and fatigue this spring.. He was found to have AF.     He was noted to be in AF in July.     CV on . Lasted 5 days.     He has a Kardia device. And Apple watch.     He thinks his fatigue and oquendo are worse and he is in AF.     He has low power.       Past Medical History:   Diagnosis Date    Abnormal ECG     Cataract Years ago    Cataracts removed from both eyes May, 2022    Erectile dysfunction     History of basal cell cancer     Abstraction from [facility] Past Medical Hx states Basal Cell on ear    History of cardiac cath     [facility]    History of degenerative disc disease     DDD    History of foreign travel     [place] ; [place] Oct/Nov    HL (hearing loss) 1996    Permanent tinnitus from whiplash injury    Hyperlipidemia     Hypertension     AURELIA on CPAP 2017    AURELIA npsg Dec 2017 ahi 29 auto cpap 7-14 nasal mask    Osteoarthritis     PAF (paroxysmal atrial fibrillation) 2024    Prostate cancer     Hayward score 6, followed by Dr. Infante, age 59       Social History     Socioeconomic History    Marital status:      Spouse name: Melodie    Number of children: 3    Years of education: College   Tobacco Use    Smoking status: Never     Passive exposure: Never    Smokeless tobacco: Never   Vaping Use    Vaping status: Never Used   Substance and Sexual Activity    Alcohol use: Yes     Alcohol/week: 6.0 standard drinks of alcohol     Types: 4 Glasses of wine, 2 Cans of beer per week     Comment: 4-5 beer or glass wine weekly    Drug use: Never    Sexual activity: Not Currently     Partners: Female     Birth  "control/protection: Vasectomy, Surgical       Family History   Problem Relation Age of Onset    Breast cancer Mother 67         age 70    Cancer Mother     Heart attack Father     Other Father         Amyloidosis    Heart disease Father     Asthma Sister     Heart attack Maternal Grandmother     Cancer Maternal Grandmother     Kidney cancer Maternal Grandfather     Cancer Paternal Grandmother     Diabetes Paternal Grandfather     Cancer Paternal Grandfather     Cancer Other         Breast Cancer    Diabetes Other     Heart disease Other     Hypertension Other     Diverticulosis Other     Other Other         Amyloidosis       Review of Systems   Constitutional: Negative for fever and night sweats.   HENT:  Negative for ear pain and stridor.    Eyes:  Negative for discharge and visual halos.   Cardiovascular:  Negative for cyanosis.   Respiratory:  Negative for hemoptysis and sputum production.    Hematologic/Lymphatic: Negative for adenopathy.   Skin:  Negative for nail changes and unusual hair distribution.   Musculoskeletal:  Positive for arthritis and joint pain. Negative for gout and joint swelling.   Gastrointestinal:  Negative for bowel incontinence and flatus.   Genitourinary:  Negative for dysuria and flank pain.   Neurological:  Negative for seizures and tremors.   Psychiatric/Behavioral:  Negative for altered mental status. The patient is not nervous/anxious.             Objective:     Vitals:    24 1243   BP: 118/82   Pulse: 73   Weight: 92.5 kg (204 lb)   Height: 177.8 cm (70\")         Eyes:      General:         Right eye: No discharge.         Left eye: No discharge.   HENT:      Head: Normocephalic and atraumatic.   Neck:      Thyroid: No thyromegaly.      Vascular: No JVD.   Pulmonary:      Effort: Pulmonary effort is normal.      Breath sounds: Normal breath sounds. No rales.   Cardiovascular:      Normal rate. Irregularly irregular rhythm.      No gallop.    Edema:     Peripheral edema " absent.   Abdominal:      General: Bowel sounds are normal.      Palpations: Abdomen is soft.      Tenderness: There is no abdominal tenderness.   Musculoskeletal: Normal range of motion.         General: No deformity. Skin:     General: Skin is warm and dry.      Findings: No erythema.   Neurological:      Mental Status: Alert and oriented to person, place, and time.      Motor: Normal muscle tone.   Psychiatric:         Behavior: Behavior normal.         Thought Content: Thought content normal.           ECG 12 Lead    Date/Time: 9/9/2024 1:25 PM  Performed by: Kingston Maciel MD    Authorized by: Kingston Maciel MD  Comparison: compared with previous ECG   Similar to previous ECG  Rhythm: atrial fibrillation          Lab Review:       Assessment:          Diagnosis Plan   1. PAF (paroxysmal atrial fibrillation)        2. Coronary artery disease involving native coronary artery of native heart without angina pectoris               Plan:     He has highly symptomatic AF despite rate control.     Rhythm control with amiodarone and CV did not work.     It think ablation is the best choice.     Risks discussed.

## 2024-09-23 ENCOUNTER — LAB (OUTPATIENT)
Dept: LAB | Facility: HOSPITAL | Age: 74
End: 2024-09-23
Payer: MEDICARE

## 2024-09-23 DIAGNOSIS — Z13.6 SCREENING FOR ISCHEMIC HEART DISEASE: ICD-10-CM

## 2024-09-23 DIAGNOSIS — Z01.810 PRE-OPERATIVE CARDIOVASCULAR EXAMINATION: ICD-10-CM

## 2024-09-23 LAB
ANION GAP SERPL CALCULATED.3IONS-SCNC: 12.3 MMOL/L (ref 5–15)
BASOPHILS # BLD AUTO: 0.07 10*3/MM3 (ref 0–0.2)
BASOPHILS NFR BLD AUTO: 1.1 % (ref 0–1.5)
BUN SERPL-MCNC: 24 MG/DL (ref 8–23)
BUN/CREAT SERPL: 19.4 (ref 7–25)
CALCIUM SPEC-SCNC: 10 MG/DL (ref 8.6–10.5)
CHLORIDE SERPL-SCNC: 103 MMOL/L (ref 98–107)
CO2 SERPL-SCNC: 24.7 MMOL/L (ref 22–29)
CREAT SERPL-MCNC: 1.24 MG/DL (ref 0.76–1.27)
DEPRECATED RDW RBC AUTO: 44.1 FL (ref 37–54)
EGFRCR SERPLBLD CKD-EPI 2021: 61.8 ML/MIN/1.73
EOSINOPHIL # BLD AUTO: 0.37 10*3/MM3 (ref 0–0.4)
EOSINOPHIL NFR BLD AUTO: 5.7 % (ref 0.3–6.2)
ERYTHROCYTE [DISTWIDTH] IN BLOOD BY AUTOMATED COUNT: 12.8 % (ref 12.3–15.4)
GLUCOSE SERPL-MCNC: 89 MG/DL (ref 65–99)
HCT VFR BLD AUTO: 54.6 % (ref 37.5–51)
HGB BLD-MCNC: 17.9 G/DL (ref 13–17.7)
IMM GRANULOCYTES # BLD AUTO: 0.04 10*3/MM3 (ref 0–0.05)
IMM GRANULOCYTES NFR BLD AUTO: 0.6 % (ref 0–0.5)
LYMPHOCYTES # BLD AUTO: 1.39 10*3/MM3 (ref 0.7–3.1)
LYMPHOCYTES NFR BLD AUTO: 21.4 % (ref 19.6–45.3)
MCH RBC QN AUTO: 30.9 PG (ref 26.6–33)
MCHC RBC AUTO-ENTMCNC: 32.8 G/DL (ref 31.5–35.7)
MCV RBC AUTO: 94.1 FL (ref 79–97)
MONOCYTES # BLD AUTO: 0.56 10*3/MM3 (ref 0.1–0.9)
MONOCYTES NFR BLD AUTO: 8.6 % (ref 5–12)
NEUTROPHILS NFR BLD AUTO: 4.06 10*3/MM3 (ref 1.7–7)
NEUTROPHILS NFR BLD AUTO: 62.6 % (ref 42.7–76)
NRBC BLD AUTO-RTO: 0 /100 WBC (ref 0–0.2)
PLATELET # BLD AUTO: 280 10*3/MM3 (ref 140–450)
PMV BLD AUTO: 9.6 FL (ref 6–12)
POTASSIUM SERPL-SCNC: 4.6 MMOL/L (ref 3.5–5.2)
RBC # BLD AUTO: 5.8 10*6/MM3 (ref 4.14–5.8)
SODIUM SERPL-SCNC: 140 MMOL/L (ref 136–145)
WBC NRBC COR # BLD AUTO: 6.49 10*3/MM3 (ref 3.4–10.8)

## 2024-09-23 PROCEDURE — 36415 COLL VENOUS BLD VENIPUNCTURE: CPT

## 2024-09-23 PROCEDURE — 80048 BASIC METABOLIC PNL TOTAL CA: CPT

## 2024-09-23 PROCEDURE — 85025 COMPLETE CBC W/AUTO DIFF WBC: CPT

## 2024-10-08 ENCOUNTER — HOSPITAL ENCOUNTER (OUTPATIENT)
Facility: HOSPITAL | Age: 74
Setting detail: HOSPITAL OUTPATIENT SURGERY
Discharge: HOME | End: 2024-10-08
Attending: INTERNAL MEDICINE | Admitting: INTERNAL MEDICINE
Payer: MEDICARE

## 2024-10-08 ENCOUNTER — ANESTHESIA (OUTPATIENT)
Dept: CARDIOLOGY | Facility: HOSPITAL | Age: 74
End: 2024-10-08
Payer: MEDICARE

## 2024-10-08 ENCOUNTER — ANESTHESIA EVENT (OUTPATIENT)
Dept: CARDIOLOGY | Facility: HOSPITAL | Age: 74
End: 2024-10-08
Payer: MEDICARE

## 2024-10-08 VITALS
HEIGHT: 71 IN | WEIGHT: 200 LBS | TEMPERATURE: 97.5 F | SYSTOLIC BLOOD PRESSURE: 122 MMHG | RESPIRATION RATE: 18 BRPM | HEART RATE: 78 BPM | OXYGEN SATURATION: 96 % | BODY MASS INDEX: 28 KG/M2 | DIASTOLIC BLOOD PRESSURE: 70 MMHG

## 2024-10-08 DIAGNOSIS — I48.0 PAF (PAROXYSMAL ATRIAL FIBRILLATION): ICD-10-CM

## 2024-10-08 DIAGNOSIS — I25.10 CORONARY ARTERY DISEASE INVOLVING NATIVE CORONARY ARTERY OF NATIVE HEART WITHOUT ANGINA PECTORIS: ICD-10-CM

## 2024-10-08 LAB
ACT BLD: 287 SECONDS (ref 82–152)
ACT BLD: 287 SECONDS (ref 82–152)
QT INTERVAL: 406 MS
QT INTERVAL: 439 MS
QTC INTERVAL: 460 MS
QTC INTERVAL: 469 MS

## 2024-10-08 PROCEDURE — C1733 CATH, EP, OTHR THAN COOL-TIP: HCPCS | Performed by: INTERNAL MEDICINE

## 2024-10-08 PROCEDURE — C1769 GUIDE WIRE: HCPCS | Performed by: INTERNAL MEDICINE

## 2024-10-08 PROCEDURE — C1732 CATH, EP, DIAG/ABL, 3D/VECT: HCPCS | Performed by: INTERNAL MEDICINE

## 2024-10-08 PROCEDURE — 25010000002 LIDOCAINE-EPINEPHRINE 1 %-1:200000 SOLUTION: Performed by: INTERNAL MEDICINE

## 2024-10-08 PROCEDURE — 25010000002 DEXAMETHASONE PER 1 MG: Performed by: NURSE ANESTHETIST, CERTIFIED REGISTERED

## 2024-10-08 PROCEDURE — 85347 COAGULATION TIME ACTIVATED: CPT

## 2024-10-08 PROCEDURE — 25010000002 PROPOFOL 10 MG/ML EMULSION: Performed by: NURSE ANESTHETIST, CERTIFIED REGISTERED

## 2024-10-08 PROCEDURE — C1893 INTRO/SHEATH, FIXED,NON-PEEL: HCPCS | Performed by: INTERNAL MEDICINE

## 2024-10-08 PROCEDURE — 92960 CARDIOVERSION ELECTRIC EXT: CPT | Performed by: INTERNAL MEDICINE

## 2024-10-08 PROCEDURE — 25010000002 SUGAMMADEX 200 MG/2ML SOLUTION: Performed by: NURSE ANESTHETIST, CERTIFIED REGISTERED

## 2024-10-08 PROCEDURE — 25010000002 DROPERIDOL PER 5 MG: Performed by: NURSE ANESTHETIST, CERTIFIED REGISTERED

## 2024-10-08 PROCEDURE — 25010000002 LIDOCAINE 2% SOLUTION: Performed by: NURSE ANESTHETIST, CERTIFIED REGISTERED

## 2024-10-08 PROCEDURE — 93005 ELECTROCARDIOGRAM TRACING: CPT | Performed by: PHYSICIAN ASSISTANT

## 2024-10-08 PROCEDURE — 25010000002 FENTANYL CITRATE (PF) 50 MCG/ML SOLUTION: Performed by: NURSE ANESTHETIST, CERTIFIED REGISTERED

## 2024-10-08 PROCEDURE — C1894 INTRO/SHEATH, NON-LASER: HCPCS | Performed by: INTERNAL MEDICINE

## 2024-10-08 PROCEDURE — C1759 CATH, INTRA ECHOCARDIOGRAPHY: HCPCS | Performed by: INTERNAL MEDICINE

## 2024-10-08 PROCEDURE — 25010000002 HEPARIN (PORCINE) PER 1000 UNITS: Performed by: INTERNAL MEDICINE

## 2024-10-08 PROCEDURE — C1766 INTRO/SHEATH,STRBLE,NON-PEEL: HCPCS | Performed by: INTERNAL MEDICINE

## 2024-10-08 PROCEDURE — 93656 COMPRE EP EVAL ABLTJ ATR FIB: CPT | Performed by: INTERNAL MEDICINE

## 2024-10-08 PROCEDURE — 25010000002 PHENYLEPHRINE 10 MG/ML SOLUTION 5 ML VIAL: Performed by: NURSE ANESTHETIST, CERTIFIED REGISTERED

## 2024-10-08 PROCEDURE — 93010 ELECTROCARDIOGRAM REPORT: CPT | Performed by: INTERNAL MEDICINE

## 2024-10-08 PROCEDURE — 93005 ELECTROCARDIOGRAM TRACING: CPT | Performed by: INTERNAL MEDICINE

## 2024-10-08 PROCEDURE — 25810000003 SODIUM CHLORIDE 0.9 % SOLUTION: Performed by: INTERNAL MEDICINE

## 2024-10-08 PROCEDURE — 25010000002 PHENYLEPHRINE 10 MG/ML SOLUTION: Performed by: NURSE ANESTHETIST, CERTIFIED REGISTERED

## 2024-10-08 PROCEDURE — 25810000003 SODIUM CHLORIDE 0.9 % SOLUTION 250 ML FLEX CONT: Performed by: NURSE ANESTHETIST, CERTIFIED REGISTERED

## 2024-10-08 RX ORDER — ACETAMINOPHEN 650 MG/1
650 SUPPOSITORY RECTAL EVERY 4 HOURS PRN
Status: DISCONTINUED | OUTPATIENT
Start: 2024-10-08 | End: 2024-10-08 | Stop reason: HOSPADM

## 2024-10-08 RX ORDER — DEXAMETHASONE SODIUM PHOSPHATE 4 MG/ML
INJECTION, SOLUTION INTRA-ARTICULAR; INTRALESIONAL; INTRAMUSCULAR; INTRAVENOUS; SOFT TISSUE AS NEEDED
Status: DISCONTINUED | OUTPATIENT
Start: 2024-10-08 | End: 2024-10-08 | Stop reason: SURG

## 2024-10-08 RX ORDER — ONDANSETRON 2 MG/ML
4 INJECTION INTRAMUSCULAR; INTRAVENOUS ONCE AS NEEDED
Status: DISCONTINUED | OUTPATIENT
Start: 2024-10-08 | End: 2024-10-08 | Stop reason: HOSPADM

## 2024-10-08 RX ORDER — SODIUM CHLORIDE, SODIUM LACTATE, POTASSIUM CHLORIDE, CALCIUM CHLORIDE 600; 310; 30; 20 MG/100ML; MG/100ML; MG/100ML; MG/100ML
9 INJECTION, SOLUTION INTRAVENOUS CONTINUOUS
Status: DISCONTINUED | OUTPATIENT
Start: 2024-10-08 | End: 2024-10-08 | Stop reason: HOSPADM

## 2024-10-08 RX ORDER — FAMOTIDINE 10 MG/ML
INJECTION, SOLUTION INTRAVENOUS AS NEEDED
Status: DISCONTINUED | OUTPATIENT
Start: 2024-10-08 | End: 2024-10-08 | Stop reason: SURG

## 2024-10-08 RX ORDER — ONDANSETRON 2 MG/ML
4 INJECTION INTRAMUSCULAR; INTRAVENOUS EVERY 6 HOURS PRN
Status: DISCONTINUED | OUTPATIENT
Start: 2024-10-08 | End: 2024-10-08 | Stop reason: HOSPADM

## 2024-10-08 RX ORDER — NALOXONE HCL 0.4 MG/ML
0.2 VIAL (ML) INJECTION AS NEEDED
Status: DISCONTINUED | OUTPATIENT
Start: 2024-10-08 | End: 2024-10-08 | Stop reason: HOSPADM

## 2024-10-08 RX ORDER — FENTANYL CITRATE 50 UG/ML
50 INJECTION, SOLUTION INTRAMUSCULAR; INTRAVENOUS
Status: DISCONTINUED | OUTPATIENT
Start: 2024-10-08 | End: 2024-10-08 | Stop reason: HOSPADM

## 2024-10-08 RX ORDER — FAMOTIDINE 10 MG/ML
20 INJECTION, SOLUTION INTRAVENOUS ONCE
Status: DISCONTINUED | OUTPATIENT
Start: 2024-10-08 | End: 2024-10-08 | Stop reason: HOSPADM

## 2024-10-08 RX ORDER — LIDOCAINE HYDROCHLORIDE 20 MG/ML
INJECTION, SOLUTION INFILTRATION; PERINEURAL AS NEEDED
Status: DISCONTINUED | OUTPATIENT
Start: 2024-10-08 | End: 2024-10-08 | Stop reason: SURG

## 2024-10-08 RX ORDER — DROPERIDOL 2.5 MG/ML
0.62 INJECTION, SOLUTION INTRAMUSCULAR; INTRAVENOUS
Status: DISCONTINUED | OUTPATIENT
Start: 2024-10-08 | End: 2024-10-08 | Stop reason: HOSPADM

## 2024-10-08 RX ORDER — SODIUM CHLORIDE 0.9 % (FLUSH) 0.9 %
3-10 SYRINGE (ML) INJECTION AS NEEDED
Status: DISCONTINUED | OUTPATIENT
Start: 2024-10-08 | End: 2024-10-08 | Stop reason: HOSPADM

## 2024-10-08 RX ORDER — HEPARIN SODIUM 1000 [USP'U]/ML
INJECTION, SOLUTION INTRAVENOUS; SUBCUTANEOUS
Status: DISCONTINUED | OUTPATIENT
Start: 2024-10-08 | End: 2024-10-08 | Stop reason: HOSPADM

## 2024-10-08 RX ORDER — SODIUM CHLORIDE 0.9 % (FLUSH) 0.9 %
10 SYRINGE (ML) INJECTION AS NEEDED
Status: DISCONTINUED | OUTPATIENT
Start: 2024-10-08 | End: 2024-10-08 | Stop reason: HOSPADM

## 2024-10-08 RX ORDER — HYDROMORPHONE HYDROCHLORIDE 1 MG/ML
0.5 INJECTION, SOLUTION INTRAMUSCULAR; INTRAVENOUS; SUBCUTANEOUS
Status: DISCONTINUED | OUTPATIENT
Start: 2024-10-08 | End: 2024-10-08 | Stop reason: HOSPADM

## 2024-10-08 RX ORDER — EPHEDRINE SULFATE 50 MG/ML
5 INJECTION, SOLUTION INTRAVENOUS ONCE AS NEEDED
Status: DISCONTINUED | OUTPATIENT
Start: 2024-10-08 | End: 2024-10-08 | Stop reason: HOSPADM

## 2024-10-08 RX ORDER — FENTANYL CITRATE 50 UG/ML
50 INJECTION, SOLUTION INTRAMUSCULAR; INTRAVENOUS ONCE AS NEEDED
Status: DISCONTINUED | OUTPATIENT
Start: 2024-10-08 | End: 2024-10-08 | Stop reason: HOSPADM

## 2024-10-08 RX ORDER — PROPOFOL 10 MG/ML
VIAL (ML) INTRAVENOUS AS NEEDED
Status: DISCONTINUED | OUTPATIENT
Start: 2024-10-08 | End: 2024-10-08 | Stop reason: SURG

## 2024-10-08 RX ORDER — LIDOCAINE HYDROCHLORIDE 10 MG/ML
0.5 INJECTION, SOLUTION INFILTRATION; PERINEURAL ONCE AS NEEDED
Status: DISCONTINUED | OUTPATIENT
Start: 2024-10-08 | End: 2024-10-08 | Stop reason: HOSPADM

## 2024-10-08 RX ORDER — PROMETHAZINE HYDROCHLORIDE 25 MG/1
25 SUPPOSITORY RECTAL ONCE AS NEEDED
Status: DISCONTINUED | OUTPATIENT
Start: 2024-10-08 | End: 2024-10-08 | Stop reason: HOSPADM

## 2024-10-08 RX ORDER — DROPERIDOL 2.5 MG/ML
INJECTION, SOLUTION INTRAMUSCULAR; INTRAVENOUS AS NEEDED
Status: DISCONTINUED | OUTPATIENT
Start: 2024-10-08 | End: 2024-10-08 | Stop reason: SURG

## 2024-10-08 RX ORDER — OXYCODONE AND ACETAMINOPHEN 7.5; 325 MG/1; MG/1
1 TABLET ORAL EVERY 4 HOURS PRN
Status: DISCONTINUED | OUTPATIENT
Start: 2024-10-08 | End: 2024-10-08 | Stop reason: HOSPADM

## 2024-10-08 RX ORDER — EPHEDRINE SULFATE 50 MG/ML
INJECTION, SOLUTION INTRAVENOUS AS NEEDED
Status: DISCONTINUED | OUTPATIENT
Start: 2024-10-08 | End: 2024-10-08 | Stop reason: SURG

## 2024-10-08 RX ORDER — MIDAZOLAM HYDROCHLORIDE 1 MG/ML
0.5 INJECTION INTRAMUSCULAR; INTRAVENOUS
Status: DISCONTINUED | OUTPATIENT
Start: 2024-10-08 | End: 2024-10-08 | Stop reason: HOSPADM

## 2024-10-08 RX ORDER — PROMETHAZINE HYDROCHLORIDE 12.5 MG/1
25 TABLET ORAL ONCE AS NEEDED
Status: DISCONTINUED | OUTPATIENT
Start: 2024-10-08 | End: 2024-10-08 | Stop reason: HOSPADM

## 2024-10-08 RX ORDER — IPRATROPIUM BROMIDE AND ALBUTEROL SULFATE 2.5; .5 MG/3ML; MG/3ML
3 SOLUTION RESPIRATORY (INHALATION) ONCE AS NEEDED
Status: DISCONTINUED | OUTPATIENT
Start: 2024-10-08 | End: 2024-10-08 | Stop reason: HOSPADM

## 2024-10-08 RX ORDER — LABETALOL HYDROCHLORIDE 5 MG/ML
5 INJECTION, SOLUTION INTRAVENOUS
Status: DISCONTINUED | OUTPATIENT
Start: 2024-10-08 | End: 2024-10-08 | Stop reason: HOSPADM

## 2024-10-08 RX ORDER — HYDRALAZINE HYDROCHLORIDE 20 MG/ML
5 INJECTION INTRAMUSCULAR; INTRAVENOUS
Status: DISCONTINUED | OUTPATIENT
Start: 2024-10-08 | End: 2024-10-08 | Stop reason: HOSPADM

## 2024-10-08 RX ORDER — PROPOFOL 10 MG/ML
VIAL (ML) INTRAVENOUS CONTINUOUS PRN
Status: DISCONTINUED | OUTPATIENT
Start: 2024-10-08 | End: 2024-10-08 | Stop reason: SURG

## 2024-10-08 RX ORDER — SODIUM CHLORIDE 9 MG/ML
INJECTION, SOLUTION INTRAVENOUS
Status: COMPLETED | OUTPATIENT
Start: 2024-10-08 | End: 2024-10-08

## 2024-10-08 RX ORDER — SODIUM CHLORIDE 0.9 % (FLUSH) 0.9 %
10 SYRINGE (ML) INJECTION EVERY 12 HOURS SCHEDULED
Status: DISCONTINUED | OUTPATIENT
Start: 2024-10-08 | End: 2024-10-08 | Stop reason: HOSPADM

## 2024-10-08 RX ORDER — SODIUM CHLORIDE 0.9 % (FLUSH) 0.9 %
3 SYRINGE (ML) INJECTION EVERY 12 HOURS SCHEDULED
Status: DISCONTINUED | OUTPATIENT
Start: 2024-10-08 | End: 2024-10-08 | Stop reason: HOSPADM

## 2024-10-08 RX ORDER — ROCURONIUM BROMIDE 10 MG/ML
INJECTION, SOLUTION INTRAVENOUS AS NEEDED
Status: DISCONTINUED | OUTPATIENT
Start: 2024-10-08 | End: 2024-10-08 | Stop reason: SURG

## 2024-10-08 RX ORDER — PHENYLEPHRINE HYDROCHLORIDE 10 MG/ML
INJECTION INTRAVENOUS AS NEEDED
Status: DISCONTINUED | OUTPATIENT
Start: 2024-10-08 | End: 2024-10-08 | Stop reason: SURG

## 2024-10-08 RX ORDER — FENTANYL CITRATE 50 UG/ML
INJECTION, SOLUTION INTRAMUSCULAR; INTRAVENOUS AS NEEDED
Status: DISCONTINUED | OUTPATIENT
Start: 2024-10-08 | End: 2024-10-08 | Stop reason: SURG

## 2024-10-08 RX ORDER — HYDROCODONE BITARTRATE AND ACETAMINOPHEN 5; 325 MG/1; MG/1
1 TABLET ORAL ONCE AS NEEDED
Status: DISCONTINUED | OUTPATIENT
Start: 2024-10-08 | End: 2024-10-08 | Stop reason: HOSPADM

## 2024-10-08 RX ORDER — DIPHENHYDRAMINE HYDROCHLORIDE 50 MG/ML
12.5 INJECTION INTRAMUSCULAR; INTRAVENOUS
Status: DISCONTINUED | OUTPATIENT
Start: 2024-10-08 | End: 2024-10-08 | Stop reason: HOSPADM

## 2024-10-08 RX ORDER — FLUMAZENIL 0.1 MG/ML
0.2 INJECTION INTRAVENOUS AS NEEDED
Status: DISCONTINUED | OUTPATIENT
Start: 2024-10-08 | End: 2024-10-08 | Stop reason: HOSPADM

## 2024-10-08 RX ORDER — SODIUM CHLORIDE 9 MG/ML
50 INJECTION, SOLUTION INTRAVENOUS CONTINUOUS
Status: DISCONTINUED | OUTPATIENT
Start: 2024-10-08 | End: 2024-10-08 | Stop reason: HOSPADM

## 2024-10-08 RX ORDER — ACETAMINOPHEN 325 MG/1
650 TABLET ORAL EVERY 4 HOURS PRN
Status: DISCONTINUED | OUTPATIENT
Start: 2024-10-08 | End: 2024-10-08 | Stop reason: HOSPADM

## 2024-10-08 RX ADMIN — PHENYLEPHRINE HYDROCHLORIDE 0.8 MCG/KG/MIN: 10 INJECTION, SOLUTION INTRAVENOUS at 11:55

## 2024-10-08 RX ADMIN — DEXAMETHASONE SODIUM PHOSPHATE 10 MG: 4 INJECTION, SOLUTION INTRA-ARTICULAR; INTRALESIONAL; INTRAMUSCULAR; INTRAVENOUS; SOFT TISSUE at 11:40

## 2024-10-08 RX ADMIN — EPHEDRINE SULFATE 10 MG: 50 INJECTION INTRAVENOUS at 12:05

## 2024-10-08 RX ADMIN — SUGAMMADEX 200 MG: 100 INJECTION, SOLUTION INTRAVENOUS at 12:56

## 2024-10-08 RX ADMIN — ROCURONIUM BROMIDE 20 MG: 10 INJECTION, SOLUTION INTRAVENOUS at 12:23

## 2024-10-08 RX ADMIN — FAMOTIDINE 20 MG: 10 INJECTION INTRAVENOUS at 12:12

## 2024-10-08 RX ADMIN — PROPOFOL 150 MG: 10 INJECTION, EMULSION INTRAVENOUS at 11:29

## 2024-10-08 RX ADMIN — DROPERIDOL 0.62 MG: 2.5 INJECTION, SOLUTION INTRAMUSCULAR; INTRAVENOUS at 11:40

## 2024-10-08 RX ADMIN — FENTANYL CITRATE 50 MCG: 50 INJECTION, SOLUTION INTRAMUSCULAR; INTRAVENOUS at 11:29

## 2024-10-08 RX ADMIN — PHENYLEPHRINE HYDROCHLORIDE 100 MCG: 10 INJECTION INTRAVENOUS at 11:43

## 2024-10-08 RX ADMIN — SODIUM CHLORIDE 50 ML/HR: 9 INJECTION, SOLUTION INTRAVENOUS at 10:07

## 2024-10-08 RX ADMIN — LIDOCAINE HYDROCHLORIDE 80 MG: 20 INJECTION, SOLUTION INFILTRATION; PERINEURAL at 11:29

## 2024-10-08 RX ADMIN — Medication 25 MCG/KG/MIN: at 11:35

## 2024-10-08 RX ADMIN — ROCURONIUM BROMIDE 50 MG: 10 INJECTION, SOLUTION INTRAVENOUS at 11:30

## 2024-10-08 NOTE — Clinical Note
The DP pulses are +2 bilaterally. The PT pulses are +2 bilaterally. No redness noted martha heels. Feet elevated on pillow. Clear sight finger cuff used for hemodynamic monitoring. [place] warming blanket on at 43 degrees C

## 2024-10-08 NOTE — ANESTHESIA PROCEDURE NOTES
Airway  Urgency: elective    Date/Time: 10/8/2024 11:33 AM  Airway not difficult    General Information and Staff    Patient location during procedure: OR  CRNA/CAA: Radha Ramirez CRNA    Indications and Patient Condition    Preoxygenated: yes  Mask difficulty assessment: 1 - vent by mask    Final Airway Details  Final airway type: endotracheal airway      Successful airway: ETT  Cuffed: yes   Successful intubation technique: video laryngoscopy  Endotracheal tube insertion site: oral  Blade: CMAC  Blade size: D  ETT size (mm): 7.5  Cormack-Lehane Classification: grade I - full view of glottis  Placement verified by: chest auscultation and capnometry   Measured from: teeth  ETT/EBT  to teeth (cm): 21  Number of attempts at approach: 1  Assessment: lips, teeth, and gum same as pre-op and atraumatic intubation    Additional Comments  Teeth, tongue, lips, and gums in preop condition. VSS throughout. Easy mask/smooth easy airway w/CMAC. Tube visualized through cords. CMAC used for suspected diff. Airway.

## 2024-10-08 NOTE — Clinical Note
Anesthesia present entire case .See anesthesia record for all sedation, vital sign, and event documentation

## 2024-10-08 NOTE — ANESTHESIA PREPROCEDURE EVALUATION
Anesthesia Evaluation     NPO Solid Status: > 8 hours             Airway   Mallampati: III  Narrow palate and Small opening  Dental      Pulmonary    (+) ,sleep apnea on CPAP  (-) asthma, not a smoker    ROS comment: Positive AURELIA screen/Positive AURELIA diagnosis and 2 or more mitigating factors used (recovery in non-supine position and multimodal analgesia)    Cardiovascular     (+) hypertension, dysrhythmias Atrial Fib, hyperlipidemia      Neuro/Psych  GI/Hepatic/Renal/Endo    (+) GERD    Musculoskeletal     Abdominal    Substance History      OB/GYN          Other                    Anesthesia Plan    ASA 3     general       Anesthetic plan, risks, benefits, and alternatives have been provided, discussed and informed consent has been obtained with: patient.    CODE STATUS:

## 2024-10-08 NOTE — Clinical Note
Hemostasis started on the right femoral vein. Figure 8 suturing was used in achieving hemostasis. Closure device deployed in the vessel. Hemostasis achieved successfully. Closure device additional comment: Figure 8 stitch and stopcock

## 2024-10-08 NOTE — Clinical Note
Hemostasis started on the left femoral vein. Figure 8 suturing was used in achieving hemostasis. Closure device deployed in the vessel. Hemostasis achieved successfully. Closure device additional comment: Figure 8 stitch and stopcock

## 2024-10-08 NOTE — DISCHARGE INSTRUCTIONS
[facility]  Cardiology  [address] [place] [address]  [phone]    Coronary Ablation After Care    Refer to this sheet in the next few weeks. These instructions provide you with information on caring for yourself after your procedure. Your health care provider may also give you more specific instructions. Your treatment has been planned according to current medical practices, but problems sometimes occur. Call your health care provider if you have any problems or questions after your procedure.      What to Expect After the Procedure:  After your procedure, it is typical to have the following sensations:  Minor discomfort or tenderness and a small bump at the catheter insertion site(s). The bump(s) should usually decrease in size and tenderness within 1 to 2 weeks.  Any bruising will usually fade within 2 to 4 weeks.  Home Care Instructions:  Do not apply powder or lotion to the site.  Do not take baths, swim, or use a hot tub until your health care provider approves and the site is completely healed.  Do not bend, squat, or lift anything over 20 lb (9 kg) or as directed by your health care provider. However, we recommend lifting nothing heavier than a gallon of milk.    You may shower 24 hours after the procedure. Remove the bandage (dressing) and gently wash the site with plain soap and water. Gently pat the site dry. You may apply a band aid daily for 2 days if desired.    Inspect the site at least twice daily.  Increase your fluid intake for the next 2 days.    Limit your activity for the first 48 hours.   Avoid strenuous activity for 1 week or as advised by your physician.    Follow instructions about when you can drive or return to work as directed by your physician.    Hold direct pressure over the site when you cough, sneeze, laugh or change positions.  Do this for the next 2 days.    Call Your Doctor If:  You have drainage (other than a small amount of blood on the dressing).  You  have chills or a fever > 101.  You have redness, warmth, swelling (larger than a walnut), or pain at the insertion   You develop palpitations, chest pain or shortness of breath, feel faint, or pass out.  You develop pain, discoloration, coldness, numbness, tingling, or severe bruising in the leg that held the catheter.  You develop bleeding from any other place, such as the bowels.  You have heavy bleeding from the site.  If this happens, hold pressure on the site and call 911.        Make Sure You:  Understand these instructions.  Will watch your condition.  Will get help right away if you are not doing well or get worse.

## 2024-10-09 RX ORDER — ASPIRIN 81 MG/1
81 TABLET, COATED ORAL DAILY
Qty: 90 TABLET | Refills: 2 | Status: SHIPPED | OUTPATIENT
Start: 2024-10-09

## 2024-10-10 DIAGNOSIS — K21.9 GASTRO-ESOPHAGEAL REFLUX DISEASE WITHOUT ESOPHAGITIS: ICD-10-CM

## 2024-10-12 NOTE — ANESTHESIA POSTPROCEDURE EVALUATION
"Patient: Kingston Mancini    Procedure Summary       Date: 10/08/24 Room / Location: [place] [address] /  [facility] CATH INVASIVE LOCATION    Anesthesia Start: 1122 Anesthesia Stop: 1315    Procedures:       Ablation atrial fibrillation pfa      3D Mapping EP Diagnosis:       PAF (paroxysmal atrial fibrillation)      Coronary artery disease involving native coronary artery of native heart without angina pectoris      (aF)    Providers: Kingston Maciel MD Provider: Joyce Johnson MD    Anesthesia Type: general ASA Status: 3            Anesthesia Type: general    Vitals  Vitals Value Taken Time   /70 10/08/24 1815   Temp 36.4 °C (97.5 °F) 10/08/24 1311   Pulse 78 10/08/24 1835   Resp 18 10/08/24 1835   SpO2 96 % 10/08/24 1835           Post Anesthesia Care and Evaluation    Level of consciousness: awake and alert  Pain management: adequate    Airway patency: patent  Anesthetic complications: No anesthetic complications  PONV Status: none  Cardiovascular status: acceptable  Respiratory status: acceptable  Hydration status: acceptable    Comments: /70   Pulse 78   Temp 36.4 °C (97.5 °F) (Temporal)   Resp 18   Ht 180.3 cm (71\")   Wt 90.7 kg (200 lb)   SpO2 96%   BMI 27.89 kg/m²           "

## 2024-11-01 ENCOUNTER — OFFICE VISIT (OUTPATIENT)
Age: 74
End: 2024-11-01
Payer: MEDICARE

## 2024-11-01 VITALS
DIASTOLIC BLOOD PRESSURE: 90 MMHG | HEART RATE: 60 BPM | SYSTOLIC BLOOD PRESSURE: 132 MMHG | BODY MASS INDEX: 27.58 KG/M2 | WEIGHT: 197 LBS | HEIGHT: 71 IN

## 2024-11-01 DIAGNOSIS — I48.0 PAF (PAROXYSMAL ATRIAL FIBRILLATION): Primary | ICD-10-CM

## 2024-11-01 DIAGNOSIS — Z98.890 HISTORY OF CARDIAC RADIOFREQUENCY ABLATION: ICD-10-CM

## 2024-11-01 DIAGNOSIS — I10 PRIMARY HYPERTENSION: ICD-10-CM

## 2024-11-01 DIAGNOSIS — I25.10 CORONARY ARTERY DISEASE INVOLVING NATIVE CORONARY ARTERY OF NATIVE HEART WITHOUT ANGINA PECTORIS: ICD-10-CM

## 2024-11-01 PROBLEM — G47.33 OBSTRUCTIVE SLEEP APNEA: Status: RESOLVED | Noted: 2024-05-08 | Resolved: 2024-11-01

## 2024-11-01 PROBLEM — I49.1 PREMATURE ATRIAL COMPLEX: Status: RESOLVED | Noted: 2024-05-03 | Resolved: 2024-11-01

## 2024-11-01 PROBLEM — R06.02 EXERTIONAL SHORTNESS OF BREATH: Status: RESOLVED | Noted: 2024-05-03 | Resolved: 2024-11-01

## 2024-11-01 PROCEDURE — 1159F MED LIST DOCD IN RCRD: CPT | Performed by: PHYSICIAN ASSISTANT

## 2024-11-01 PROCEDURE — 99214 OFFICE O/P EST MOD 30 MIN: CPT | Performed by: PHYSICIAN ASSISTANT

## 2024-11-01 PROCEDURE — 1160F RVW MEDS BY RX/DR IN RCRD: CPT | Performed by: PHYSICIAN ASSISTANT

## 2024-11-01 PROCEDURE — 3075F SYST BP GE 130 - 139MM HG: CPT | Performed by: PHYSICIAN ASSISTANT

## 2024-11-01 PROCEDURE — 3080F DIAST BP >= 90 MM HG: CPT | Performed by: PHYSICIAN ASSISTANT

## 2024-11-01 PROCEDURE — 93000 ELECTROCARDIOGRAM COMPLETE: CPT | Performed by: PHYSICIAN ASSISTANT

## 2024-11-01 NOTE — PROGRESS NOTES
"      ELECTROPHYSIOLOGY   Date of Office Visit: 2024  Patient Name: Kingston Mancini  : 1950  Encounter Provider: Maldonado Galdamez PA-C  Primary Cardiologist: Dr. Bertrand   Electrophysiologist: Dr. Maciel  CHIEF COMPLAINT / REASON FOR OFFICE VISIT     PAF  1 month post ablation     HISTORY OF PRESENT ILLNESS     This is a 74 y.o. year old male who presents to [facility] CARDIOLOGY for a for a procedure follow up.     He has a history of CAD with prior heart cath 2024 that showed a 80-90% stenosis of the LAD. PCI was attempted  but balloon could not cross lesion. He has been on medical therapy since.     He was noted to have new onset atrial fibrillation in 2024. He did not have palpitations but had fatigue and noted an irregular heart rate.     He was started on amiodarone and had a ELENITA/CV by Dr. Bertrand and he referred himself to our office for evaluation. An ablation was recommended.     S/p PVI with PFA 10/8/2024    Today the patient reports he has been doing well post op. He has more energy and is getting back to working out. His heart rates have been stable and he has not felt any AF.     He denies any chest pain, dizziness, near syncope or symptoms of volume overload.     On Eliquis 5 mg twice daily for AC. Denies bleeding complications.     He is having a nose procedure and a hand procedure in December, local anesthetics. He is able to hold AC 2 days prior and resume post op per doctor recommendations.     PMHx:  CAD, persistent AF s/p PFA ablation, HTN, GERD, AURELIA    PHYSICAL EXAMINATION     Vital Signs:  /90 (BP Location: Right arm, Patient Position: Sitting)   Pulse 60   Ht 180.3 cm (71\")   Wt 89.4 kg (197 lb)   BMI 27.48 kg/m²   Estimated body mass index is 27.48 kg/m² as calculated from the following:    Height as of this encounter: 180.3 cm (71\").    Weight as of this encounter: 89.4 kg (197 lb).               Physical Exam  Constitutional:       Appearance: " Normal appearance.   HENT:      Head: Normocephalic and atraumatic.   Cardiovascular:      Rate and Rhythm: Normal rate and regular rhythm.      Pulses: Normal pulses.      Heart sounds: Normal heart sounds.   Pulmonary:      Effort: Pulmonary effort is normal.      Breath sounds: Normal breath sounds.   Musculoskeletal:      Right lower leg: No edema.      Left lower leg: No edema.   Skin:     General: Skin is warm and dry.   Neurological:      General: No focal deficit present.      Mental Status: He is alert and oriented to person, place, and time.          Cardiac Testing/Results     Cardiac Testing:   - Echo 5/2024: EF 61-65% with normal diastolic function. Mild calcification of AV. Mild dilation of ascending aorta at 3.7 cm.    Result Review :  The following data was reviewed by: Maldonado Galdamez PA-C on 11/01/2024:    Lipid Panel          12/11/2023    08:49   Lipid Panel   Total Cholesterol 158    Triglycerides 122    HDL Cholesterol 44    VLDL Cholesterol 22    LDL Cholesterol  92    LDL/HDL Ratio 2.04       Lab Results   Component Value Date     09/23/2024     09/06/2024    K 4.6 09/23/2024    K 4.0 09/06/2024     09/23/2024     09/06/2024    CO2 24.7 09/23/2024    CO2 23.0 09/06/2024    BUN 24 (H) 09/23/2024    BUN 30 (H) 09/06/2024    CREATININE 1.24 09/23/2024    CREATININE 1.19 09/06/2024    EGFRIFNONA 67 10/26/2021    EGFRIFNONA 64 10/06/2020    EGFRIFAFRI 78 10/26/2016    GLUCOSE 89 09/23/2024    GLUCOSE 147 (H) 09/06/2024    CALCIUM 10.0 09/23/2024    CALCIUM 9.7 09/06/2024    ALBUMIN 4.8 12/11/2023    ALBUMIN 4.80 10/17/2022    AST 22 12/11/2023    AST 24 10/17/2022    ALT 22 12/11/2023    ALT 22 10/17/2022     Lab Results   Component Value Date    WBC 6.49 09/23/2024    WBC 6.31 07/20/2024    HGB 17.9 (H) 09/23/2024    HGB 16.1 07/20/2024    HCT 54.6 (H) 09/23/2024    HCT 48.8 07/20/2024    MCV 94.1 09/23/2024    MCV 90.9 07/20/2024     09/23/2024      "07/20/2024     Lab Results   Component Value Date    PROBNP 120.0 05/01/2024     No results found for: \"CKTOTAL\", \"CKMB\", \"CKMBINDEX\", \"TROPONINI\", \"TROPONINT\"  Lab Results   Component Value Date    TSH 1.550 09/06/2024    TSH 0.194 (L) 05/01/2024                 ECG 12 Lead    Date/Time: 11/1/2024 9:46 AM  Performed by: Maldonado Novak PA-C    Authorized by: Maldonado Novak PA-C  Comparison: compared with previous ECG from 10/8/2024  Rhythm: sinus rhythm  Rate: normal  BPM: 60  ST Segments: ST segments normal  T Waves: T waves normal  QRS axis: normal    Clinical impression: normal ECG  Comments: Qtc 428                ASSESSMENT & PLAN       Diagnoses and all orders for this visit:    1. PAF (paroxysmal atrial fibrillation) (Primary)  He has not had any AF post ablation, in SR on EKG with qtc 428  He is able to stop amiodarone 200 mg daily  Continue apixaban 5 mg twice daily, denies bleeding complications.   New Rx was given to him to send to his pharmacy   If any new episodes then contact our office, we will se in 3 months for follow up  2. s/p PVI with PFA 10/2024  Tolerated procedure well, he has not had any AF per him and his apple watch since the procedure  3. Coronary artery disease involving native coronary artery of native heart without angina pectoris  Known chronic LAD occlusion at 80-90%, was not able to have PCI as lesion was not able to be crossed. Primary managing with medical therapy.   Continue Imdur, statin and ASA 81 mg daily   4. Primary hypertension  Blood pressure today is controlled, he will need to keep a log of this at home. Continue amlodipine.       Follow Up:  Return for 2.5 month visit with Dr. Maciel .  Patient was given instructions and counseling regarding his condition or for health maintenance advice. Please contact office if worsening symptoms or proceed to ER when appropriate.      Maldonado Novak PA-C  11/01/24  10:24 EDT    MEDICATIONS         Discharge " Medications            Accurate as of November 1, 2024 10:24 AM. If you have any questions, ask your nurse or doctor.                Changes to Medications        Instructions Start Date   amLODIPine 2.5 MG tablet  Commonly known as: NORVASC  What changed: when to take this   2.5 mg, Oral, Daily             Continue These Medications        Instructions Start Date   apixaban 5 MG tablet tablet  Commonly known as: ELIQUIS   5 mg, Oral, 2 Times Daily      Aspirin Low Dose 81 MG EC tablet  Generic drug: aspirin   81 mg, Oral, Daily      calcium carbonate 600 MG tablet  Commonly known as: OS-LAURYN   600 mg, Oral, Daily      ICAPS AREDS 2 PO   No dose, route, or frequency recorded.      isosorbide mononitrate 30 MG 24 hr tablet  Commonly known as: IMDUR   30 mg, Oral, Daily      loratadine 10 MG tablet  Commonly known as: CLARITIN   10 mg, Oral, Daily      omeprazole 20 MG capsule  Commonly known as: priLOSEC   20 mg, Oral, Daily      PROBIOTIC-10 PO   1 tablet, Oral, Daily      rosuvastatin 10 MG tablet  Commonly known as: Crestor   10 mg, Oral, Daily      Vitamin D3 50 MCG (2000 UT) tablet   VITAMIN D3 2000 UNIT TABS                   **Dragon Disclaimer: This note was dictated using an electronic transcription. The electronic translation of spoken language may permit erroneous, or at times, nonsensical words or phrases to be inadvertently transcribed. Although I have reviewed the note for such errors, some may still exist.

## 2024-11-23 DIAGNOSIS — I25.118 CORONARY ARTERY DISEASE OF NATIVE ARTERY OF NATIVE HEART WITH STABLE ANGINA PECTORIS: ICD-10-CM

## 2024-11-23 RX ORDER — ROSUVASTATIN CALCIUM 10 MG/1
10 TABLET, COATED ORAL DAILY
Qty: 90 TABLET | Refills: 1 | Status: SHIPPED | OUTPATIENT
Start: 2024-11-23

## 2024-11-26 NOTE — TELEPHONE ENCOUNTER
----- Message from Kingston Mancini sent at 11/15/2023 11:40 AM EST -----  Regarding: For Pattie Cruz  Contact: [phone]  Leonidas Blankenship  Thank you for taking care of getting my stuff to [facility].  But there wasn’t an order for a new CPAP.  Would you mind sending that? My old one has some issues.   This note was copied from a baby's chart.  Problem:    Breastfeeding Problem r/t  infant      Intervention:     Pumping Session Observed  Nipple flange measurement:   18 mm   Pumping Education Completed.    Reviewed principles of milk production and importance of frequent, adequate stimulation.  Including: average timeframe for mature milk to start “coming in” is about 3-5 days with consistent stimulation.   Encouraged to pump at least 8 sessions in 24hrs.   Reviewed properties of colostrum and how the thickness makes it difficult for the electric breast pump to extract the thicker droplets. Encouraged to manually massage and compress during pumping.    Instructed to use electric pump on preemie setting and then use hand pump for a few minutes on each side.  Instructed to switch to manual setting when pumping 20ml x3 or 5 days whichever is sooner.  Instructed to adjust suction level to \"max comfort suction level\"  Has supplies and labels at bedside and encouraged to notify staff if breast milk needs to be stored.           Evaluation:   Mom pumping every 3 hours   Able to express small amount of colostrum with hand pump  NICU pumping log given     Plan:     Lactation to continue to follow       Encouraged to call for any other questions or concerns.      Time Spent: 20min

## 2024-12-05 ENCOUNTER — TELEPHONE (OUTPATIENT)
Dept: CARDIOLOGY | Facility: CLINIC | Age: 74
End: 2024-12-05
Payer: MEDICARE

## 2024-12-05 NOTE — TELEPHONE ENCOUNTER
Izzy with Dr. Hinton's office at [facility] called.  Pt is scheduled to have surgery on 12/10- a L ring finger surgery that involves regional anesthetic and a nerve block.    They want to know if pt is clear and how long he can hold OAC.  They prefer 3-5 days since the nerve block is close to an artery.    I reviewed the following message with her from LOV note.  They were unaware of this information:        Pt is still clear and can hold OAC for 2 days?    Callback is [phone], ext [phone]  Fax is [phone]    Thank you,    Glory HOLLINGSWORTH , TRAMAINE  [place] [facility] [place]  12/05/24 09:48 EST

## 2024-12-13 ENCOUNTER — LAB (OUTPATIENT)
Dept: FAMILY MEDICINE CLINIC | Facility: CLINIC | Age: 74
End: 2024-12-13
Payer: MEDICARE

## 2024-12-13 ENCOUNTER — OFFICE VISIT (OUTPATIENT)
Dept: FAMILY MEDICINE CLINIC | Facility: CLINIC | Age: 74
End: 2024-12-13
Payer: MEDICARE

## 2024-12-13 VITALS
OXYGEN SATURATION: 96 % | HEIGHT: 71 IN | TEMPERATURE: 98.2 F | DIASTOLIC BLOOD PRESSURE: 81 MMHG | WEIGHT: 205.6 LBS | SYSTOLIC BLOOD PRESSURE: 132 MMHG | RESPIRATION RATE: 16 BRPM | HEART RATE: 62 BPM | BODY MASS INDEX: 28.78 KG/M2

## 2024-12-13 DIAGNOSIS — Z01.818 PREOP TESTING: ICD-10-CM

## 2024-12-13 DIAGNOSIS — E78.2 MIXED HYPERLIPIDEMIA: ICD-10-CM

## 2024-12-13 DIAGNOSIS — Z00.00 MEDICARE ANNUAL WELLNESS VISIT, SUBSEQUENT: Primary | ICD-10-CM

## 2024-12-13 DIAGNOSIS — I10 PRIMARY HYPERTENSION: ICD-10-CM

## 2024-12-13 LAB
ALBUMIN SERPL-MCNC: 4.4 G/DL (ref 3.5–5.2)
ALBUMIN/GLOB SERPL: 1.5 G/DL
ALP SERPL-CCNC: 78 U/L (ref 39–117)
ALT SERPL W P-5'-P-CCNC: 27 U/L (ref 1–41)
ANION GAP SERPL CALCULATED.3IONS-SCNC: 9.5 MMOL/L (ref 5–15)
APTT PPP: 28.3 SECONDS (ref 22.7–35.4)
AST SERPL-CCNC: 25 U/L (ref 1–40)
BASOPHILS # BLD AUTO: 0.06 10*3/MM3 (ref 0–0.2)
BASOPHILS NFR BLD AUTO: 0.9 % (ref 0–1.5)
BILIRUB SERPL-MCNC: 0.7 MG/DL (ref 0–1.2)
BUN SERPL-MCNC: 26 MG/DL (ref 8–23)
BUN/CREAT SERPL: 21.5 (ref 7–25)
CALCIUM SPEC-SCNC: 9.9 MG/DL (ref 8.6–10.5)
CHLORIDE SERPL-SCNC: 102 MMOL/L (ref 98–107)
CHOLEST SERPL-MCNC: 114 MG/DL (ref 0–200)
CO2 SERPL-SCNC: 27.5 MMOL/L (ref 22–29)
CREAT SERPL-MCNC: 1.21 MG/DL (ref 0.76–1.27)
DEPRECATED RDW RBC AUTO: 41.3 FL (ref 37–54)
EGFRCR SERPLBLD CKD-EPI 2021: 63.2 ML/MIN/1.73
EOSINOPHIL # BLD AUTO: 0.34 10*3/MM3 (ref 0–0.4)
EOSINOPHIL NFR BLD AUTO: 4.9 % (ref 0.3–6.2)
ERYTHROCYTE [DISTWIDTH] IN BLOOD BY AUTOMATED COUNT: 12 % (ref 12.3–15.4)
GLOBULIN UR ELPH-MCNC: 2.9 GM/DL
GLUCOSE SERPL-MCNC: 82 MG/DL (ref 65–99)
HCT VFR BLD AUTO: 47.6 % (ref 37.5–51)
HDLC SERPL-MCNC: 46 MG/DL (ref 40–60)
HGB BLD-MCNC: 16.2 G/DL (ref 13–17.7)
IMM GRANULOCYTES # BLD AUTO: 0.05 10*3/MM3 (ref 0–0.05)
IMM GRANULOCYTES NFR BLD AUTO: 0.7 % (ref 0–0.5)
INR PPP: 1.12 (ref 0.9–1.1)
LDLC SERPL CALC-MCNC: 44 MG/DL (ref 0–100)
LDLC/HDLC SERPL: 0.87 {RATIO}
LYMPHOCYTES # BLD AUTO: 2.05 10*3/MM3 (ref 0.7–3.1)
LYMPHOCYTES NFR BLD AUTO: 29.6 % (ref 19.6–45.3)
MCH RBC QN AUTO: 31.9 PG (ref 26.6–33)
MCHC RBC AUTO-ENTMCNC: 34 G/DL (ref 31.5–35.7)
MCV RBC AUTO: 93.7 FL (ref 79–97)
MONOCYTES # BLD AUTO: 0.76 10*3/MM3 (ref 0.1–0.9)
MONOCYTES NFR BLD AUTO: 11 % (ref 5–12)
NEUTROPHILS NFR BLD AUTO: 3.66 10*3/MM3 (ref 1.7–7)
NEUTROPHILS NFR BLD AUTO: 52.9 % (ref 42.7–76)
NRBC BLD AUTO-RTO: 0 /100 WBC (ref 0–0.2)
PLATELET # BLD AUTO: 269 10*3/MM3 (ref 140–450)
PMV BLD AUTO: 9.7 FL (ref 6–12)
POTASSIUM SERPL-SCNC: 3.8 MMOL/L (ref 3.5–5.2)
PROT SERPL-MCNC: 7.3 G/DL (ref 6–8.5)
PROTHROMBIN TIME: 14.5 SECONDS (ref 11.7–14.2)
RBC # BLD AUTO: 5.08 10*6/MM3 (ref 4.14–5.8)
SODIUM SERPL-SCNC: 139 MMOL/L (ref 136–145)
TRIGL SERPL-MCNC: 139 MG/DL (ref 0–150)
TSH SERPL DL<=0.05 MIU/L-ACNC: 3.26 UIU/ML (ref 0.27–4.2)
VLDLC SERPL-MCNC: 24 MG/DL (ref 5–40)
WBC NRBC COR # BLD AUTO: 6.92 10*3/MM3 (ref 3.4–10.8)

## 2024-12-13 PROCEDURE — 84443 ASSAY THYROID STIM HORMONE: CPT | Performed by: FAMILY MEDICINE

## 2024-12-13 PROCEDURE — 85025 COMPLETE CBC W/AUTO DIFF WBC: CPT | Performed by: FAMILY MEDICINE

## 2024-12-13 PROCEDURE — 36415 COLL VENOUS BLD VENIPUNCTURE: CPT | Performed by: FAMILY MEDICINE

## 2024-12-13 PROCEDURE — 80061 LIPID PANEL: CPT | Performed by: FAMILY MEDICINE

## 2024-12-13 PROCEDURE — 85730 THROMBOPLASTIN TIME PARTIAL: CPT | Performed by: FAMILY MEDICINE

## 2024-12-13 PROCEDURE — 85610 PROTHROMBIN TIME: CPT | Performed by: FAMILY MEDICINE

## 2024-12-13 PROCEDURE — 80053 COMPREHEN METABOLIC PANEL: CPT | Performed by: FAMILY MEDICINE

## 2024-12-13 RX ORDER — GINSENG 100 MG
50 CAPSULE ORAL
COMMUNITY

## 2024-12-13 RX ORDER — CETIRIZINE HYDROCHLORIDE 5 MG/1
5 TABLET ORAL DAILY
COMMUNITY

## 2024-12-13 NOTE — PROGRESS NOTES
Subjective   The ABCs of the Annual Wellness Visit  Medicare Wellness Visit    Chief Complaint   Patient presents with    Medicare Wellness-subsequent       Kingston Mancini is a 74 y.o. patient who presents for a Medicare Wellness Visit.  He lives with his wife, he is fully independent with his activities of daily living.  No issues with depression or memory problems are being reported.  Patient took a fall in 7/2024 and he ended up with a fractured nose.  He is scheduled for the nose surgery in early January 2025.  Patient also recently had a left hand trigger finger repair.  He is followed by several specialists including cardiology.  He was diagnosed with atrial fibrillation earlier this year, he was previously on amiodarone, but that was discontinued about 6 weeks ago.  He overall reports doing well and denies any new issues or concerns. Patient does not report any chest pain, shortness of breath, dizziness, nausea, vomiting, or diarrhea, visual issues, headaches, numbness or tingling. No urinary issues reported like urgency, frequency, or discomfort upon urination.  No significant weight changes reported.  No swelling reported.  No rashes or any other skin issues reported. No emotional issues or insomnia.    The following portions of the patient's history were reviewed and   updated as appropriate: allergies, current medications, past family history, past medical history, past social history, past surgical history, and problem list.    Compared to one year ago, the patient's physical   health is the same.  Compared to one year ago, the patient's mental   health is the same.    Recent Hospitalizations:  He was not admitted to the hospital during the last year.     Current Medical Providers:  Patient Care Team:  Steff Singletary MD as PCP - General (Family Medicine)  Seipel, Joseph F, MD as Consulting Physician (Sleep Medicine)  Jeancarlos Ortega Jr., MD as Consulting Physician (Dermatology)  Edny Mukherjee,  MD as Consulting Physician (Otolaryngology)  Severiano Hinton MD as Surgeon (Hand Surgery)  Kingston Maciel MD as Consulting Physician (Cardiology)  Regan Franklin MD as Consulting Physician (Ophthalmology)    Outpatient Medications Prior to Visit   Medication Sig Dispense Refill    amLODIPine (NORVASC) 2.5 MG tablet Take 1 tablet by mouth Daily. (Patient taking differently: Take 1 tablet by mouth Every Night.) 90 tablet 3    apixaban (ELIQUIS) 5 MG tablet tablet Take 1 tablet by mouth 2 (Two) Times a Day. 180 tablet 2    Aspirin Low Dose 81 MG EC tablet TAKE 1 TABLET BY MOUTH EVERY DAY 90 tablet 2    calcium carbonate (OS-LAURYN) 600 MG tablet Take 1 tablet by mouth Daily.      cetirizine (zyrTEC) 5 MG tablet Take 1 tablet by mouth Daily.      Cholecalciferol (VITAMIN D3) 2000 units tablet VITAMIN D3 2000 UNIT TABS      isosorbide mononitrate (IMDUR) 30 MG 24 hr tablet Take 1 tablet by mouth Daily. 90 tablet 3    Multiple Vitamins-Minerals (ICAPS AREDS 2 PO)       omeprazole (priLOSEC) 20 MG capsule TAKE 1 CAPSULE BY MOUTH EVERY DAY 90 capsule 0    Probiotic Product (PROBIOTIC-10 PO) Take 1 tablet by mouth Daily.      rosuvastatin (CRESTOR) 10 MG tablet TAKE 1 TABLET BY MOUTH EVERY DAY 90 tablet 1    Zinc 50 MG tablet Take 1 tablet by mouth.      loratadine (CLARITIN) 10 MG tablet Take 1 tablet by mouth Daily.       No facility-administered medications prior to visit.     No opioid medication identified on active medication list. I have reviewed chart for other potential  high risk medication/s and harmful drug interactions in the elderly.      Aspirin is on active medication list. Aspirin use is indicated based on review of current medical condition/s. Pros and cons of this therapy have been discussed today. Benefits of this medication outweigh potential harm.  Patient has been encouraged to continue taking this medication.  .      Patient Active Problem List   Diagnosis    DDD (degenerative disc  "disease), cervical    Derangement of knee    Medicare annual wellness visit, subsequent    Erectile dysfunction    Hay fever    Hyperlipidemia    Hypertension    Neck pain, acute    AURELIA on CPAP    Osteoarthritis    Vitamin D deficiency    Vitamin B12 deficiency    Secondary polycythemia    Personal history of prostate cancer    Elbow pain, right    Macular degeneration    Chronic pain of left knee    Injury of great toe of left foot    Angina pectoris    Coronary artery disease involving native coronary artery of native heart without angina pectoris    Gastroesophageal reflux disease without esophagitis    Left wrist pain    PAF (paroxysmal atrial fibrillation)    s/p PVI with PFA 10/2024    Preop testing     Advance Care Planning Advance Directive is on file.  ACP discussion was held with the patient during this visit. Patient has an advance directive in EMR which is still valid.       Review of Systems   Constitutional:  Negative for activity change, fatigue and fever.   Respiratory:  Negative for cough, shortness of breath and wheezing.    Cardiovascular:  Negative for chest pain, palpitations and leg swelling.   Gastrointestinal:  Negative for constipation, diarrhea and indigestion.   Skin:  Negative for color change, dry skin and rash.   Neurological:  Negative for tremors and headache.           Objective   Vitals:    12/13/24 0811 12/13/24 1903   BP: 132/81    BP Location: Left arm    Patient Position: Sitting    Cuff Size: Adult    Pulse: 62    Resp: 16    Temp: 98.2 °F (36.8 °C)    TempSrc: Oral    SpO2: 96%    Weight: 93.3 kg (205 lb 9.6 oz)    Height: 180.3 cm (70.98\")    PainSc: 10-Worst pain ever   4   PainLoc: Back        Estimated body mass index is 28.69 kg/m² as calculated from the following:    Height as of this encounter: 180.3 cm (70.98\").    Weight as of this encounter: 93.3 kg (205 lb 9.6 oz).     Physical Exam  Vitals and nursing note reviewed.   Constitutional:       General: He is not in " acute distress.     Appearance: Normal appearance. He is well-developed. He is not ill-appearing.   HENT:      Head: Normocephalic and atraumatic.   Cardiovascular:      Rate and Rhythm: Normal rate and regular rhythm.      Heart sounds: Normal heart sounds. No murmur heard.     No gallop.   Pulmonary:      Effort: Pulmonary effort is normal. No respiratory distress.      Breath sounds: Normal breath sounds. No wheezing, rhonchi or rales.   Chest:      Chest wall: No tenderness.   Musculoskeletal:      Cervical back: Normal range of motion and neck supple.   Neurological:      General: No focal deficit present.      Mental Status: He is alert and oriented to person, place, and time. Mental status is at baseline.   Psychiatric:         Mood and Affect: Mood normal.              Does the patient have evidence of cognitive impairment? No                                                                                               Health  Risk Assessment    Smoking Status:  Social History     Tobacco Use   Smoking Status Never    Passive exposure: Never   Smokeless Tobacco Never     Alcohol Consumption:  Social History     Substance and Sexual Activity   Alcohol Use Yes    Alcohol/week: 6.0 standard drinks of alcohol    Types: 4 Glasses of wine, 2 Cans of beer per week    Comment: 4-5 beer or glass wine weekly       Fall Risk Screen  STEADI Fall Risk Assessment was completed, and patient is at HIGH risk for falls. Assessment completed on:2024    Depression Screening   Little interest or pleasure in doing things? Not at all   Feeling down, depressed, or hopeless? Not at all   PHQ-2 Total Score 0      Health Habits and Functional and Cognitive Screenin/13/2024     8:16 AM   Functional & Cognitive Status   Do you have difficulty preparing food and eating? No   Do you have difficulty bathing yourself, getting dressed or grooming yourself? No   Do you have difficulty using the toilet? No   Do you have  difficulty moving around from place to place? No   Do you have trouble with steps or getting out of a bed or a chair? No   Current Diet Well Balanced Diet   Dental Exam Up to date   Eye Exam Up to date   Exercise (times per week) 4 times per week   Current Exercises Include Walking;Yard Work   Do you need help using the phone?  No   Are you deaf or do you have serious difficulty hearing?  No   Do you need help to go to places out of walking distance? No   Do you need help shopping? No   Do you need help preparing meals?  No   Do you need help with housework?  No   Do you need help with laundry? No   Do you need help taking your medications? No   Do you need help managing money? No   Do you ever drive or ride in a car without wearing a seat belt? No   Have you felt unusual stress, anger or loneliness in the last month? No   Who do you live with? Spouse   If you need help, do you have trouble finding someone available to you? No   Have you been bothered in the last four weeks by sexual problems? No   Do you have difficulty concentrating, remembering or making decisions? No           Age-appropriate Screening Schedule:  Refer to the list below for future screening recommendations based on patient's age, sex and/or medical conditions. Orders for these recommended tests are listed in the plan section. The patient has been provided with a written plan.    Health Maintenance List  Health Maintenance   Topic Date Due    INFLUENZA VACCINE  07/01/2024    COVID-19 Vaccine (8 - 2024-25 season) 09/01/2024    LIPID PANEL  12/11/2024    BMI FOLLOWUP  05/24/2025    ANNUAL WELLNESS VISIT  12/13/2025    TDAP/TD VACCINES (3 - Tdap) 10/25/2026    COLORECTAL CANCER SCREENING  04/20/2033    HEPATITIS C SCREENING  Completed    Pneumococcal Vaccine 65+  Completed    ZOSTER VACCINE  Completed                                                                                                                                                CMS  Preventative Services Quick Reference  Risk Factors Identified During Encounter  Fall Risk-High or Moderate: Discussed Fall Prevention in the home  Immunizations Discussed/Encouraged: COVID19 and RSV (Respiratory Syncytial Virus)  Dental Screening Recommended  Vision Screening Recommended    The above risks/problems have been discussed with the patient.  Pertinent information has been shared with the patient in the After Visit Summary.  An After Visit Summary and PPPS were made available to the patient.    Follow Up:   Next Medicare Wellness visit to be scheduled in 1 year.     Assessment & Plan  Medicare annual wellness visit, subsequent    Mixed hyperlipidemia       Orders:    TSH    Primary hypertension      Orders:    CBC Auto Differential    Comprehensive Metabolic Panel    Lipid Panel    Preop testing    Orders:    APTT; Future    Protime-INR       Medicare wellness exam was done today.  I will be getting fasting blood work.  I also reviewed his health maintenance.  His last colonoscopy was in 4/2023 and 5-year follow-up colonoscopy was recommended.  Immunization was also reviewed and recommended.  Healthy lifestyle was reinforced.  Fall prevention was discussed and stressed.      Follow Up:     Return in about 6 months (around 6/13/2025) for Next scheduled follow up.    Requested Prescriptions      No prescriptions requested or ordered in this encounter     Steff Singletary MD  12/13/2024

## 2024-12-13 NOTE — ASSESSMENT & PLAN NOTE
Orders:    CBC Auto Differential    Comprehensive Metabolic Panel    Lipid Panel     Anesthesia Post Evaluation    Patient: Rex Hassan    Procedure(s) Performed: Procedure(s) (LRB):  ARTHROPLASTY, KNEE, TOTAL, USING COMPUTER-ASSISTED NAVIGATION (Right)    Final Anesthesia Type: general      Patient location during evaluation: PACU  Post-procedure vital signs: reviewed and stable  Pain management: adequate  Airway patency: patent  CHRISTY mitigation strategies: Multimodal analgesia  PONV status at discharge: No PONV  Anesthetic complications: no      Cardiovascular status: hemodynamically stable  Respiratory status: unassisted  Hydration status: euvolemic  Follow-up not needed.          Vitals Value Taken Time   /73 07/13/22 0400   Temp 36.4 °C (97.5 °F) 07/13/22 0400   Pulse 60 07/13/22 0400   Resp 16 07/13/22 0639   SpO2 97 % 07/13/22 0400         Event Time   Out of Recovery 11:25:00         Pain/Marissa Score: Pain Rating Prior to Med Admin: 5 (7/13/2022  6:39 AM)  Pain Rating Post Med Admin: 2 (7/12/2022  9:53 PM)  Marissa Score: 10 (7/12/2022 11:20 AM)

## 2025-01-03 ENCOUNTER — OFFICE VISIT (OUTPATIENT)
Age: 75
End: 2025-01-03
Payer: MEDICARE

## 2025-01-03 VITALS
HEART RATE: 65 BPM | WEIGHT: 204 LBS | DIASTOLIC BLOOD PRESSURE: 68 MMHG | HEIGHT: 71 IN | SYSTOLIC BLOOD PRESSURE: 114 MMHG | BODY MASS INDEX: 28.56 KG/M2

## 2025-01-03 DIAGNOSIS — Z98.890 HISTORY OF CARDIAC RADIOFREQUENCY ABLATION: ICD-10-CM

## 2025-01-03 DIAGNOSIS — I48.0 PAF (PAROXYSMAL ATRIAL FIBRILLATION): Primary | ICD-10-CM

## 2025-01-03 DIAGNOSIS — I25.10 CORONARY ARTERY DISEASE INVOLVING NATIVE CORONARY ARTERY OF NATIVE HEART WITHOUT ANGINA PECTORIS: ICD-10-CM

## 2025-01-03 DIAGNOSIS — E78.2 MIXED HYPERLIPIDEMIA: ICD-10-CM

## 2025-01-03 DIAGNOSIS — I10 PRIMARY HYPERTENSION: ICD-10-CM

## 2025-01-03 PROCEDURE — 93000 ELECTROCARDIOGRAM COMPLETE: CPT | Performed by: PHYSICIAN ASSISTANT

## 2025-01-03 PROCEDURE — 3074F SYST BP LT 130 MM HG: CPT | Performed by: PHYSICIAN ASSISTANT

## 2025-01-03 PROCEDURE — 1159F MED LIST DOCD IN RCRD: CPT | Performed by: PHYSICIAN ASSISTANT

## 2025-01-03 PROCEDURE — 99214 OFFICE O/P EST MOD 30 MIN: CPT | Performed by: PHYSICIAN ASSISTANT

## 2025-01-03 PROCEDURE — 3078F DIAST BP <80 MM HG: CPT | Performed by: PHYSICIAN ASSISTANT

## 2025-01-03 PROCEDURE — 1160F RVW MEDS BY RX/DR IN RCRD: CPT | Performed by: PHYSICIAN ASSISTANT

## 2025-01-03 NOTE — PROGRESS NOTES
"      ELECTROPHYSIOLOGY   Date of Office Visit: 2025  Patient Name: Kingston Mancini  : 1950  Encounter Provider: Maldonado Galdamez PA-C  Primary Cardiologist: Dr. Bertrand  Electrophysiologist: Dr. Maciel  CHIEF COMPLAINT / REASON FOR OFFICE VISIT     paroxysmal AFIB  2 month follow up     HISTORY OF PRESENT ILLNESS     This is a 74 y.o. year old male who presents to [facility] CARD[facility]OLOG[facility] for a for a 3 month follow up.     He has a history of CAD with prior heart cath 2024 that showed a 80-90% stenosis of the LAD. PCI was attempted but balloon could not cross lesion. He has been on medical therapy since.     He was diagnosed with new onset atrial fibrillation in 2024 with symptoms of fatigue and noting an irregular heartbeat on monitoring.    He was started on amiodarone and had a ELENITA/CV by Dr. Bertrand and he referred himself to our office for evaluation. An ablation was recommended.      S/p PVI with PFA 10/8/2024    He is here today for 3-month follow-up post ablation.    Today the patient reports he has been doing well.  She will is to have any new complaints today and has not felt any episodes of atrial fibrillation at all.  He takes serial EKGs on his watch and they have all been sinus.    He denies any episodes of chest pain, dizziness or lightheadedness.  He denies any worsening shortness of breath or symptoms of orthopnea.    On apixaban 5 mg twice daily.  Denies bleeding complications.    He is getting a maze surgery completed the next couple hours and later in April he is going to have a proptosis surgery on both eyes.    PMHx:  CAD, persistent AF s/p PFA ablation, HTN, GERD, AURELIA     PHYSICAL EXAMINATION     Vital Signs:  /68 (BP Location: Right arm, Patient Position: Sitting)   Pulse 65   Ht 180.3 cm (70.98\")   Wt 92.5 kg (204 lb)   BMI 28.47 kg/m²   Estimated body mass index is 28.47 kg/m² as calculated from the following:    Height as of this encounter: 180.3 " "cm (70.98\").    Weight as of this encounter: 92.5 kg (204 lb).               Physical Exam  Constitutional:       Appearance: Normal appearance.   HENT:      Head: Normocephalic and atraumatic.   Cardiovascular:      Rate and Rhythm: Normal rate and regular rhythm.      Pulses: Normal pulses.      Heart sounds: Normal heart sounds.   Pulmonary:      Effort: Pulmonary effort is normal.      Breath sounds: Normal breath sounds.   Musculoskeletal:      Right lower leg: No edema.      Left lower leg: No edema.   Skin:     General: Skin is warm and dry.   Neurological:      General: No focal deficit present.      Mental Status: He is alert and oriented to person, place, and time.          Cardiac Testing/Results     Cardiac Testing:   - Echo 5/2024: EF 61-65% with normal diastolic function. Mild calcification of AV. Mild dilation of ascending aorta at 3.7 cm.     Result Review :  The following data was reviewed by: Maldonado aGldamez PA-C on 01/03/2025:    Lipid Panel          12/13/2024    08:46   Lipid Panel   Total Cholesterol 114    Triglycerides 139    HDL Cholesterol 46    VLDL Cholesterol 24    LDL Cholesterol  44    LDL/HDL Ratio 0.87       Lab Results   Component Value Date     12/13/2024     09/23/2024    K 3.8 12/13/2024    K 4.6 09/23/2024     12/13/2024     09/23/2024    CO2 27.5 12/13/2024    CO2 24.7 09/23/2024    BUN 26 (H) 12/13/2024    BUN 24 (H) 09/23/2024    CREATININE 1.21 12/13/2024    CREATININE 1.24 09/23/2024    EGFRIFNONA 67 10/26/2021    EGFRIFNONA 64 10/06/2020    EGFRIFAFRI 78 10/26/2016    GLUCOSE 82 12/13/2024    GLUCOSE 89 09/23/2024    CALCIUM 9.9 12/13/2024    CALCIUM 10.0 09/23/2024    ALBUMIN 4.4 12/13/2024    ALBUMIN 4.8 12/11/2023    AST 25 12/13/2024    AST 22 12/11/2023    ALT 27 12/13/2024    ALT 22 12/11/2023     Lab Results   Component Value Date    WBC 6.92 12/13/2024    WBC 6.49 09/23/2024    HGB 16.2 12/13/2024    HGB 17.9 (H) 09/23/2024    HCT 47.6 " "12/13/2024    HCT 54.6 (H) 09/23/2024    MCV 93.7 12/13/2024    MCV 94.1 09/23/2024     12/13/2024     09/23/2024     Lab Results   Component Value Date    PROBNP 120.0 05/01/2024     No results found for: \"CKTOTAL\", \"CKMB\", \"CKMBINDEX\", \"TROPONINI\", \"TROPONINT\"  Lab Results   Component Value Date    TSH 3.260 12/13/2024    TSH 1.550 09/06/2024                 ECG 12 Lead    Date/Time: 1/3/2025 9:33 AM  Performed by: Maldonado Novak PA-C    Authorized by: Maldonado Novak PA-C  Comparison: compared with previous ECG from 11/1/2024  Rhythm: sinus rhythm  Rate: normal  BPM: 65  ST Segments: ST segments normal  T Waves: T waves normal  QRS axis: normal  Comments: Qtc 425                ASSESSMENT & PLAN       Diagnoses and all orders for this visit:    1-2. PAF (paroxysmal atrial fibrillation) (Primary) s/p PVI with PFA 10/2024  In sinus rhythm today on EKG.  He has had no recurrence of atrial fibrillation since his ablation  Overall he is doing really well and we will continue him on apixaban 5 mg twice daily.  [id number] score  (2- age/ HTN)-would recommend continuing anticoagulation 6 months post ablation but if he has had no episodes of atrial fibrillation since it would be reasonable to consider stopping  He will contact the office if any new symptoms  3. Coronary artery disease involving native coronary artery of native heart without angina pectoris  EKG today without any T wave changes.  He is asymptomatic with no anginal symptoms  Continue medical therapy in the form of isosorbide 30 mg daily, aspirin and rosuvastatin  He would like to switch his cardiovascular care to our office going forward and I will get him set up to see Dr. Fitzpatrick is a new patient in 6 months  4. Primary hypertension  Blood pressures been really well-controlled.  I recommended monitoring this intermittently at home and to call the office if any abnormal readings  5. Mixed hyperlipidemia  Goal LDL is less than " 55 given history of coronary disease that is chronic-most recent blood work was reviewed and it is at baseline.  Continue Crestor.    If he has any recurrence of arrhythmia in the future we will be happy to see him back from the EP standpoint but at this point we are transitioning him to regular cardiovascular care.      Follow Up:  Return for 6 months with Dr. Fitzpatrick to establish care .  Patient was given instructions and counseling regarding his condition or for health maintenance advice. Please contact office if worsening symptoms or proceed to ER when appropriate.      Maldonado Galdamez PA-C  01/03/25  09:40 EST    MEDICATIONS         Discharge Medications            Accurate as of January 3, 2025  9:40 AM. If you have any questions, ask your nurse or doctor.                Changes to Medications        Instructions Start Date   amLODIPine 2.5 MG tablet  Commonly known as: NORVASC  What changed: when to take this   2.5 mg, Oral, Daily             Continue These Medications        Instructions Start Date   apixaban 5 MG tablet tablet  Commonly known as: ELIQUIS   5 mg, Oral, 2 Times Daily      Aspirin Low Dose 81 MG EC tablet  Generic drug: aspirin   81 mg, Oral, Daily      calcium carbonate 600 MG tablet  Commonly known as: OS-LAURYN   600 mg, Daily      cetirizine 5 MG tablet  Commonly known as: zyrTEC   5 mg, Daily      ICAPS AREDS 2 PO   No dose, route, or frequency recorded.      isosorbide mononitrate 30 MG 24 hr tablet  Commonly known as: IMDUR   30 mg, Oral, Daily      omeprazole 20 MG capsule  Commonly known as: priLOSEC   20 mg, Oral, Daily      PROBIOTIC-10 PO   1 tablet, Daily      rosuvastatin 10 MG tablet  Commonly known as: CRESTOR   10 mg, Oral, Daily      Vitamin D3 50 MCG (2000 UT) tablet   VITAMIN D3 2000 UNIT TABS      Zinc 50 MG tablet   50 mg                   **Dragon Disclaimer: This note was dictated using an electronic transcription. The electronic translation of spoken language may permit  erroneous, or at times, nonsensical words or phrases to be inadvertently transcribed. Although I have reviewed the note for such errors, some may still exist.

## 2025-01-06 DIAGNOSIS — K21.9 GASTRO-ESOPHAGEAL REFLUX DISEASE WITHOUT ESOPHAGITIS: ICD-10-CM

## 2025-01-15 ENCOUNTER — TELEPHONE (OUTPATIENT)
Age: 75
End: 2025-01-15
Payer: MEDICARE

## 2025-01-15 NOTE — TELEPHONE ENCOUNTER
Received a fax stating pt is scheduled for a Bilateral Upper Lid ptosis repair 4- with Dr Franklin. They are asking if pt can move forward and hold his apixaban 7 days prior to. Pt has NO history of stroke or valve replacement...Amarilis

## 2025-02-07 RX ORDER — ISOSORBIDE MONONITRATE 30 MG/1
30 TABLET, EXTENDED RELEASE ORAL DAILY
Qty: 90 TABLET | Refills: 1 | Status: SHIPPED | OUTPATIENT
Start: 2025-02-07

## 2025-04-05 DIAGNOSIS — K21.9 GASTRO-ESOPHAGEAL REFLUX DISEASE WITHOUT ESOPHAGITIS: ICD-10-CM

## 2025-04-05 RX ORDER — OMEPRAZOLE 20 MG/1
20 CAPSULE, DELAYED RELEASE ORAL DAILY
Qty: 90 CAPSULE | Refills: 0 | Status: SHIPPED | OUTPATIENT
Start: 2025-04-05

## 2025-04-25 NOTE — PROGRESS NOTES
Chief Complaint  Sleep Apnea    Subjective          Kingston Mancini presents to [facility] NEUROLOGY  History of Present Illness  Kingston Mancini is a 74-year-old male seen today in follow-up for yearly PAP compliance for moderate to severe AURELIA.  Patient underwent sleep study in 2017 which showed an overall AHI of 29.4 and a minimum SpO2 of 83%.  Patient states that he is doing well with his current device.  He currently uses a nasal pillow and gets resupply through [employer].  The patient fell last June and [facility]  Patient states that he was off PAP therapy during that time but has returned to using his device every day.  When he travels he uses his second PAP device.    No new complaints today.        SLEEP TESTING HISTORY:     On NPSG at [facility] , 12/26/2017 patient had Moderate obstructive sleep apnea syndrome with apnea-hypopnea index of 29.4 per sleep hour, minimum SpO2 of 83%    PAP download (care  3/12/2025 - 4/10/2025):  The patient is on auto CPAP therapy at 6-10 cm/H2O.   Data indicates Excellent compliance. With 100% usage for more than 4 hours with an average usage of 6 hours 55 minutes. AHI down to 2.6 .  Average pressures 10cm H2O.  Average unintended leak 5.8 LPM.     The patient's hypersomnia has resolved       [place] Sleepiness Scale:  Sitting and reading JS [place] Sleepiness: 1 WatchingTV JS [place] Sleepiness: 2  Sitting, inactive, in a public place JS [place] Sleepiness: 0  As a passenger in a car for 1 hour w/o a break  JS [place] Sleepiness: 0  Lying down to rest in the afternoon JS [place] Sleepiness: 2  Sitting and talking to someone  JS [place] Sleepiness: 0  Sitting quietly after a lunch  JS [place] Sleepiness: 0  In a car, while stopped for traffic or a light  JS [place] Sleepiness: 0  Total 5    Review of Systems   Constitutional:  Positive for activity change and fatigue.   HENT:  Positive for  "tinnitus.    Eyes:  Positive for redness.   Respiratory:  Positive for apnea.    Musculoskeletal:  Positive for neck pain.   All other systems reviewed and are negative.     Objective   Vital Signs:   /89   Pulse 70   Resp 16   Ht 180.3 cm (70.98\")   Wt 94.3 kg (208 lb)   BMI 29.03 kg/m²     Physical Exam  Vitals reviewed.   Constitutional:       Appearance: He is well-developed.   HENT:      Head: Normocephalic and atraumatic.      Right Ear: Decreased hearing noted.      Left Ear: Decreased hearing noted.      Ears:      Comments: Throat clearing throughout visit      Mouth/Throat:      Comments: MMP3   Eyes:      Extraocular Movements: Extraocular movements intact.      Pupils: Pupils are equal, round, and reactive to light.   Neck:      Vascular: No carotid bruit.   Cardiovascular:      Rate and Rhythm: Normal rate.      Heart sounds: No murmur heard.  Pulmonary:      Effort: Pulmonary effort is normal.   Musculoskeletal:      Cervical back: Normal range of motion and neck supple.   Skin:     General: Skin is warm and dry.   Neurological:      Mental Status: He is alert and oriented to person, place, and time.      Cranial Nerves: Cranial nerves 2-12 are intact. No cranial nerve deficit.      Sensory: No sensory deficit.      Motor: Motor function is intact. No tremor.      Coordination: Coordination is intact.      Gait: Gait is intact. Gait normal.   Psychiatric:         Attention and Perception: Attention normal.         Mood and Affect: Mood normal.         Speech: Speech normal.         Behavior: Behavior normal.         Cognition and Memory: Cognition and memory normal.         Judgment: Judgment normal.        Result Review :   The following data was reviewed by: PATRICIA Beavers on 04/29/2025:  CMP          9/6/2024    13:03 9/23/2024    07:56 12/13/2024    08:46   CMP   Glucose 147  89  82    BUN 30  24  26    Creatinine 1.19  1.24  1.21    EGFR 64.9  61.8  63.2    Sodium 140  140  " 139    Potassium 4.0  4.6  3.8    Chloride 104  103  102    Calcium 9.7  10.0  9.9    Total Protein   7.3    Albumin   4.4    Globulin   2.9    Total Bilirubin   0.7    Alkaline Phosphatase   78    AST (SGOT)   25    ALT (SGPT)   27    Albumin/Globulin Ratio   1.5    BUN/Creatinine Ratio 25.2  19.4  21.5    Anion Gap 13.0  12.3  9.5      CBC          7/20/2024    07:37 9/23/2024    07:56 12/13/2024    08:46   CBC   WBC 6.31  6.49  6.92    RBC 5.37  5.80  5.08    Hemoglobin 16.1  17.9  16.2    Hematocrit 48.8  54.6  47.6    MCV 90.9  94.1  93.7    MCH 30.0  30.9  31.9    MCHC 33.0  32.8  34.0    RDW 12.5  12.8  12.0    Platelets 251  280  269      Lipid Panel          12/13/2024    08:46   Lipid Panel   Total Cholesterol 114    Triglycerides 139    HDL Cholesterol 46    VLDL Cholesterol 24    LDL Cholesterol  44    LDL/HDL Ratio 0.87      TSH          9/6/2024    13:03 12/13/2024    08:46   TSH   TSH 1.550  3.260                  Assessment and Plan    Diagnoses and all orders for this visit:    1. AURELIA (obstructive sleep apnea) (Primary)  -     PAP Therapy        Kingston Mancini is seen today for yearly CPAP compliance for moderate to severe AURELIA.  The patient was diagnosed with severe AURELIA from a sleep study done in 2017 which showed an overall AHI of 29.5.  He is currently using a DreamStation 2 auto CPAP advanced.  The patient is compliant with PAP therapy 100% over 4 hours and it is effective in treating his sleep apnea with an overall AHI of 2.6.  He uses a different PAP device when he travels.    He will continue to wear PAP therapy over 4 hours every night.  Will order resupply through [employer].    Patient is high risk due to A-fib.    Follow-up 1 year    The patient is compliant with and benefiting from PAP therapy.      Follow Up   Return in about 1 year (around 4/29/2026).    Patient was given instructions and counseling regarding his condition or for health maintenance advice. Please see specific  information pulled into the AVS if appropriate.       This document has been electronically signed by PATRICIA Lo on April 29, 2025 09:25 EDT

## 2025-04-28 ENCOUNTER — OFFICE VISIT (OUTPATIENT)
Dept: FAMILY MEDICINE CLINIC | Facility: CLINIC | Age: 75
End: 2025-04-28
Payer: MEDICARE

## 2025-04-28 VITALS
HEART RATE: 67 BPM | WEIGHT: 210.4 LBS | RESPIRATION RATE: 15 BRPM | HEIGHT: 71 IN | DIASTOLIC BLOOD PRESSURE: 88 MMHG | TEMPERATURE: 98.3 F | BODY MASS INDEX: 29.46 KG/M2 | OXYGEN SATURATION: 95 % | SYSTOLIC BLOOD PRESSURE: 141 MMHG

## 2025-04-28 DIAGNOSIS — M17.0 PRIMARY OSTEOARTHRITIS OF BOTH KNEES: ICD-10-CM

## 2025-04-28 DIAGNOSIS — B30.9 ACUTE VIRAL CONJUNCTIVITIS OF LEFT EYE: Primary | ICD-10-CM

## 2025-04-28 RX ORDER — TOBRAMYCIN 3 MG/ML
1 SOLUTION/ DROPS OPHTHALMIC
Qty: 5 ML | Refills: 0 | Status: SHIPPED | OUTPATIENT
Start: 2025-04-28

## 2025-04-28 RX ORDER — AMLODIPINE BESYLATE 2.5 MG/1
2.5 TABLET ORAL DAILY
Qty: 90 TABLET | Refills: 1 | Status: SHIPPED | OUTPATIENT
Start: 2025-04-28

## 2025-04-28 NOTE — PROGRESS NOTES
"Subjective   Chief Complaint   Patient presents with    Eye Problem     X3-5 days Swelling under left eye. Some pain.    Arthritis     Discuss options to help arthritis.     Kingston Mancini is a 74 y.o. male.     Patient Care Team:  Steff Singletary MD as PCP - General (Family Medicine)  Seipel, Joseph F, MD as Consulting Physician (Sleep Medicine)  Jeancarlos Ortega Jr., MD as Consulting Physician (Dermatology)  Endy Mukherjee MD as Consulting Physician (Otolaryngology)  Severiano Hinton MD as Surgeon (Hand Surgery)  Kingston Maciel MD as Consulting Physician (Cardiology)  Regan Franklin MD as Consulting Physician (Ophthalmology)  Yonatan Hinton MD as Consulting Physician (Gastroenterology)    History of Present Illness  He is coming in today with some concerns with the left eye.  He reports that for the last 3 to 5 days he has noted some puffy feeling/swelling beneath the left eye and seems to be somehow on the right side.  He denies any visual problems.  He denies any pain in the eye.  He is scheduled for the bilateral eye ptosis surgery on 5/8/2025 and he would like that to be addressed and fixed by then.  While in the office today he also wants to talk about his issues with arthritis which primarily affects his knees.  He cannot really take anti-inflammatories due to being on Eliquis.  He wonders if there are some other options which he can try.       The following portions of the patient's history were reviewed and updated as appropriate: allergies, current medications, past family history, past medical history, past social history, past surgical history, and problem list.  Past Medical History:   Diagnosis Date    Abnormal ECG 04/2024    Fatigue, chest \"ache\" (vs pain), lack of energy    Allergic 1980    Penicillin    Cataract Years ago    Cataracts removed from both eyes May, 2022    Colon polyp     Colonoscopy in April 2023    Diverticulosis 2000?    Use diet to control. Antibiotics " during flare ups    Erectile dysfunction     GERD (gastroesophageal reflux disease)     History of basal cell cancer     [facility]raction from [facility] Past Medical Hx states Basal Cell on ear    History of cardiac cath     [facility]raction From [facility] Heart Cath    History of degenerative disc disease     DDD    History of foreign travel     [place] ; [place] Oct/Nov    HL (hearing loss) 1996    Permanent tinnitus from whiplash injury    Hyperlipidemia     Hypertension 2024    BP fluctuated but overall was not high. But since late May, it has been higher but never extreme.    AURELIA on CPAP 2017    AURELIA npsg Dec 2017 ahi 29 auto cpap 7-14 nasal mask    Osteoarthritis     PAF (paroxysmal atrial fibrillation) 2024    Prostate cancer     Hussein score 6, followed by Dr. Infante, age 59     Past Surgical History:   Procedure Laterality Date    ABLATION OF DYSRHYTHMIC FOCUS  10/08/24    CARDIAC CATHETERIZATION N/A 2024    Procedure: Left Heart Cath;  Surgeon: Cleve Bertrand MD;  Location:  [facility];  Service: Cardiovascular;  Laterality: N/A;    CARDIAC ELECTROPHYSIOLOGY PROCEDURE N/A 10/08/2024    Procedure: Ablation atrial fibrillation pfa;  Surgeon: Kingston Maciel MD;  Location:  [facility]ATH [facility] LOCATION;  Service: Cardiovascular;  Laterality: N/A;  PFA    CATARACT EXTRACTION W/ INTRAOCULAR LENS IMPLANT Bilateral 2022    COLONOSCOPY      CYST REMOVAL  2020    EYE SURGERY  May, 2022    Cataracts in both eyes    HAND SURGERY Bilateral     , 10/93, 2007, 2013, 2013    HERNIA REPAIR      OTHER SURGICAL HISTORY      Abstraction from [facility]    PROSTATECTOMY      VASECTOMY       The patient has a family history of  Family History   Problem Relation Age of Onset    Breast cancer Mother 67         age 70    Cancer Mother         Breast cancer from hormone treatment    Heart attack Father     Other Father   "       Amyloidosis    Heart disease Father         Had Amyloidosis at age 87.  of heart failure vs. heart attack.    Asthma Sister     Heart attack Maternal Grandmother     Cancer Maternal Grandmother     Kidney cancer Maternal Grandfather     Cancer Paternal Grandmother     Diabetes Paternal Grandfather     Cancer Paternal Grandfather     Kidney disease Paternal Grandfather     Cancer Other         Breast Cancer    Diabetes Other     Heart disease Other     Hypertension Other     Diverticulosis Other     Other Other         Amyloidosis    Heart disease Brother         Valve failure - born with heart issue - resolved at age 6. Now has pacemaker/implantable defibrillator     Social History     Socioeconomic History    Marital status:      Spouse name: Melodie    Number of children: 3    Years of education: College   Tobacco Use    Smoking status: Never     Passive exposure: Never    Smokeless tobacco: Never   Vaping Use    Vaping status: Never Used   Substance and Sexual Activity    Alcohol use: Yes     Alcohol/week: 6.0 standard drinks of alcohol     Types: 4 Glasses of wine, 2 Cans of beer per week     Comment: 4-5 beer or glass wine weekly    Drug use: Never    Sexual activity: Not Currently     Partners: Female     Birth control/protection: Vasectomy, Surgical       Review of Systems   Eyes:  Positive for discharge and redness. Negative for blurred vision, double vision, photophobia, pain, itching and visual disturbance.   Musculoskeletal:  Positive for arthralgias.     Visit Vitals  /88 (BP Location: Right arm, Patient Position: Sitting, Cuff Size: Adult)   Pulse 67   Temp 98.3 °F (36.8 °C) (Infrared)   Resp 15   Ht 180.3 cm (70.98\")   Wt 95.4 kg (210 lb 6.4 oz)   SpO2 95%   BMI 29.36 kg/m²              Current Outpatient Medications:     amLODIPine (NORVASC) 2.5 MG tablet, TAKE 1 TABLET BY MOUTH EVERY DAY, Disp: 90 tablet, Rfl: 1    apixaban (ELIQUIS) 5 MG tablet tablet, Take 1 tablet by mouth 2 " (Two) Times a Day., Disp: 180 tablet, Rfl: 2    Aspirin Low Dose 81 MG EC tablet, TAKE 1 TABLET BY MOUTH EVERY DAY, Disp: 90 tablet, Rfl: 2    calcium carbonate (OS-LAURYN) 600 MG tablet, Take 1 tablet by mouth Daily., Disp: , Rfl:     cetirizine (zyrTEC) 5 MG tablet, Take 1 tablet by mouth Daily., Disp: , Rfl:     Cholecalciferol (VITAMIN D3) 2000 units tablet, VITAMIN D3 2000 UNIT TABS, Disp: , Rfl:     isosorbide mononitrate (IMDUR) 30 MG 24 hr tablet, TAKE 1 TABLET BY MOUTH EVERY DAY, Disp: 90 tablet, Rfl: 1    Multiple Vitamins-Minerals (ICAPS AREDS 2 PO), , Disp: , Rfl:     omeprazole (priLOSEC) 20 MG capsule, TAKE 1 CAPSULE BY MOUTH EVERY DAY, Disp: 90 capsule, Rfl: 0    Probiotic Product (PROBIOTIC-10 PO), Take 1 tablet by mouth Daily., Disp: , Rfl:     rosuvastatin (CRESTOR) 10 MG tablet, TAKE 1 TABLET BY MOUTH EVERY DAY, Disp: 90 tablet, Rfl: 1    Zinc 50 MG tablet, Take 1 tablet by mouth., Disp: , Rfl:     Diclofenac Sodium (VOLTAREN) 1 % gel gel, Apply 4 g topically to the appropriate area as directed 4 (Four) Times a Day As Needed (pain)., Disp: 100 g, Rfl: 0    tobramycin (Tobrex) 0.3 % solution ophthalmic solution, Administer 1 drop into the left eye Every 4 (Four) Hours., Disp: 5 mL, Rfl: 0    Objective   Physical Exam  Constitutional:       General: He is not in acute distress.     Appearance: Normal appearance. He is well-developed. He is not ill-appearing or diaphoretic.      Comments: Patient is in no distress, patient has normal voice and speech.  Normal respiratory effort.   HENT:      Head: Normocephalic and atraumatic.   Eyes:      Extraocular Movements: Extraocular movements intact.      Pupils: Pupils are equal, round, and reactive to light.      Comments: There is some inflammation/redness noted on the left conjunctiva.  There is some mild watery swelling noted beneath the left eye.  No signs of skin infection.  No cellulitis.  No skin rashes.   Pulmonary:      Effort: Pulmonary effort is  normal.   Musculoskeletal:      Cervical back: Normal range of motion and neck supple.   Neurological:      General: No focal deficit present.      Mental Status: He is alert and oriented to person, place, and time. Mental status is at baseline.   Psychiatric:         Mood and Affect: Mood normal.           Assessment & Plan   Diagnoses and all orders for this visit:    1. Acute viral conjunctivitis of left eye (Primary)  -     tobramycin (Tobrex) 0.3 % solution ophthalmic solution; Administer 1 drop into the left eye Every 4 (Four) Hours.  Dispense: 5 mL; Refill: 0    2. Primary osteoarthritis of both knees  -     Diclofenac Sodium (VOLTAREN) 1 % gel gel; Apply 4 g topically to the appropriate area as directed 4 (Four) Times a Day As Needed (pain).  Dispense: 100 g; Refill: 0        I will be starting him on eyedrops and patient was advised to closely monitor and contact his optometrist for evaluation if no improvement in 24 to 48 hours.  I also addressed his chronic bilateral knee issues due to osteoarthritis.  Patient was given prescription for diclofenac gel.  He can possibly try glucosamine supplement over-the-counter.      Return if symptoms worsen or fail to improve, for Recheck.    Requested Prescriptions     Signed Prescriptions Disp Refills    tobramycin (Tobrex) 0.3 % solution ophthalmic solution 5 mL 0     Sig: Administer 1 drop into the left eye Every 4 (Four) Hours.    Diclofenac Sodium (VOLTAREN) 1 % gel gel 100 g 0     Sig: Apply 4 g topically to the appropriate area as directed 4 (Four) Times a Day As Needed (pain).       Steff Singletary MD  04/28/2025  15:51 EDT

## 2025-04-29 ENCOUNTER — OFFICE VISIT (OUTPATIENT)
Dept: NEUROLOGY | Facility: CLINIC | Age: 75
End: 2025-04-29
Payer: MEDICARE

## 2025-04-29 VITALS
SYSTOLIC BLOOD PRESSURE: 145 MMHG | RESPIRATION RATE: 16 BRPM | DIASTOLIC BLOOD PRESSURE: 89 MMHG | HEIGHT: 71 IN | BODY MASS INDEX: 29.12 KG/M2 | WEIGHT: 208 LBS | HEART RATE: 70 BPM

## 2025-04-29 DIAGNOSIS — G47.33 OSA (OBSTRUCTIVE SLEEP APNEA): Primary | ICD-10-CM

## 2025-04-30 DIAGNOSIS — I25.118 CORONARY ARTERY DISEASE OF NATIVE ARTERY OF NATIVE HEART WITH STABLE ANGINA PECTORIS: ICD-10-CM

## 2025-04-30 RX ORDER — ROSUVASTATIN CALCIUM 10 MG/1
10 TABLET, COATED ORAL DAILY
Qty: 90 TABLET | Refills: 1 | Status: SHIPPED | OUTPATIENT
Start: 2025-04-30

## 2025-05-25 DIAGNOSIS — M17.0 PRIMARY OSTEOARTHRITIS OF BOTH KNEES: ICD-10-CM

## 2025-05-28 DIAGNOSIS — I48.0 PAF (PAROXYSMAL ATRIAL FIBRILLATION): ICD-10-CM

## 2025-05-28 RX ORDER — APIXABAN 5 MG/1
5 TABLET, FILM COATED ORAL EVERY 12 HOURS SCHEDULED
Qty: 180 TABLET | Refills: 2 | OUTPATIENT
Start: 2025-05-28

## 2025-07-03 DIAGNOSIS — K21.9 GASTRO-ESOPHAGEAL REFLUX DISEASE WITHOUT ESOPHAGITIS: ICD-10-CM

## 2025-07-03 RX ORDER — OMEPRAZOLE 20 MG/1
20 CAPSULE, DELAYED RELEASE ORAL DAILY
Qty: 90 CAPSULE | Refills: 0 | Status: SHIPPED | OUTPATIENT
Start: 2025-07-03

## 2025-07-07 RX ORDER — ASPIRIN 81 MG/1
81 TABLET, COATED ORAL DAILY
Qty: 90 TABLET | Refills: 2 | OUTPATIENT
Start: 2025-07-07

## 2025-07-08 RX ORDER — ASPIRIN 81 MG/1
81 TABLET ORAL DAILY
Qty: 90 TABLET | Refills: 2 | Status: SHIPPED | OUTPATIENT
Start: 2025-07-08

## 2025-07-17 RX ORDER — AZITHROMYCIN 250 MG/1
TABLET, FILM COATED ORAL
Qty: 6 TABLET | Refills: 0 | Status: SHIPPED | OUTPATIENT
Start: 2025-07-17

## 2025-07-22 ENCOUNTER — LAB (OUTPATIENT)
Dept: LAB | Facility: HOSPITAL | Age: 75
End: 2025-07-22
Payer: MEDICARE

## 2025-07-22 ENCOUNTER — OFFICE VISIT (OUTPATIENT)
Dept: CARDIOLOGY | Age: 75
End: 2025-07-22
Payer: MEDICARE

## 2025-07-22 VITALS
HEART RATE: 62 BPM | WEIGHT: 212.7 LBS | HEIGHT: 71 IN | SYSTOLIC BLOOD PRESSURE: 130 MMHG | BODY MASS INDEX: 29.78 KG/M2 | DIASTOLIC BLOOD PRESSURE: 78 MMHG | OXYGEN SATURATION: 97 %

## 2025-07-22 DIAGNOSIS — I48.0 PAF (PAROXYSMAL ATRIAL FIBRILLATION): ICD-10-CM

## 2025-07-22 DIAGNOSIS — I25.10 CORONARY ARTERY DISEASE INVOLVING NATIVE CORONARY ARTERY OF NATIVE HEART WITHOUT ANGINA PECTORIS: ICD-10-CM

## 2025-07-22 DIAGNOSIS — I25.10 CORONARY ARTERY DISEASE INVOLVING NATIVE CORONARY ARTERY OF NATIVE HEART WITHOUT ANGINA PECTORIS: Primary | ICD-10-CM

## 2025-07-22 DIAGNOSIS — I10 PRIMARY HYPERTENSION: ICD-10-CM

## 2025-07-22 DIAGNOSIS — Z98.890 HISTORY OF CARDIAC RADIOFREQUENCY ABLATION: ICD-10-CM

## 2025-07-22 DIAGNOSIS — E78.2 MIXED HYPERLIPIDEMIA: ICD-10-CM

## 2025-07-22 DIAGNOSIS — R53.83 OTHER FATIGUE: ICD-10-CM

## 2025-07-22 LAB
ALBUMIN SERPL-MCNC: 4.1 G/DL (ref 3.5–5.2)
ALBUMIN/GLOB SERPL: 1.5 G/DL
ALP SERPL-CCNC: 73 U/L (ref 39–117)
ALT SERPL W P-5'-P-CCNC: 22 U/L (ref 1–41)
ANION GAP SERPL CALCULATED.3IONS-SCNC: 7.6 MMOL/L (ref 5–15)
AST SERPL-CCNC: 22 U/L (ref 1–40)
BASOPHILS # BLD AUTO: 0.06 10*3/MM3 (ref 0–0.2)
BASOPHILS NFR BLD AUTO: 1 % (ref 0–1.5)
BILIRUB SERPL-MCNC: 0.9 MG/DL (ref 0–1.2)
BUN SERPL-MCNC: 22 MG/DL (ref 8–23)
BUN/CREAT SERPL: 16.3 (ref 7–25)
CALCIUM SPEC-SCNC: 9.9 MG/DL (ref 8.6–10.5)
CHLORIDE SERPL-SCNC: 108 MMOL/L (ref 98–107)
CO2 SERPL-SCNC: 26.4 MMOL/L (ref 22–29)
CREAT SERPL-MCNC: 1.35 MG/DL (ref 0.76–1.27)
DEPRECATED RDW RBC AUTO: 43.6 FL (ref 37–54)
EGFRCR SERPLBLD CKD-EPI 2021: 55.4 ML/MIN/1.73
EOSINOPHIL # BLD AUTO: 0.28 10*3/MM3 (ref 0–0.4)
EOSINOPHIL NFR BLD AUTO: 4.5 % (ref 0.3–6.2)
ERYTHROCYTE [DISTWIDTH] IN BLOOD BY AUTOMATED COUNT: 12.6 % (ref 12.3–15.4)
GLOBULIN UR ELPH-MCNC: 2.8 GM/DL
GLUCOSE SERPL-MCNC: 94 MG/DL (ref 65–99)
HCT VFR BLD AUTO: 47.3 % (ref 37.5–51)
HGB BLD-MCNC: 15.7 G/DL (ref 13–17.7)
IMM GRANULOCYTES # BLD AUTO: 0.03 10*3/MM3 (ref 0–0.05)
IMM GRANULOCYTES NFR BLD AUTO: 0.5 % (ref 0–0.5)
LYMPHOCYTES # BLD AUTO: 1.39 10*3/MM3 (ref 0.7–3.1)
LYMPHOCYTES NFR BLD AUTO: 22.2 % (ref 19.6–45.3)
MCH RBC QN AUTO: 31.1 PG (ref 26.6–33)
MCHC RBC AUTO-ENTMCNC: 33.2 G/DL (ref 31.5–35.7)
MCV RBC AUTO: 93.7 FL (ref 79–97)
MONOCYTES # BLD AUTO: 0.58 10*3/MM3 (ref 0.1–0.9)
MONOCYTES NFR BLD AUTO: 9.3 % (ref 5–12)
NEUTROPHILS NFR BLD AUTO: 3.93 10*3/MM3 (ref 1.7–7)
NEUTROPHILS NFR BLD AUTO: 62.5 % (ref 42.7–76)
NRBC BLD AUTO-RTO: 0 /100 WBC (ref 0–0.2)
PLATELET # BLD AUTO: 224 10*3/MM3 (ref 140–450)
PMV BLD AUTO: 9.8 FL (ref 6–12)
POTASSIUM SERPL-SCNC: 4.8 MMOL/L (ref 3.5–5.2)
PROT SERPL-MCNC: 6.9 G/DL (ref 6–8.5)
RBC # BLD AUTO: 5.05 10*6/MM3 (ref 4.14–5.8)
SODIUM SERPL-SCNC: 142 MMOL/L (ref 136–145)
T-UPTAKE NFR SERPL: 1.07 TBI (ref 0.8–1.3)
T4 SERPL-MCNC: 7.28 MCG/DL (ref 4.5–11.7)
TSH SERPL DL<=0.05 MIU/L-ACNC: 0.62 UIU/ML (ref 0.27–4.2)
WBC NRBC COR # BLD AUTO: 6.27 10*3/MM3 (ref 3.4–10.8)

## 2025-07-22 PROCEDURE — 85025 COMPLETE CBC W/AUTO DIFF WBC: CPT

## 2025-07-22 PROCEDURE — 84436 ASSAY OF TOTAL THYROXINE: CPT

## 2025-07-22 PROCEDURE — 36415 COLL VENOUS BLD VENIPUNCTURE: CPT

## 2025-07-22 PROCEDURE — 84479 ASSAY OF THYROID (T3 OR T4): CPT

## 2025-07-22 PROCEDURE — 80053 COMPREHEN METABOLIC PANEL: CPT

## 2025-07-22 PROCEDURE — 84443 ASSAY THYROID STIM HORMONE: CPT

## 2025-07-22 RX ORDER — ISOSORBIDE MONONITRATE 30 MG/1
30 TABLET, EXTENDED RELEASE ORAL DAILY
Qty: 90 TABLET | Refills: 3 | Status: SHIPPED | OUTPATIENT
Start: 2025-07-22 | End: 2026-07-22

## 2025-07-22 NOTE — PROGRESS NOTES
[facility]    Subjective:     Encounter Date:07/22/25      Patient ID: Kingston Mancini is a 74 y.o. male.    Chief Complaint:   Chief Complaint   Patient presents with    Establish Care     Patient is in the office today to establish care for heart disease.       History of Present Illness    Kingston Mancini is a 74 y.o. gentleman who presents to establish care with local cardiology and previously followed with cardiology at [facility] and wants to transition his care here.  He sees Dr. Maciel here for cardiac EP and paroxysmal atrial fibrillation.    He previously followed with Dr. Bertrand.  He is being followed for chronic stable angina.  Cardiac cath in May 2024 showed normal left main, 25% LAD stenosis, and diagonal branch vessel disease revascularization was attempted, but was unable to pass a balloon and the procedure was aborted medical therapy was pursued.  He remains on Imdur 30, aspirin and Crestor.    He developed symptomatic atrial fibrillation that became persistent.  He underwent a ELENITA, and cardioversion in August 2024.  The ELENITA showed a mildly dilated left atrium but otherwise no significant valvular heart disease.    He follows with cardiac EP now.  He was most recently seen by Maldonado in the EP office.  He underwent a PVI/PFA ablation in October 2024.  He has not had recurrence since.  He remains on apixaban and aspirin    He has no angina.  He continues to golf.  He is actually traveling to [place] soon.    He has a complaint of nonspecific fatigue today.  He reports his sister just recently had a rather urgent aortic aneurysm repair.  He has not had an aneurysm on previous testing    Previous Cardiac Testing:  ELENITA, August 2024: Moderately dilated atrium.  EF 56 to 60%.  No significant valvular heart disease.    Cardiac cath May 2024:  Left main: Normal  LAD: 25% stenosis, diagonal branch vessel disease with the medial branch 90 to 95% stenosis.  PCI unsuccessful.  RCA, 20 to  "30% mid vessel stenosis.      The following portions of the patient's history were reviewed and updated as appropriate: allergies, current medications, past family history, past medical history, past social history, past surgical history and problem list.    Past Medical History:   Diagnosis Date    Abnormal ECG 04/2024    Fatigue, chest \"ache\" (vs pain), lack of energy    Allergic 1980    Penicillin    Cataract Years ago    Cataracts removed from both eyes May, 2022    Colon polyp     Colonoscopy in April 2023    Diverticulosis 2000?    Use diet to control. Antibiotics during flare ups    Erectile dysfunction     GERD (gastroesophageal reflux disease)     History of basal cell cancer 2002    Abstraction from [facility] Past Medical Hx states Basal Cell on ear    History of cardiac cath 2006    Abstraction From [facility] Heart Cath    History of degenerative disc disease     DDD    History of foreign travel     [place] July/August; [place] Oct/Nov    HL (hearing loss) Jan 1996    Permanent tinnitus from whiplash injury    Hyperlipidemia     Hypertension 05/2024    BP fluctuated but overall was not high. But since late May, it has been higher but never extreme.    AURELIA on CPAP 12/2017    AURELIA npsg Dec 2017 ahi 29 auto cpap 7-14 nasal mask    Osteoarthritis     PAF (paroxysmal atrial fibrillation) 07/02/2024    Prostate cancer 2011    [place] score 6, followed by Dr. Infante, age 59       Past Surgical History:   Procedure Laterality Date    ABLATION OF DYSRHYTHMIC FOCUS  10/08/24    CARDIAC CATHETERIZATION N/A 05/21/2024    Procedure: Left Heart Cath;  Surgeon: Cleve Bertrand MD;  Location: [facility] CATH INVASIVE LOCATION;  Service: Cardiovascular;  Laterality: N/A;    CARDIAC ELECTROPHYSIOLOGY PROCEDURE N/A 10/08/2024    Procedure: Ablation atrial fibrillation pfa;  Surgeon: Kingston Maciel MD;  Location:  [facility] CATH INVASIVE LOCATION;  Service: Cardiovascular;  Laterality: N/A;  PFA    CATARACT EXTRACTION W/ " "INTRAOCULAR LENS IMPLANT Bilateral 05/2022    COLONOSCOPY      CYST REMOVAL  01/09/2020    EYE SURGERY  May, 2022    Cataracts in both eyes    HAND SURGERY Bilateral     4/93, 10/93, 9/2007, 4/2013, 7/2013    HERNIA REPAIR  2002    OTHER SURGICAL HISTORY      Abstraction from [facility]    PROSTATECTOMY  2011    VASECTOMY             ECG 12 Lead    Date/Time: 7/22/2025 10:30 AM  Performed by: Mono Fitzpatrick MD    Authorized by: Mono Fitzpatrick MD  Comparison: compared with previous ECG from 1/3/2025  Similar to previous ECG  Rhythm: sinus rhythm  Ectopy: unifocal PVCs and infrequent PVCs  Rate: normal  Conduction: conduction normal  ST Segments: ST segments normal  T Waves: T waves normal  QRS axis: normal  Other: no other findings    Clinical impression: normal ECG             Objective:     Vitals:    07/22/25 1005   BP: 130/78   BP Location: Left arm   Patient Position: Sitting   Cuff Size: Adult   Pulse: 62   SpO2: 97%   Weight: 96.5 kg (212 lb 11.2 oz)   Height: 180.3 cm (70.98\")         Constitutional:       Appearance: Healthy appearance. Not in distress.   Neck:      Vascular: JVD normal.   Pulmonary:      Effort: Pulmonary effort is normal.      Breath sounds: Normal breath sounds.   Cardiovascular:      PMI at left midclavicular line. Normal rate. Regular rhythm. Normal S2.       Murmurs: There is no murmur.   Pulses:     Intact distal pulses.   Edema:     Peripheral edema absent.   Skin:     General: Skin is warm and dry.   Neurological:      General: No focal deficit present.      Mental Status: Alert, oriented to person, place, and time and oriented to person, place and time.   Psychiatric:         Mood and Affect: Mood and affect normal.         Lab Review:     Lipid Panel          12/13/2024    08:46   Lipid Panel   Total Cholesterol 114    Triglycerides 139    HDL Cholesterol 46    VLDL Cholesterol 24    LDL Cholesterol  44    LDL/HDL Ratio 0.87      BUN   Date Value Ref Range " Status   12/13/2024 26 (H) 8 - 23 mg/dL Final     Creatinine   Date Value Ref Range Status   12/13/2024 1.21 0.76 - 1.27 mg/dL Final     Potassium   Date Value Ref Range Status   05/08/2025 3.8 3.5 - 5.1 mmol/L Final     ALT (SGPT)   Date Value Ref Range Status   12/13/2024 27 1 - 41 U/L Final     AST (SGOT)   Date Value Ref Range Status   12/13/2024 25 1 - 40 U/L Final         Performed        Assessment:         No diagnosis found.       Plan:         Atrial fibrillation, persistent, highly symptomatic: Status post PFA/PVI ablation with Dr. Maciel October 2024.  No clinical recurrence  Reasonable to continue  Eliquis for now  No longer requiring beta-blocker, which could be considered for #2 in the future  He continues to wear his Apple Watch religiously and it has not shown any recurrence of A-fib  Chronic stable angina: Branch vessel LAD disease noted.  No high-grade large vessel disease  Continue Eliquis, he has easy bruising and has not had an MI, or cardiac event and I think the benefit he is can to gather from aspirin on top of the Eliquis is low.  Continue Eliquis for this  Lipid management per below with Crestor  Continue Imdur 30  Could consider addition of beta-blocker if worsening angina develops.  No overt angina today.  Fatigue.  I do not think this is going to be a cardiac origin.  Not really angina.  He is not on any medication at the cardiac standpoint that would cause fatigue  Check labs today including CBC CMP and TSH to ensure no other metabolic cause  Hyperlipidemia: Reviewed his labs obtained from an external source which showed a LDL of 44 in April 2025.  Goal LDL 55.  Continue current management.  Family history of thoracic aortic aneurysm.  His sister had a rather urgent surgery to repair an aneurysm.  Thankfully his testing has not shown any significant aortic enlargement to suggest aneurysmal dilation.  Will recheck an echo to assess for this as well as check for any concomitant heart  disease that would be worsening to cause fatigue.    Thank you for allowing me to participate in the care of Kingston Mancini. Feel free to contact me directly with any further questions or concerns.    RTC 1 year for reevaluation with general cardiology    Mono Fitzpatrick MD  [facility]  07/22/25  09:45 EDT       Current Outpatient Medications:     amLODIPine (NORVASC) 2.5 MG tablet, TAKE 1 TABLET BY MOUTH EVERY DAY, Disp: 90 tablet, Rfl: 1    apixaban (ELIQUIS) 5 MG tablet tablet, Take 1 tablet by mouth 2 (Two) Times a Day., Disp: 180 tablet, Rfl: 0    aspirin (Aspirin Low Dose) 81 MG EC tablet, Take 1 tablet by mouth Daily., Disp: 90 tablet, Rfl: 2    azithromycin (Zithromax Z-Earl) 250 MG tablet, Take 2 tablets by mouth on day 1, then 1 tablet daily on days 2-5, Disp: 6 tablet, Rfl: 0    calcium carbonate (OS-LAURYN) 600 MG tablet, Take 1 tablet by mouth Daily., Disp: , Rfl:     cetirizine (zyrTEC) 5 MG tablet, Take 1 tablet by mouth Daily., Disp: , Rfl:     Cholecalciferol (VITAMIN D3) 2000 units tablet, VITAMIN D3 2000 UNIT TABS, Disp: , Rfl:     Diclofenac Sodium (VOLTAREN) 1 % gel gel, APPLY 4 GRAMS TOPICALLY TO THE APPROPRIATE AREA AS DIRECTED 4 TIMES A DAY AS NEEDED (PAIN)., Disp: 100 g, Rfl: 0    isosorbide mononitrate (IMDUR) 30 MG 24 hr tablet, TAKE 1 TABLET BY MOUTH EVERY DAY, Disp: 90 tablet, Rfl: 1    Multiple Vitamins-Minerals (ICAPS AREDS 2 PO), , Disp: , Rfl:     omeprazole (priLOSEC) 20 MG capsule, TAKE 1 CAPSULE BY MOUTH EVERY DAY, Disp: 90 capsule, Rfl: 0    Probiotic Product (PROBIOTIC-10 PO), Take 1 tablet by mouth Daily., Disp: , Rfl:     rosuvastatin (CRESTOR) 10 MG tablet, Take 1 tablet by mouth Daily., Disp: 90 tablet, Rfl: 1    Zinc 50 MG tablet, Take 1 tablet by mouth., Disp: , Rfl:     tobramycin (Tobrex) 0.3 % solution ophthalmic solution, Administer 1 drop into the left eye Every 4 (Four) Hours. (Patient not taking: Reported on 7/22/2025), Disp: 5 mL, Rfl: 0         No  follow-ups on file.      Part of this note may be an electronic transcription/translation of spoken language to printed text using the Dragon Dictation System.

## 2025-08-15 ENCOUNTER — TELEPHONE (OUTPATIENT)
Dept: CARDIOLOGY | Age: 75
End: 2025-08-15
Payer: MEDICARE

## 2025-08-18 ENCOUNTER — HOSPITAL ENCOUNTER (OUTPATIENT)
Dept: CARDIOLOGY | Facility: HOSPITAL | Age: 75
Discharge: HOME | End: 2025-08-18
Admitting: STUDENT IN AN ORGANIZED HEALTH CARE EDUCATION/TRAINING PROGRAM
Payer: MEDICARE

## 2025-08-18 VITALS
DIASTOLIC BLOOD PRESSURE: 70 MMHG | SYSTOLIC BLOOD PRESSURE: 110 MMHG | BODY MASS INDEX: 29.68 KG/M2 | HEART RATE: 62 BPM | WEIGHT: 212 LBS | HEIGHT: 71 IN

## 2025-08-18 DIAGNOSIS — I48.0 PAF (PAROXYSMAL ATRIAL FIBRILLATION): ICD-10-CM

## 2025-08-18 DIAGNOSIS — I25.10 CORONARY ARTERY DISEASE INVOLVING NATIVE CORONARY ARTERY OF NATIVE HEART WITHOUT ANGINA PECTORIS: ICD-10-CM

## 2025-08-18 DIAGNOSIS — R53.83 OTHER FATIGUE: ICD-10-CM

## 2025-08-18 LAB
AORTIC ARCH: 3.1 CM
AORTIC DIMENSIONLESS INDEX: 0.76 (DI)
ASCENDING AORTA: 4.3 CM
AV MEAN PRESS GRAD SYS DOP V1V2: 4 MMHG
AV VMAX SYS DOP: 136 CM/SEC
BH CV ECHO LEFT ATRIAL STRAIN: 28 %
BH CV ECHO MEAS - ACS: 2.47 CM
BH CV ECHO MEAS - AO MAX PG: 7.4 MMHG
BH CV ECHO MEAS - AO ROOT DIAM: 3.7 CM
BH CV ECHO MEAS - AO V2 VTI: 28.6 CM
BH CV ECHO MEAS - AVA(I,D): 2.7 CM2
BH CV ECHO MEAS - EDV(CUBED): 74.1 ML
BH CV ECHO MEAS - EDV(MOD-SP2): 72 ML
BH CV ECHO MEAS - EDV(MOD-SP4): 74 ML
BH CV ECHO MEAS - EF(MOD-SP2): 58.3 %
BH CV ECHO MEAS - EF(MOD-SP4): 56.8 %
BH CV ECHO MEAS - ESV(CUBED): 21 ML
BH CV ECHO MEAS - ESV(MOD-SP2): 30 ML
BH CV ECHO MEAS - ESV(MOD-SP4): 32 ML
BH CV ECHO MEAS - FS: 34.3 %
BH CV ECHO MEAS - IVS/LVPW: 1.1 CM
BH CV ECHO MEAS - IVSD: 1.1 CM
BH CV ECHO MEAS - LAT PEAK E' VEL: 6.1 CM/SEC
BH CV ECHO MEAS - LV DIASTOLIC VOL/BSA (35-75): 34.5 CM2
BH CV ECHO MEAS - LV MASS(C)D: 147 GRAMS
BH CV ECHO MEAS - LV MAX PG: 4.5 MMHG
BH CV ECHO MEAS - LV MEAN PG: 2 MMHG
BH CV ECHO MEAS - LV SYSTOLIC VOL/BSA (12-30): 14.9 CM2
BH CV ECHO MEAS - LV V1 MAX: 106 CM/SEC
BH CV ECHO MEAS - LV V1 VTI: 21.6 CM
BH CV ECHO MEAS - LVIDD: 4.2 CM
BH CV ECHO MEAS - LVIDS: 2.8 CM
BH CV ECHO MEAS - LVOT AREA: 3.6 CM2
BH CV ECHO MEAS - LVOT DIAM: 2.14 CM
BH CV ECHO MEAS - LVPWD: 1 CM
BH CV ECHO MEAS - MED PEAK E' VEL: 7.1 CM/SEC
BH CV ECHO MEAS - MR MAX PG: 94.7 MMHG
BH CV ECHO MEAS - MR MAX VEL: 486.5 CM/SEC
BH CV ECHO MEAS - MV A DUR: 0.11 SEC
BH CV ECHO MEAS - MV A MAX VEL: 72 CM/SEC
BH CV ECHO MEAS - MV DEC SLOPE: 269.5 CM/SEC2
BH CV ECHO MEAS - MV DEC TIME: 0.26 SEC
BH CV ECHO MEAS - MV E MAX VEL: 82.3 CM/SEC
BH CV ECHO MEAS - MV E/A: 1.14
BH CV ECHO MEAS - MV MAX PG: 3.2 MMHG
BH CV ECHO MEAS - MV MEAN PG: 1.03 MMHG
BH CV ECHO MEAS - MV P1/2T: 98.1 MSEC
BH CV ECHO MEAS - MV V2 VTI: 28.1 CM
BH CV ECHO MEAS - MVA(P1/2T): 2.24 CM2
BH CV ECHO MEAS - MVA(VTI): 2.8 CM2
BH CV ECHO MEAS - PA ACC TIME: 0.12 SEC
BH CV ECHO MEAS - PA V2 MAX: 89.6 CM/SEC
BH CV ECHO MEAS - PI END-D VEL: 99.1 CM/SEC
BH CV ECHO MEAS - PULM A REVS DUR: 0.11 SEC
BH CV ECHO MEAS - PULM A REVS VEL: 24.7 CM/SEC
BH CV ECHO MEAS - PULM DIAS VEL: 37.1 CM/SEC
BH CV ECHO MEAS - PULM S/D: 1.68
BH CV ECHO MEAS - PULM SYS VEL: 62.3 CM/SEC
BH CV ECHO MEAS - QP/QS: 0.79
BH CV ECHO MEAS - RV MAX PG: 1.01 MMHG
BH CV ECHO MEAS - RV V1 MAX: 50.1 CM/SEC
BH CV ECHO MEAS - RV V1 VTI: 9.7 CM
BH CV ECHO MEAS - RVOT DIAM: 2.8 CM
BH CV ECHO MEAS - SUP REN AO DIAM: 2.1 CM
BH CV ECHO MEAS - SV(LVOT): 77.9 ML
BH CV ECHO MEAS - SV(MOD-SP2): 42 ML
BH CV ECHO MEAS - SV(MOD-SP4): 42 ML
BH CV ECHO MEAS - SV(RVOT): 61.2 ML
BH CV ECHO MEAS - SVI(LVOT): 36.3 ML/M2
BH CV ECHO MEAS - SVI(MOD-SP2): 19.6 ML/M2
BH CV ECHO MEAS - SVI(MOD-SP4): 19.6 ML/M2
BH CV ECHO MEASUREMENTS AVERAGE E/E' RATIO: 12.47
BH CV XLRA - RV BASE: 3.3 CM
BH CV XLRA - RV LENGTH: 7.8 CM
BH CV XLRA - RV MID: 2.4 CM
LEFT ATRIUM VOLUME INDEX: 28.9 ML/M2
LV EF BIPLANE MOD: 59 %
SINUS: 3.9 CM
STJ: 3.2 CM

## 2025-08-18 PROCEDURE — 93306 TTE W/DOPPLER COMPLETE: CPT

## 2025-08-18 PROCEDURE — 93306 TTE W/DOPPLER COMPLETE: CPT | Performed by: INTERNAL MEDICINE

## 2025-08-18 PROCEDURE — 93356 MYOCRD STRAIN IMG SPCKL TRCK: CPT

## 2025-08-18 PROCEDURE — 93356 MYOCRD STRAIN IMG SPCKL TRCK: CPT | Performed by: INTERNAL MEDICINE

## 2025-08-26 ENCOUNTER — TELEPHONE (OUTPATIENT)
Dept: NEUROLOGY | Facility: CLINIC | Age: 75
End: 2025-08-26
Payer: MEDICARE

## 2025-08-28 ENCOUNTER — OFFICE VISIT (OUTPATIENT)
Dept: NEUROLOGY | Facility: CLINIC | Age: 75
End: 2025-08-28
Payer: MEDICARE

## 2025-08-28 VITALS
BODY MASS INDEX: 28.84 KG/M2 | SYSTOLIC BLOOD PRESSURE: 119 MMHG | HEIGHT: 71 IN | HEART RATE: 65 BPM | WEIGHT: 206 LBS | DIASTOLIC BLOOD PRESSURE: 75 MMHG

## 2025-08-28 DIAGNOSIS — R23.8 SKIN IRRITATION: ICD-10-CM

## 2025-08-28 DIAGNOSIS — G47.33 OSA (OBSTRUCTIVE SLEEP APNEA): Primary | ICD-10-CM

## 2025-08-28 RX ORDER — CLOPIDOGREL BISULFATE 75 MG/1
75 TABLET ORAL
COMMUNITY

## 2025-08-28 RX ORDER — PANTOPRAZOLE SODIUM 20 MG/1
20 TABLET, DELAYED RELEASE ORAL
COMMUNITY

## 2025-08-28 RX ORDER — AMIODARONE HYDROCHLORIDE 200 MG/1
200 TABLET ORAL
COMMUNITY

## 2025-08-28 RX ORDER — ISOSORBIDE DINITRATE 5 MG/1
TABLET ORAL
COMMUNITY

## 2025-08-28 RX ORDER — IPRATROPIUM BROMIDE 42 UG/1
SPRAY, METERED NASAL
COMMUNITY
Start: 2025-08-27

## 2025-08-28 RX ORDER — DIFLUPREDNATE OPHTHALMIC 0.5 MG/ML
EMULSION OPHTHALMIC
COMMUNITY
Start: 2025-04-29

## (undated) DEVICE — CATH GUIDE LAUNCHER EBU4.0 6F 100CM

## (undated) DEVICE — MEDICINE CUP, GRADUATED, STER: Brand: MEDLINE

## (undated) DEVICE — PINNACLE INTRODUCER SHEATH: Brand: PINNACLE

## (undated) DEVICE — 6F .070 XB LAD 3.5 100CM: Brand: VISTA BRITE TIP

## (undated) DEVICE — DEV INFL COMPAK W/ACCESSPLUS IN4530

## (undated) DEVICE — CATH DIAG IMPULSE PIG .056 6F 110CM

## (undated) DEVICE — Device: Brand: DECANAV

## (undated) DEVICE — CABL CATH/INTERFACE ABLAT PULSESELECT/PFA 99IN 1P/U STRL

## (undated) DEVICE — NDL PERC 1PRT THNWALL W/BASEPLT 18G 7CM

## (undated) DEVICE — Device: Brand: REFERENCE PATCH CARTO 3

## (undated) DEVICE — STPCK 3WY HP ROT

## (undated) DEVICE — LOU EP: Brand: MEDLINE INDUSTRIES, INC.

## (undated) DEVICE — ELECTRD DEFIB M/FUNC PROPADZ RADIOL 2PK

## (undated) DEVICE — MINI TREK CORONARY DILATATION CATHETER 1.20 MM X 12 MM / RAPID-EXCHANGE: Brand: MINI TREK

## (undated) DEVICE — OCTA,PERSEID,2-2-2-2-2,F-CURVE: Brand: OCTARAY MAPPING CATHETER

## (undated) DEVICE — SHEATH STEER ABLAT PULSESELECT/PFA FLEXCATH/CONTOUR 10F 13MM

## (undated) DEVICE — SYS TRNSEP ACC ACROSS ADLT BRK1 71CM

## (undated) DEVICE — CATH ABLAT PULSESELECT/PFA OTW 9/ELECTRD 145CM

## (undated) DEVICE — CATH DIAG IMPULSE FR4 6F 100CM

## (undated) DEVICE — CLEARSIGHT FINGER CUFF SMALL MULTI PACK: Brand: CLEARSIGHT

## (undated) DEVICE — MINI TREK CORONARY DILATATION CATHETER 2.0 MM X 12 MM / RAPID-EXCHANGE: Brand: MINI TREK

## (undated) DEVICE — SOUNDSTAR ECO 8F G CATHETER: Brand: SOUNDSTAR

## (undated) DEVICE — DGW .035 FC J3MM 150CM TEF HEP: Brand: EMERALD

## (undated) DEVICE — CATH DIAG IMPULSE FL4 6F 100CM

## (undated) DEVICE — GW AMPLATZ  XSTIFF CVD .032 3MM 180CM

## (undated) DEVICE — PK TRY HEART CATH 50

## (undated) DEVICE — HI-TORQUE WHISPER MS GUIDE WIRE .014 STRAIGHT TIP 3.0 CM X 190 CM: Brand: HI-TORQUE WHISPER

## (undated) DEVICE — PREF.GUIDING SHEATH W/MULT.CRV: Brand: PREFACE

## (undated) DEVICE — CONTRST ISOVUE300 61PCT 50ML

## (undated) DEVICE — TBG NAMIC PRESS MONTR A/ F/M 12IN